# Patient Record
Sex: MALE | Race: WHITE | NOT HISPANIC OR LATINO | Employment: UNEMPLOYED | URBAN - METROPOLITAN AREA
[De-identification: names, ages, dates, MRNs, and addresses within clinical notes are randomized per-mention and may not be internally consistent; named-entity substitution may affect disease eponyms.]

---

## 2019-09-03 ENCOUNTER — OFFICE VISIT CONVERTED (OUTPATIENT)
Dept: CARDIOLOGY | Facility: CLINIC | Age: 49
End: 2019-09-03
Attending: SPECIALIST

## 2019-10-29 ENCOUNTER — OFFICE VISIT CONVERTED (OUTPATIENT)
Dept: CARDIOLOGY | Facility: CLINIC | Age: 49
End: 2019-10-29
Attending: SPECIALIST

## 2019-11-21 ENCOUNTER — HOSPITAL ENCOUNTER (OUTPATIENT)
Dept: LAB | Facility: HOSPITAL | Age: 49
Discharge: HOME | End: 2019-11-21
Attending: INTERNAL MEDICINE

## 2019-11-21 LAB
ALBUMIN SERPL-MCNC: 4.2 G/DL (ref 3.5–5)
ANION GAP SERPL CALC-SCNC: 22 MMOL/L (ref 8–19)
APPEARANCE UR: CLEAR
BASOPHILS # BLD AUTO: 0.11 10*3/UL (ref 0–0.2)
BASOPHILS NFR BLD AUTO: 1.2 % (ref 0–3)
BILIRUB UR QL: NEGATIVE
BUN SERPL-MCNC: 51 MG/DL (ref 5–25)
BUN/CREAT SERPL: 19 {RATIO} (ref 6–20)
CALCIUM SERPL-MCNC: 9.5 MG/DL (ref 8.7–10.4)
CHLORIDE SERPL-SCNC: 99 MMOL/L (ref 99–111)
COLOR UR: YELLOW
CONV ABS IMM GRAN: 0.04 10*3/UL (ref 0–0.2)
CONV BACTERIA: NEGATIVE
CONV CO2: 18 MMOL/L (ref 22–32)
CONV COLLECTION SOURCE (UA): ABNORMAL
CONV CREATININE URINE, RANDOM: 67.6 MG/DL (ref 10–300)
CONV HYALINE CASTS IN URINE MICRO: ABNORMAL /[LPF]
CONV IMMATURE GRAN: 0.4 % (ref 0–1.8)
CONV UROBILINOGEN IN URINE BY AUTOMATED TEST STRIP: 0.2 {EHRLICHU}/DL (ref 0.1–1)
CREAT UR-MCNC: 2.74 MG/DL (ref 0.7–1.2)
DEPRECATED RDW RBC AUTO: 45 FL (ref 35.1–43.9)
EOSINOPHIL # BLD AUTO: 0.2 10*3/UL (ref 0–0.7)
EOSINOPHIL # BLD AUTO: 2.2 % (ref 0–7)
ERYTHROCYTE [DISTWIDTH] IN BLOOD BY AUTOMATED COUNT: 13.2 % (ref 11.6–14.4)
GFR SERPLBLD BASED ON 1.73 SQ M-ARVRAT: 26 ML/MIN/{1.73_M2}
GLUCOSE SERPL-MCNC: 195 MG/DL (ref 70–99)
GLUCOSE UR QL: 250 MG/DL
HCT VFR BLD AUTO: 31.4 % (ref 42–52)
HGB BLD-MCNC: 10.1 G/DL (ref 14–18)
HGB UR QL STRIP: NEGATIVE
KETONES UR QL STRIP: NEGATIVE MG/DL
LEUKOCYTE ESTERASE UR QL STRIP: NEGATIVE
LYMPHOCYTES # BLD AUTO: 1.23 10*3/UL (ref 1–5)
LYMPHOCYTES NFR BLD AUTO: 13.6 % (ref 20–45)
MCH RBC QN AUTO: 29.6 PG (ref 27–31)
MCHC RBC AUTO-ENTMCNC: 32.2 G/DL (ref 33–37)
MCV RBC AUTO: 92.1 FL (ref 80–96)
MONOCYTES # BLD AUTO: 0.81 10*3/UL (ref 0.2–1.2)
MONOCYTES NFR BLD AUTO: 8.9 % (ref 3–10)
NEUTROPHILS # BLD AUTO: 6.68 10*3/UL (ref 2–8)
NEUTROPHILS NFR BLD AUTO: 73.7 % (ref 30–85)
NITRITE UR QL STRIP: NEGATIVE
NRBC CBCN: 0 % (ref 0–0.7)
PH UR STRIP.AUTO: 5 [PH] (ref 5–8)
PHOSPHATE SERPL-MCNC: 4.1 MG/DL (ref 2.4–4.5)
PLATELET # BLD AUTO: 357 10*3/UL (ref 130–400)
PMV BLD AUTO: 10.5 FL (ref 9.4–12.4)
POTASSIUM SERPL-SCNC: 5 MMOL/L (ref 3.5–5.3)
PROT UR QL: 300 MG/DL
PROT UR-MCNC: 136 MG/DL
RBC # BLD AUTO: 3.41 10*6/UL (ref 4.7–6.1)
RBC #/AREA URNS HPF: ABNORMAL /[HPF]
SODIUM SERPL-SCNC: 134 MMOL/L (ref 135–147)
SP GR UR: 1.01 (ref 1–1.03)
WBC # BLD AUTO: 9.07 10*3/UL (ref 4.8–10.8)
WBC #/AREA URNS HPF: ABNORMAL /[HPF]

## 2020-03-02 ENCOUNTER — OFFICE VISIT CONVERTED (OUTPATIENT)
Dept: CARDIOLOGY | Facility: CLINIC | Age: 50
End: 2020-03-02
Attending: SPECIALIST

## 2020-04-27 ENCOUNTER — HOSPITAL ENCOUNTER (OUTPATIENT)
Dept: LAB | Facility: HOSPITAL | Age: 50
Discharge: HOME | End: 2020-04-27
Attending: INTERNAL MEDICINE

## 2020-04-27 LAB
ANION GAP SERPL CALC-SCNC: 23 MMOL/L (ref 8–19)
BUN SERPL-MCNC: 65 MG/DL (ref 5–25)
BUN/CREAT SERPL: 16 {RATIO} (ref 6–20)
CALCIUM SERPL-MCNC: 9.5 MG/DL (ref 8.7–10.4)
CHLORIDE SERPL-SCNC: 99 MMOL/L (ref 99–111)
CONV CO2: 19 MMOL/L (ref 22–32)
CREAT UR-MCNC: 4.14 MG/DL (ref 0.7–1.2)
GFR SERPLBLD BASED ON 1.73 SQ M-ARVRAT: 16 ML/MIN/{1.73_M2}
GLUCOSE SERPL-MCNC: 146 MG/DL (ref 70–99)
OSMOLALITY SERPL CALC.SUM OF ELEC: 303 MOSM/KG (ref 273–304)
POTASSIUM SERPL-SCNC: 5.3 MMOL/L (ref 3.5–5.3)
SODIUM SERPL-SCNC: 136 MMOL/L (ref 135–147)

## 2020-06-15 ENCOUNTER — HOSPITAL ENCOUNTER (OUTPATIENT)
Dept: LAB | Facility: HOSPITAL | Age: 50
Discharge: HOME | End: 2020-06-15
Attending: INTERNAL MEDICINE

## 2020-06-15 LAB
25(OH)D3 SERPL-MCNC: 36.3 NG/ML (ref 30–100)
ALBUMIN SERPL-MCNC: 4.5 G/DL (ref 3.5–5)
ANION GAP SERPL CALC-SCNC: 21 MMOL/L (ref 8–19)
APPEARANCE UR: CLEAR
BASOPHILS # BLD AUTO: 0.08 10*3/UL (ref 0–0.2)
BASOPHILS NFR BLD AUTO: 0.9 % (ref 0–3)
BILIRUB UR QL: NEGATIVE
BUN SERPL-MCNC: 68 MG/DL (ref 5–25)
BUN/CREAT SERPL: 18 {RATIO} (ref 6–20)
CALCIUM SERPL-MCNC: 10.1 MG/DL (ref 8.7–10.4)
CHLORIDE SERPL-SCNC: 101 MMOL/L (ref 99–111)
COLOR UR: YELLOW
CONV ABS IMM GRAN: 0.03 10*3/UL (ref 0–0.2)
CONV BACTERIA: NEGATIVE
CONV CO2: 18 MMOL/L (ref 22–32)
CONV COLLECTION SOURCE (UA): ABNORMAL
CONV CREATININE URINE, RANDOM: 79.3 MG/DL (ref 10–300)
CONV HYALINE CASTS IN URINE MICRO: ABNORMAL /[LPF]
CONV IMMATURE GRAN: 0.3 % (ref 0–1.8)
CONV PROTEIN TO CREATININE RATIO (RANDOM URINE): 0.67 {RATIO} (ref 0–0.1)
CONV UROBILINOGEN IN URINE BY AUTOMATED TEST STRIP: 0.2 {EHRLICHU}/DL (ref 0.1–1)
CREAT UR-MCNC: 3.8 MG/DL (ref 0.7–1.2)
DEPRECATED RDW RBC AUTO: 43.5 FL (ref 35.1–43.9)
EOSINOPHIL # BLD AUTO: 0.17 10*3/UL (ref 0–0.7)
EOSINOPHIL # BLD AUTO: 1.9 % (ref 0–7)
ERYTHROCYTE [DISTWIDTH] IN BLOOD BY AUTOMATED COUNT: 12.5 % (ref 11.6–14.4)
FERRITIN SERPL-MCNC: 209 NG/ML (ref 30–300)
GFR SERPLBLD BASED ON 1.73 SQ M-ARVRAT: 17 ML/MIN/{1.73_M2}
GLUCOSE SERPL-MCNC: 153 MG/DL (ref 70–99)
GLUCOSE UR QL: NEGATIVE MG/DL
HCT VFR BLD AUTO: 30 % (ref 42–52)
HGB BLD-MCNC: 9.2 G/DL (ref 14–18)
HGB UR QL STRIP: NEGATIVE
IRON SATN MFR SERPL: 34 % (ref 20–55)
IRON SERPL-MCNC: 100 UG/DL (ref 70–180)
KETONES UR QL STRIP: NEGATIVE MG/DL
LEUKOCYTE ESTERASE UR QL STRIP: NEGATIVE
LYMPHOCYTES # BLD AUTO: 2.1 10*3/UL (ref 1–5)
LYMPHOCYTES NFR BLD AUTO: 24 % (ref 20–45)
MCH RBC QN AUTO: 29.2 PG (ref 27–31)
MCHC RBC AUTO-ENTMCNC: 30.7 G/DL (ref 33–37)
MCV RBC AUTO: 95.2 FL (ref 80–96)
MONOCYTES # BLD AUTO: 0.8 10*3/UL (ref 0.2–1.2)
MONOCYTES NFR BLD AUTO: 9.1 % (ref 3–10)
NEUTROPHILS # BLD AUTO: 5.57 10*3/UL (ref 2–8)
NEUTROPHILS NFR BLD AUTO: 63.8 % (ref 30–85)
NITRITE UR QL STRIP: NEGATIVE
NRBC CBCN: 0 % (ref 0–0.7)
PH UR STRIP.AUTO: 5 [PH] (ref 5–8)
PHOSPHATE SERPL-MCNC: 4.6 MG/DL (ref 2.4–4.5)
PLATELET # BLD AUTO: 368 10*3/UL (ref 130–400)
PMV BLD AUTO: 10.6 FL (ref 9.4–12.4)
POTASSIUM SERPL-SCNC: 5.5 MMOL/L (ref 3.5–5.3)
PROT UR QL: 100 MG/DL
PROT UR-MCNC: 53.4 MG/DL
PTH-INTACT SERPL-MCNC: 67.1 PG/ML (ref 11.1–79.5)
RBC # BLD AUTO: 3.15 10*6/UL (ref 4.7–6.1)
RBC #/AREA URNS HPF: ABNORMAL /[HPF]
SODIUM SERPL-SCNC: 134 MMOL/L (ref 135–147)
SP GR UR: 1.01 (ref 1–1.03)
SPERM URNS QL MICRO: PRESENT
TIBC SERPL-MCNC: 290 UG/DL (ref 245–450)
TRANSFERRIN SERPL-MCNC: 203 MG/DL (ref 215–365)
WBC # BLD AUTO: 8.75 10*3/UL (ref 4.8–10.8)
WBC #/AREA URNS HPF: ABNORMAL /[HPF]

## 2020-08-20 ENCOUNTER — HOSPITAL ENCOUNTER (OUTPATIENT)
Dept: INFUSION THERAPY | Facility: HOSPITAL | Age: 50
Setting detail: RECURRING SERIES
Discharge: HOME | End: 2020-11-18
Attending: INTERNAL MEDICINE

## 2020-08-20 LAB
HCT VFR BLD AUTO: 27 % (ref 42–52)
HGB BLD-MCNC: 8.6 G/DL (ref 14–18)

## 2020-09-17 ENCOUNTER — HOSPITAL ENCOUNTER (OUTPATIENT)
Dept: LAB | Facility: HOSPITAL | Age: 50
Discharge: HOME | End: 2020-09-17
Attending: NURSE PRACTITIONER

## 2020-09-17 LAB
ALBUMIN SERPL-MCNC: 4.1 G/DL (ref 3.5–5)
ANION GAP SERPL CALC-SCNC: 21 MMOL/L (ref 8–19)
BUN SERPL-MCNC: 50 MG/DL (ref 5–25)
BUN/CREAT SERPL: 12 {RATIO} (ref 6–20)
CALCIUM SERPL-MCNC: 9.5 MG/DL (ref 8.7–10.4)
CHLORIDE SERPL-SCNC: 93 MMOL/L (ref 99–111)
CONV CO2: 23 MMOL/L (ref 22–32)
CREAT UR-MCNC: 4.03 MG/DL (ref 0.7–1.2)
GFR SERPLBLD BASED ON 1.73 SQ M-ARVRAT: 16 ML/MIN/{1.73_M2}
GLUCOSE SERPL-MCNC: 165 MG/DL (ref 70–99)
PHOSPHATE SERPL-MCNC: 3.6 MG/DL (ref 2.4–4.5)
POTASSIUM SERPL-SCNC: 4.3 MMOL/L (ref 3.5–5.3)
SODIUM SERPL-SCNC: 133 MMOL/L (ref 135–147)

## 2021-03-26 ENCOUNTER — CONVERSION ENCOUNTER (OUTPATIENT)
Dept: INTERNAL MEDICINE | Facility: CLINIC | Age: 51
End: 2021-03-26

## 2021-03-26 ENCOUNTER — HOSPITAL ENCOUNTER (OUTPATIENT)
Dept: INTERNAL MEDICINE | Facility: CLINIC | Age: 51
Discharge: HOME | End: 2021-03-26
Attending: STUDENT IN AN ORGANIZED HEALTH CARE EDUCATION/TRAINING PROGRAM

## 2021-03-26 ENCOUNTER — OFFICE VISIT CONVERTED (OUTPATIENT)
Dept: INTERNAL MEDICINE | Facility: CLINIC | Age: 51
End: 2021-03-26
Attending: STUDENT IN AN ORGANIZED HEALTH CARE EDUCATION/TRAINING PROGRAM

## 2021-03-26 LAB
ALBUMIN SERPL-MCNC: 3.8 G/DL (ref 3.5–5)
ALBUMIN/GLOB SERPL: 1.1 {RATIO} (ref 1.4–2.6)
ALP SERPL-CCNC: 96 U/L (ref 53–128)
ALT SERPL-CCNC: 9 U/L (ref 10–40)
ANION GAP SERPL CALC-SCNC: 18 MMOL/L (ref 8–19)
AST SERPL-CCNC: 11 U/L (ref 15–50)
BASOPHILS # BLD AUTO: 0.09 10*3/UL (ref 0–0.2)
BASOPHILS NFR BLD AUTO: 0.9 % (ref 0–3)
BILIRUB SERPL-MCNC: <0.15 MG/DL (ref 0.2–1.3)
BUN SERPL-MCNC: 48 MG/DL (ref 5–25)
BUN/CREAT SERPL: 18 {RATIO} (ref 6–20)
CALCIUM SERPL-MCNC: 9 MG/DL (ref 8.7–10.4)
CHLORIDE SERPL-SCNC: 108 MMOL/L (ref 99–111)
CHOLEST SERPL-MCNC: 148 MG/DL (ref 107–200)
CHOLEST/HDLC SERPL: 4.5 {RATIO} (ref 3–6)
CONV ABS IMM GRAN: 0.03 10*3/UL (ref 0–0.2)
CONV CO2: 16 MMOL/L (ref 22–32)
CONV IMMATURE GRAN: 0.3 % (ref 0–1.8)
CONV TOTAL PROTEIN: 7.4 G/DL (ref 6.3–8.2)
CREAT UR-MCNC: 2.62 MG/DL (ref 0.7–1.2)
DEPRECATED RDW RBC AUTO: 40.9 FL (ref 35.1–43.9)
EOSINOPHIL # BLD AUTO: 0.29 10*3/UL (ref 0–0.7)
EOSINOPHIL # BLD AUTO: 3 % (ref 0–7)
ERYTHROCYTE [DISTWIDTH] IN BLOOD BY AUTOMATED COUNT: 12.3 % (ref 11.6–14.4)
EST. AVERAGE GLUCOSE BLD GHB EST-MCNC: 103 MG/DL
GFR SERPLBLD BASED ON 1.73 SQ M-ARVRAT: 27 ML/MIN/{1.73_M2}
GLOBULIN UR ELPH-MCNC: 3.6 G/DL (ref 2–3.5)
GLUCOSE SERPL-MCNC: 110 MG/DL (ref 70–99)
HBA1C MFR BLD: 5.2 % (ref 3.5–5.7)
HCT VFR BLD AUTO: 27.2 % (ref 42–52)
HDLC SERPL-MCNC: 33 MG/DL (ref 40–60)
HGB BLD-MCNC: 8.6 G/DL (ref 14–18)
LDLC SERPL CALC-MCNC: 91 MG/DL (ref 70–100)
LYMPHOCYTES # BLD AUTO: 1.83 10*3/UL (ref 1–5)
LYMPHOCYTES NFR BLD AUTO: 18.6 % (ref 20–45)
MCH RBC QN AUTO: 28.9 PG (ref 27–31)
MCHC RBC AUTO-ENTMCNC: 31.6 G/DL (ref 33–37)
MCV RBC AUTO: 91.3 FL (ref 80–96)
MONOCYTES # BLD AUTO: 0.92 10*3/UL (ref 0.2–1.2)
MONOCYTES NFR BLD AUTO: 9.4 % (ref 3–10)
NEUTROPHILS # BLD AUTO: 6.66 10*3/UL (ref 2–8)
NEUTROPHILS NFR BLD AUTO: 67.8 % (ref 30–85)
NRBC CBCN: 0 % (ref 0–0.7)
OSMOLALITY SERPL CALC.SUM OF ELEC: 295 MOSM/KG (ref 273–304)
PLATELET # BLD AUTO: 449 10*3/UL (ref 130–400)
PMV BLD AUTO: 9.8 FL (ref 9.4–12.4)
POTASSIUM SERPL-SCNC: 5.6 MMOL/L (ref 3.5–5.3)
RBC # BLD AUTO: 2.98 10*6/UL (ref 4.7–6.1)
SODIUM SERPL-SCNC: 136 MMOL/L (ref 135–147)
TRIGL SERPL-MCNC: 122 MG/DL (ref 40–150)
TSH SERPL-ACNC: 2.34 M[IU]/L (ref 0.27–4.2)
VLDLC SERPL-MCNC: 24 MG/DL (ref 5–37)
WBC # BLD AUTO: 9.82 10*3/UL (ref 4.8–10.8)

## 2021-04-15 ENCOUNTER — OFFICE VISIT CONVERTED (OUTPATIENT)
Dept: INTERNAL MEDICINE | Facility: CLINIC | Age: 51
End: 2021-04-15
Attending: STUDENT IN AN ORGANIZED HEALTH CARE EDUCATION/TRAINING PROGRAM

## 2021-04-30 ENCOUNTER — PATIENT OUTREACH - CONVERTED (OUTPATIENT)
Dept: INTERNAL MEDICINE | Facility: CLINIC | Age: 51
End: 2021-04-30
Attending: STUDENT IN AN ORGANIZED HEALTH CARE EDUCATION/TRAINING PROGRAM

## 2021-05-05 ENCOUNTER — HOSPITAL ENCOUNTER (OUTPATIENT)
Dept: NUCLEAR MEDICINE | Facility: HOSPITAL | Age: 51
Discharge: HOME | End: 2021-05-05
Attending: SPECIALIST

## 2021-05-10 NOTE — H&P
History and Physical      Patient Name: Rogelio Dee   Patient ID: [id number]   Sex: Male   YOB: 1970    Primary Care Provider: Simeon Calhoun MD   Referring Provider: Adelita Islas NP    Visit Date: March 26, 2021    Provider: Simeon Calhoun MD   Location: [facility]   Location Address: [address] [place] [address]; [address]  [place], [place]  [address]   Location Phone: [phone]          Chief Complaint  · new patient, establish care  · medication refills      History Of Present Illness  Rogelio Dee is a 51 year old /White male who presents for evaluation and treatment of:      here to establish care.  previous pcp - Adelita Islas    Complex past medical history notable for CKD 4 secondary diabetic nephropathy, metabolic acidosis 2/2 CKD, T2DM with neuropathy, diabetic retinopathy with left eye blindness, great toe amputations bilaterally, nicotine abuse, diastolic CHF/HFpEF and low back pain.    He reports he has fired his previous PCP, Adelita Islas as she has disclosed private medical information regarding him to the patient's mother.    He says that he has refused dialysis treatment, but then goes on to say he might consider peritoneal dialysis.    He follows with pain medicine for his chronic pain ([facility]).    His nephrologist is Dr. Marietta Hutson.  His cardiologist is Christopher.  He was somewhat evasive when asked why he hasn't followed up with either person in the last several months.    He follows with podiatry, who per his report has him on Cipro for a diabetic left foot ulcer that was recently worked on.    He follows with ophtho at [facility], where he reportedly receives intravitreal injections.    He is a 1PPD smoker, and also vapes nicotine-containing fluids.  He denies other illicits, MJ or ETOH.  He does report that he often takes ibuprofen and Tylenol for additional pain management on top of what he is  prescribed.  He denies BC powder.    In 2020, had evaluation for chest pain including stress test showing normal EF, and no evidence of ischemia.    He reports that he has run out of many of his meds, and is using old prescriptions to get by.  Of note, he says that he will sometimes take metoprolol on top of his carvedilol when he experiences chest pain.    He has a history of hyperkalemia for which ace inhibitor and spironolactone were previously discontinued.    Food insecurity/Housing Instability:    Reports reliant on food genao for food.  His house is being foreclosed.  He has been attempting to obtain disability, but thus far has been unsuccessful.    Health Maintenance:  Immunizations: refuses influenza, COVID and other immunizations  Colon cancer screening: not interested at this time           Past Medical History  Disease Name Date Onset Notes   Chronic pain syndrome --  --    Diabetes Mellitus, Type II --  --    Hyperlipidemia --  --    Hypertension, Benign Essential --  --    Lumbosacral disc herniation, L5-S1 --  --          Past Surgical History  Procedure Name Date Notes   Discectomy --  --    Toe amputation --  --          Medication List  Name Date Started Instructions   amlodipine 10 mg oral tablet 05/26/2020 TAKE 1 TABLET BY MOUTH EVERY DAY   aspirin 81 mg oral tablet,delayed release (DR/EC)  take 1 tablet (81 mg) by oral route once daily   atorvastatin 20 mg oral tablet  take 1 tablet (20 mg) by oral route once daily at bedtime   carvedilol 25 mg oral tablet  take 1 tablet (25 mg) by oral route 2 times per day with food   cholecalciferol (vitamin D3) 50 mcg (2,000 unit) oral capsule  take 1 capsule by oral route daily   citalopram 40 mg oral tablet  --    clonidine HCl 0.1 mg oral tablet  take 1 tablet (0.1 mg) by oral route 2 times per day   furosemide 40 mg oral tablet  take 1 tablet (40 mg) by oral route once daily   HYDRALAZINE 100MG (HUNDRED MG) TABS 11/23/2020 TAKE 1 TABLET BY MOUTH THREE  TIMES DAILY   hydrocodone-acetaminophen 7.5-325 mg oral tablet  take 1 tablet by oral route every 6 hours as needed for pain   Januvia 50 mg oral tablet  take 2 tablets (100 mg) by oral route once daily   lisinopril 20 mg oral tablet  take 1 tablet (20 mg) by oral route once daily   lisinopril-hydrochlorothiazide 20-12.5 mg oral tablet  take 1 tablet by oral route once daily   Lokelma 5 gram oral powder in packet  take 1 packet (5 gram) mix in 45 mL water and drink immediately by oral route every other day rinse glass with water and drink for full dose   ropinirole 0.5 mg oral tablet  take 1 tablet (0.5 mg) by oral route 1-3 hours before bedtime   sodium bicarbonate 650 mg oral tablet  take 2 tablets by oral route 2 times a day   spironolactone 25 mg oral tablet  take 1 tablet (25 mg) by oral route 2 times per day   tamsulosin 0.4 mg oral capsule  take 1 capsule (0.4 mg) by oral route once daily 1/2 hour following the same meal each day   Tresiba FlexTouch U-100 100 unit/mL (3 mL) subcutaneous insulin pen  inject by subcutaneous route as per insulin protocol         Allergy List  Allergen Name Date Reaction Notes   NO KNOWN DRUG ALLERGIES --  --  --          Family Medical History  Disease Name Relative/Age Notes   Stroke  --    Cancer, Unspecified  --          Social History  Finding Status Start/Stop Quantity Notes   Tobacco Current every day --/-- --  --          Review of Systems  · Constitutional  o Denies  o : fever, headache, chills  · Eyes  o Denies  o : eye pain, double vision, blurred vision  · HENT  o Denies  o : sinus problems, sore throat, ear infection  · Cardiovascular  o Denies  o : chest pain, high blood pressure, varicosities  · Respiratory  o Denies  o : shortness of breath, wheezing, frequent cough  · Gastrointestinal  o Denies  o : nausea, vomiting, heartburn, indigestion, abdominal pain  · Genitourinary  o Denies  o : urgency, frequency, urinary retention, painful  "urination  · Integument  o Denies  o : rash, itching, boils  · Neurologic  o Denies  o : tingling or numbness, tremors, dizzy spells  · Musculoskeletal  o Denies  o : joint pain, neck pain, back pain  · Endocrine  o Denies  o : cold intolerance, heat intolerance, tired, excessive thirst, sluggish  · Psychiatric  o Admits  o : feels satisfied with life  o Denies  o : severe depression, concerns with hurting themselves  · Heme-Lymph  o Denies  o : swollen glands, blood clotting problems  · Allergic-Immunologic  o Denies  o : sinus allergy symptoms, hay fever      Vitals  Date Time BP Position Site L\R Cuff Size HR RR TEMP (F) WT  HT  BMI kg/m2 BSA m2 O2 Sat FR L/min FiO2 HC       03/26/2021 05:00 /93 Sitting    102 - R 14 98.3 165lbs 0oz 5'  8\" 25.09 1.89 97 %  21%          Physical Examination  · Constitutional  o Appearance  o : no acute distress, well-nourished  · Head and Face  o Head  o :   § Inspection  § : atraumatic, normocephalic  · Eyes  o Eyes  o : extraocular movements intact, no scleral icterus, no conjunctival injection  · Ears, Nose, Mouth and Throat  o Ears  o :   § External Ears  § : normal  o Nose  o :   § Intranasal Exam  § : nares patent  o Oral Cavity  o :   § Oral Mucosa  § : moist mucous membranes  · Respiratory  o Respiratory Effort  o : breathing comfortably, symmetric chest rise  o Auscultation of Lungs  o : clear to asculatation bilaterally, no wheezes, rales, or rhonchii  · Cardiovascular  o Heart  o :   § Auscultation of Heart  § : regular rate and rhythm, no murmurs, rubs, or gallops  o Peripheral Vascular System  o :   § Extremities  § : 1+ LE edema. Left foot with dressing in place (clean, dry and intact)  · Gastrointestinal  o Abdominal Examination  o :   § Abdomen  § : non-distended, non-tender  · Lymphatic  o Neck  o : JVD noted  · Neurologic  o Mental Status Examination  o :   § Orientation  § : grossly oriented to person, place and time  o Gait and Station  o :   § Gait " Screening  § : normal gait  · Psychiatric  o General  o : normal mood and affect          Assessment  · Annual physical exam     V70.0/Z00.00  · Essential hypertension     401.9/I10  -uncontrolled  -will obtain labs, has history of hyperkalemia with advanced CKD, medication non-adherence and dietary non-adherence due to socio-economic circumstances (unemployed and reliant on [employer] for food)  -have informed that he will need to take medicines as prescribed  · Nicotine dependence     305.1/F17.200  -strongly counseled on the importance of tobacco and vaping cessation  -patient uninterested in smoking cessation at this time  · Type 2 diabetes mellitus     250.00/E11.9  · Low back pain     724.2/M54.5  -follows with pain management  · CKD (chronic kidney disease) stage 4, GFR 15-29 ml/min     585.4/N18.4  -would consider peritoneal dialysis  -will refer back to nephrology, and try to obtain nephron records  · Type 2 diabetes, uncontrolled, with neuropathy       Type 2 diabetes mellitus with diabetic neuropathy, unspecified     250.62/E11.40  Type 2 diabetes mellitus with hyperglycemia     250.62/E11.65  -will refer to Ira Larson (endocrinology)  · Amputation of great toe     895.0/S98.119A  -follows with podiatry  -started on cipro by podiatry  · Diabetic foot ulcer       Type 2 diabetes mellitus with foot ulcer     250.80/E11.621  Non-pressure chronic ulcer of other part of unspecified foot with unspecified severity     250.80/L97.509  · Blind left eye     369.60/H54.40  -follows with ophthalmology, receives intravitreal injections per patient's report  · Metabolic acidosis     276.2/E87.2  -noted on September 2020 bloodwork, likely due to advanced CKD  -will continue sodium bicarb  · Diabetic retinopathy       Type 2 diabetes mellitus with unspecified diabetic retinopathy without macular edema     250.50/E11.319  -follows with optho, reportedly receives intravitreal injections  -will obtain ophtho  records  · Diastolic congestive heart failure       Unspecified diastolic (congestive) heart failure     428.30/I50.30  -appears euvolemic and stable  -9/2020 stress test unremarkable and reassuring  -will refer to cardio, and attempt to obtain records  · Anemia due to chronic kidney disease       Chronic kidney disease, unspecified     285.21/N18.9  Anemia in chronic kidney disease     285.21/D63.1  -iron studies in September inconsistent with iron deficiency  -will refer to nephro  · Housing problems     V60.9/Z59.9  -will discuss with patient navigator and social work  · Food insecurity     V60.2/Z59.4  -will discuss with patient navigator and social work      Plan  · Orders  o ACO-17: Screened for tobacco use AND received tobacco cessation intervention (4004F) - 305.1/F17.200 - 03/26/2021  o Hgb A1c [facility] ([id number]) - 250.00/E11.9, 250.62/E11.40, 250.62/E11.65, 250.80/E11.621 - 03/26/2021  o Physical, Primary Care Panel (CBC, CMP, Lipid, TSH) [facility] ([id number], 42013, 42161, 84631) - V70.0/Z00.00, 585.4/N18.4, 250.62/E11.40, 250.62/E11.65 - 03/26/2021  o ACO-14: Influenza immunization was not administered for reasons documented [facility] () - 250.00/E11.9, 724.2/M54.5 - 03/26/2021   patient refused  o ACO-39: Current medications updated and reviewed (, 1159F) - 250.00/E11.9, V70.0/Z00.00, 724.2/M54.5, 585.4/N18.4 - 03/26/2021  o  Consultation (SSCON) - V60.9/Z59.9, V60.2/Z59.4 - 03/26/2021   unable to afford food, CKD4 plus T2DM requiring strict low potassium diet. Facing eviction.  o ENDOCRINOLOGY CONSULTATION (ENDOC) - 250.00/E11.9 - 03/26/2021   Ira Larson, Endocrine Nurse  o NEPHROLOGY CONSULTATION (NEPHR) - 585.4/N18.4, 401.9/I10, 276.2/E87.2, 285.21/D63.1 - 03/26/2021   Marietta Hutson nephrology  o CARDIOLOGY CONSULTATION (CARDI) - 428.30/I50.30 - 03/26/2021   [facility] cardiology  · Medications  o Medications have been Reconciled  o Transition of Care or Provider Policy  · Instructions  o Reviewed  health maintenance flowsheet and updated information. Orders were placed and/or patient's response was documented.  o *Form of nicotine being used:   o Patient was strongly encouraged to discontinue use of any nicotine containing product or minimize the use of the product.  o Patient was educated/instructed on their diagnosis, treatment and medications prior to discharge from the clinic today.  o Patient counseled to stop smoking.  o Flu vaccine declined.  · Disposition  o Return Visit Request in/on 1 month +/- 2 days ([id number]).  · Associate Tasks  o Task ID [id number] ''Front to Provider:             Electronically Signed by: Simeon Calhoun MD -Author on March 26, 2021 11:16:34 PM

## 2021-05-11 NOTE — OUTREACH NOTE
Quick Note      Patient Name: Rogelio Dee   Patient ID: [id number]   Sex: Male   YOB: 1970    Primary Care Provider: Adelita Islas NP   Referring Provider: dAelita Islas NP    Visit Date: April 30, 2021    Provider: Simeon Calhoun MD   Location: [facility] [place]   Location Address: [address] [place] [address]; [address] [place] [address]  [address]   Location Phone: [phone]          History Of Present Illness  CCM Comprehensive Care Plan    This Chronic Medical Management Care Plan for Rogelio Dee, 51 year old /White male, has been monitored and managed, reviewed, revised, and established for the month of April. A cumulative time of 44 minutes was spent on this patient record, including phone call with patient, phone call with primary care provider, chart review, and electronic communications with .   Regarding the patient's diagnoses Blindness of left eye, CHF (congestive heart failure), Chronic kidney disease, Diabetes Mellitus, Type II, Diabetic neuropathy, Great toe amputation status, left, Great toe amputation status, right, Low back pain, Lumbosacral disc herniation, L5-S1, Metabolic Acidosis, and Nicotine abuse, the following items were adressed: medical records, medications, changes to medical care, transitions to medical care, and referrals to community service providers and any changes can be found within the plan section of the note. A detailed listing of time spent for chronic care management has been scanned into the patient's electronic record. Current medications include: amlodipine 10 mg oral tablet, aspirin 81 mg oral tablet,delayed release (DR/EC), atorvastatin 20 mg oral tablet, carvedilol 25 mg oral tablet, cholecalciferol (vitamin D3) 50 mcg (2,000 unit) oral capsule, citalopram 40 mg oral tablet, clonidine HCl 0.1 mg oral tablet, ferrous sulfate 325 mg (65 mg iron) oral tablet, Flagyl 500 mg oral tablet, furosemide  40 mg oral tablet, hydralazine 100 mg oral tablet, hydrocodone-acetaminophen 7.5-325 mg oral tablet, insulin lispro 100 unit/mL subcutaneous insulin pen, Januvia 25 mg oral tablet, levofloxacin 500 mg oral tablet, lisinopril 20 mg oral tablet, lisinopril-hydrochlorothiazide 20-12.5 mg oral tablet, Lokelma 5 gram oral powder in packet, ropinirole 0.25 mg oral tablet, sodium bicarbonate 650 mg oral tablet, spironolactone 25 mg oral tablet, tamsulosin 0.4 mg oral capsule, and Tresiba FlexTouch U-100 100 unit/mL (3 mL) subcutaneous insulin pen and the patient is reported to be compliant with medication protocol. Medications are reported to be effective. Regarding these diagnoses, referrals were made to the following provider/s Ira CANTU.   The patient was monitored remotely for blood glucose for a period of 44 minutes.   This patient's physical needs include: eye care, needs assistance with ADLs, physical healthcare, physician referral, and DME supplies. Patient will require follow up with KENNA CANTU for help with Diabetes. continued help with healthcare, ADL's, DME's as required for continued mobility.   Patient's mental support needs include: continued support. Patient will require ongoing support as his disease process continues.   The patient's cognitive support needs are currently being met.   The patient's psychosocial support needs are N/A,   This patient's functional needs include: DME supplies, eye care, health care coverage, physical healthcare, physician referral, and resources for disability needs. patient will require DME as charted , may require supplemental housing, as well as disability needs..   This patient's environmental needs include: resources for disability needs, no access to transportation, and May be evicted. working with patient to assist with possible housing needs. Information mailed to patient.           Assessment  · Chronic Care Management (CCM)     V68.89/Z02.89  · Diabetes  Mellitus, Type II     250.00/E11.9  · CHF (congestive heart failure)     428.0/I50.9  · Great toe amputee     V49.71/Z89.419  · Nicotine abuse     305.1/Z72.0  · Blindness of left eye     369.60/H54.40  · Chronic kidney disease due to diabetes mellitus       Type 2 diabetes mellitus with diabetic chronic kidney disease     250.40/E11.22  · Metabolic acidosis     276.2/E87.2      Plan  · Medications  o Medications have been Reconciled  o Transition of Care or Provider Policy  · Instructions  o Patient's Health Care Goals: Obtain adequate food , housing, and medication   o Provider's Health Care Goals: Address current needs , reduce blood sugar, follow up with CCM   o Patient was provided an electronic copy of care plan  o CCM services were explained and offered and patient has accepted these services.  o Patient has given their written consent to recieve CCM services and understands that this includes the authorization of electronic communication of medical information with other treating providers.  o Patient understands that they may stop CCM services at any time and these changes will be effective at the end of the calendar month and will effectively revocate the agreement of CCM services.  o Patient understands that only one practioner can furnish and be paid for CCM services during one calendar month. Patient also understands that there may be co-payment or deductible fees in association with CCM services.  o Patient will continue with at least monthly follow-up calls with the Nurse Navigator.  · Associate Tasks  o Task ID [id number] CCM: CCM Note April 2021            Electronically Signed by: Chris Davis MA -Author on April 30, 2021 12:08:37 PM

## 2021-05-11 NOTE — OUTREACH NOTE
Quick Note      Patient Name: Rogelio Dee   Patient ID: [id number]   Sex: Male   YOB: 1970    Primary Care Provider: Adelita Islas NP   Referring Provider: Adelita Islas NP    Visit Date: April 30, 2021    Provider: Simeon Calhoun MD   Location: [facility] [place]   Location Address: [address] [place] [address]; [address] [place] [address]  [address]   Location Phone: [phone]          History Of Present Illness     CCM     TaskID: [id number]    Task Subject: CCM Note April 2021    Task Comments:    Date: Apr 14 2021  1:58PM     Creator: selam  I called the diabeties center and the patient is scheduled to see KENNA Larson on April 29th @ 9:30 AM. I called the patient and advised and he verbalized understanding . I also informed him I would be sending out a pack of info for him to review for houseing , food, and he stated he understood. ( total 18 min )    Date: Apr 14 2021 10:06AM     Creator: selam  I sent a very detailed list of the patients issues and needs to [facility] Socail worker and asked for information . Mrs. Shelley returned with a list of posible ideas and places for posible assistance. ( scaned into chart) I will follow up with patient . ( 7 min )    Date: Apr 14 2021 10:04AM     Creator: selam  ( Continued) pick them up and he stated he could. The patients meds were reconciled during call. Patient stated no further needs at this time. ( TCM portion 8 min  CCM portion 18 min )    Date: Apr 14 2021  9:57AM     Creator: selam  ( late entery call [facility] 4-13-21)I was asked by PCP to call patient for TCM as well as posible CCM consent . I called the PATIENT AND CONFIRMED ALL INFO FOR tcm WITH fu SCHEDULED. i TALKED WITH THE PATIENT ABOUT ccm and explained that he was under no obligation to consent and that if he did consent that he could stop service at any time. Afetr answering the patients questions he consented to CCM service. Per patients conversation he stated  that he had just been DC;d from [facility] for SEPSIS, OSTEOMYELITIS, SEVERE ANEMIA. The patient stated he was feeling better . He stated that he was 3-4 months behind on his rent and with no means of transportation at this time he and his room mate only had food for a week or so . I shared that I would reach out to our socail worker and try to find help for him and he agreed. He stated that he was on Januvis 25 mg and was not able to get it because of cost. I asked If I could get a sample for him to use until we can get him into a program to help with meds if he could have somone             Plan  · Medications  o Medications have been Reconciled  o Transition of Care or Provider Policy            Electronically Signed by: Chris Davis MA -Author on April 30, 2021 12:09:19 PM

## 2021-05-14 VITALS
DIASTOLIC BLOOD PRESSURE: 76 MMHG | WEIGHT: 167 LBS | HEART RATE: 82 BPM | HEIGHT: 68 IN | OXYGEN SATURATION: 96 % | SYSTOLIC BLOOD PRESSURE: 143 MMHG | BODY MASS INDEX: 25.31 KG/M2

## 2021-05-14 VITALS
SYSTOLIC BLOOD PRESSURE: 182 MMHG | WEIGHT: 165 LBS | TEMPERATURE: 98.3 F | BODY MASS INDEX: 25.01 KG/M2 | OXYGEN SATURATION: 97 % | HEIGHT: 68 IN | DIASTOLIC BLOOD PRESSURE: 93 MMHG | RESPIRATION RATE: 14 BRPM | HEART RATE: 102 BPM

## 2021-05-14 NOTE — PROGRESS NOTES
Progress Note      Patient Name: Rogelio Dee   Patient ID: [id number]   Sex: Male   YOB: 1970    Primary Care Provider: Simeon Calhoun MD   Referring Provider: Adelita Islas NP    Visit Date: April 15, 2021    Provider: Simeon Calhoun MD   Location: [facility]   Location Address: [address] [place] [address]; [address] [place] [address]  [id number]   Location Phone: [phone]          Chief Complaint  · Follow up office visit within 14 calendar days of discharge from inpatient status (moderate complexity).      History Of Present Illness  FOLLOW UP OFFICE VISIT WITHIN 14 CALENDAR DAYS OF INPATIENT STATUS (MODERATE COMPLEXITY)  Rogelio Dee presents to office for follow up post discharge from inpatient status within 14 calendar days. Patient was contacted within 2 business days via phone conversation. Documentation of that phone contact is present in the patient's electronic chart. Patient was admitted to inpatient facility on 04/05/2021 and discharged 04/12/2021 due to: Pulmonary Edema, Type II NSTEMI.   Admitting MD: Jasper Kothari   His discharge summary has been reviewed and placed in the patient's electronic chart.   Patient's problem list is: CHF (congestive heart failure), Chronic kidney disease, Diabetes Mellitus, Type II, Diabetic neuropathy, Metabolic Acidosis, Nicotine abuse, and normocytic anemia   Patient's outpatient medication list has been reconcilled with the medication list from the discharge summary and has been reviewed with the patient. Current medication list is: amlodipine 10 mg oral tablet, aspirin 81 mg oral tablet,delayed release (DR/EC), atorvastatin 20 mg oral tablet, carvedilol 25 mg oral tablet, cholecalciferol (vitamin D3) 50 mcg (2,000 unit) oral capsule, citalopram 40 mg oral tablet, clonidine HCl 0.1 mg oral tablet, ferrous sulfate 325 mg (65 mg iron) oral tablet, furosemide 40 mg oral tablet, hydralazine 100 mg oral tablet,  hydrocodone-acetaminophen 7.5-325 mg oral tablet, insulin lispro 100 unit/mL subcutaneous insulin pen, levofloxacin 500 mg oral tablet, metronidazole 500 mg oral tablet, ropinirole 0.25 mg oral tablet, sodium bicarbonate 650 mg oral tablet, tamsulosin 0.4 mg oral capsule, and Tresiba FlexTouch U-100 100 unit/mL (3 mL) subcutaneous insulin pen      here with mother for hospital follow up.    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  Blood culture positive for bacteroides fragilis.  Currently CKD IV/V (2/2 to poorly controlled HTN, DM, and previous obstruction).  Normocytic anemia noted with a ashley of hgb 5.9 while inpatient.  No blood products as a [employer], but received IV iron and EPO from nephro service.    No issues since returning home.  Reports is compliant with his medicines.  Will follow up with podiatry, nephro, cardiology and Ira Larson (diabetes NP) outpatient.    He reports he is smoking 10 cigarettes a day, and is uninterested in quitting.  He takes ibuprofen occasionally for LBP.  His SOA is much improved now since returning home.    He is monitoring his BS and BP at home.  BP running systolic 140s to 160s.  BS running 117 to 196.           Review of Systems  · Constitutional  o Denies  o : fever, weight gain, weight loss, fatigue  · Cardiovascular  o Denies  o : palpitation, chest pain, claudication, lower extremity edema  · Respiratory  o Denies  o : shortness of breath, hemoptysis, wheezing, dry cough, productive cough  · Gastrointestinal  o Denies  o : nausea, vomiting, diarrhea, constipation, abdominal pain  · Neurologic  o Denies  o : unsteady gait, weakness, dizziness, H/A      Vitals  Date Time BP Position Site L\R Cuff Size HR RR TEMP (F) WT  HT  BMI kg/m2 BSA m2 O2 Sat FR L/min FiO2 HC       09/03/2019 10:47 AM  "206/116 Sitting    80 - R   174lbs 0oz 5'  8\" 26.46 1.95       03/02/2020 02:44 /92 Sitting    116 - R   161lbs 0oz 5'  8\" 24.48 1.87       03/02/2020 02:44 /94 Sitting                 03/26/2021 05:00 /93 Sitting    102 - R 14 98.3 165lbs 0oz 5'  8\" 25.09 1.89 97 %  21%    04/15/2021 01:25 /76 Sitting    82 - R   166lbs 16oz 5'  8\" 25.39 1.91 96 %  21%          Physical Examination     Gen: NAD, well nourished  HEENT: NCAT  CV: RRR w/ no m/r/g, no JVD  Resp: CTAB, nonlabored breathing  GI: soft, NTTP, no masses or HSM  MSK/Ext: no LE edema             Assessment  · Diabetes Mellitus, Type II     250.00/E11.9  -unable to obtain renally dosed Januvia due to insurance issues  -will refer to Ira Larson for assistance in better glycemic control  · Chronic kidney disease     585.9/N18.9  -follows with nephro  · Blindness of left eye     369.60/H54.42  · Low back pain     724.2/M54.5  -strongly recommended against NSAIDs given renal function  -will prescribe topical voltaren, okay for tylenol  · Essential hypertension     401.9/I10  -per report, above goal of less than 130/80  -given stage of CKD, blood pressure targets will be challenging to achieve  · Normocytic anemia     285.9/D64.9  · Nicotine dependence     305.1/F17.200  -strongly counseled on the importance of smoking cessation  -frankly uninterested in quitting    Problems Reconciled  Plan  · Orders  o Discharge medications reconciled with the current medication list (1111F) - 250.00/E11.9, 585.9/N18.9, 369.60/H54.42, 401.9/I10 - 04/15/2021  · Medications  o Voltaren 1 % topical gel   SIG: apply 2 grams to the affected area(s) by topical route 4 times per day   DISP: (1) Package with 5 refills  Prescribed on 04/15/2021     o Medications have been Reconciled  o Transition of Care or Provider Policy  · Instructions  o Patient discharge summary has been reviewed and placed in patient's electronic medical record.  o Patient received a phone " call from my office within 2 business days of discharge from inpatient status, and documented within the patient's chart.  o Also patient was seen (face to face) for follow up evaluation within 14 calendar days of discharge from inpatient status for a severe complexity issue.  o Patient was educated on their diagnosis, treatment and any medication changes while being evaluated today.  · Disposition  o Return Visit Request in/on 3 months +/- 2 days ([id number]).            Electronically Signed by: Simeon Calhoun MD -Author on April 15, 2021 09:28:29 PM

## 2021-05-15 VITALS
SYSTOLIC BLOOD PRESSURE: 206 MMHG | WEIGHT: 174 LBS | HEART RATE: 80 BPM | HEIGHT: 68 IN | BODY MASS INDEX: 26.37 KG/M2 | DIASTOLIC BLOOD PRESSURE: 116 MMHG

## 2021-05-15 VITALS
HEART RATE: 116 BPM | BODY MASS INDEX: 24.4 KG/M2 | HEIGHT: 68 IN | SYSTOLIC BLOOD PRESSURE: 164 MMHG | DIASTOLIC BLOOD PRESSURE: 92 MMHG | WEIGHT: 161 LBS

## 2021-07-01 ENCOUNTER — TRANSCRIBE ORDERS (OUTPATIENT)
Dept: LAB | Facility: HOSPITAL | Age: 51
End: 2021-07-01

## 2021-07-01 ENCOUNTER — LAB (OUTPATIENT)
Dept: LAB | Facility: HOSPITAL | Age: 51
End: 2021-07-01

## 2021-07-01 DIAGNOSIS — N18.4 CHRONIC KIDNEY DISEASE, STAGE IV (SEVERE) (HCC): ICD-10-CM

## 2021-07-01 DIAGNOSIS — N18.4 CHRONIC KIDNEY DISEASE, STAGE IV (SEVERE) (HCC): Primary | ICD-10-CM

## 2021-07-01 LAB
25(OH)D3 SERPL-MCNC: 48.2 NG/ML
ALBUMIN SERPL-MCNC: 4.2 G/DL (ref 3.5–5.2)
ANION GAP SERPL CALCULATED.3IONS-SCNC: 13 MMOL/L (ref 5–15)
BACTERIA UR QL AUTO: NORMAL /HPF
BASOPHILS # BLD AUTO: 0.09 10*3/MM3 (ref 0–0.2)
BASOPHILS NFR BLD AUTO: 1.1 % (ref 0–1.5)
BILIRUB UR QL STRIP: NEGATIVE
BUN SERPL-MCNC: 46 MG/DL (ref 6–20)
BUN/CREAT SERPL: 17.3 (ref 7–25)
CALCIUM SPEC-SCNC: 9.3 MG/DL (ref 8.6–10.5)
CHLORIDE SERPL-SCNC: 98 MMOL/L (ref 98–107)
CLARITY UR: CLEAR
CO2 SERPL-SCNC: 23 MMOL/L (ref 22–29)
COLOR UR: YELLOW
CREAT SERPL-MCNC: 2.66 MG/DL (ref 0.76–1.27)
CREAT UR-MCNC: 33.8 MG/DL
DEPRECATED RDW RBC AUTO: 43 FL (ref 37–54)
EOSINOPHIL # BLD AUTO: 0.22 10*3/MM3 (ref 0–0.4)
EOSINOPHIL NFR BLD AUTO: 2.8 % (ref 0.3–6.2)
ERYTHROCYTE [DISTWIDTH] IN BLOOD BY AUTOMATED COUNT: 13.7 % (ref 12.3–15.4)
GFR SERPL CREATININE-BSD FRML MDRD: 26 ML/MIN/1.73
GLUCOSE SERPL-MCNC: 188 MG/DL (ref 65–99)
GLUCOSE UR STRIP-MCNC: ABNORMAL MG/DL
HCT VFR BLD AUTO: 34.7 % (ref 37.5–51)
HGB BLD-MCNC: 11.8 G/DL (ref 13–17.7)
HGB UR QL STRIP.AUTO: NEGATIVE
HYALINE CASTS UR QL AUTO: NORMAL /LPF
IMM GRANULOCYTES # BLD AUTO: 0.03 10*3/MM3 (ref 0–0.05)
IMM GRANULOCYTES NFR BLD AUTO: 0.4 % (ref 0–0.5)
KETONES UR QL STRIP: NEGATIVE
LEUKOCYTE ESTERASE UR QL STRIP.AUTO: NEGATIVE
LYMPHOCYTES # BLD AUTO: 1.42 10*3/MM3 (ref 0.7–3.1)
LYMPHOCYTES NFR BLD AUTO: 17.9 % (ref 19.6–45.3)
MCH RBC QN AUTO: 29.6 PG (ref 26.6–33)
MCHC RBC AUTO-ENTMCNC: 34 G/DL (ref 31.5–35.7)
MCV RBC AUTO: 87.2 FL (ref 79–97)
MONOCYTES # BLD AUTO: 0.63 10*3/MM3 (ref 0.1–0.9)
MONOCYTES NFR BLD AUTO: 7.9 % (ref 5–12)
NEUTROPHILS NFR BLD AUTO: 5.55 10*3/MM3 (ref 1.7–7)
NEUTROPHILS NFR BLD AUTO: 69.9 % (ref 42.7–76)
NITRITE UR QL STRIP: NEGATIVE
NRBC BLD AUTO-RTO: 0 /100 WBC (ref 0–0.2)
PH UR STRIP.AUTO: 6.5 [PH] (ref 5–8)
PHOSPHATE SERPL-MCNC: 3.7 MG/DL (ref 2.5–4.5)
PLATELET # BLD AUTO: 284 10*3/MM3 (ref 140–450)
PMV BLD AUTO: 10.5 FL (ref 6–12)
POTASSIUM SERPL-SCNC: 4.1 MMOL/L (ref 3.5–5.2)
PROT UR QL STRIP: ABNORMAL
PROT UR-MCNC: 110 MG/DL
PTH-INTACT SERPL-MCNC: 95.3 PG/ML (ref 15–65)
RBC # BLD AUTO: 3.98 10*6/MM3 (ref 4.14–5.8)
RBC # UR: NORMAL /HPF
REF LAB TEST METHOD: NORMAL
SODIUM SERPL-SCNC: 134 MMOL/L (ref 136–145)
SP GR UR STRIP: 1.01 (ref 1–1.03)
SQUAMOUS #/AREA URNS HPF: NORMAL /HPF
UROBILINOGEN UR QL STRIP: ABNORMAL
WBC # BLD AUTO: 7.94 10*3/MM3 (ref 3.4–10.8)
WBC UR QL AUTO: NORMAL /HPF

## 2021-07-01 PROCEDURE — 36415 COLL VENOUS BLD VENIPUNCTURE: CPT

## 2021-07-01 PROCEDURE — 83970 ASSAY OF PARATHORMONE: CPT

## 2021-07-01 PROCEDURE — 80069 RENAL FUNCTION PANEL: CPT

## 2021-07-01 PROCEDURE — 85025 COMPLETE CBC W/AUTO DIFF WBC: CPT

## 2021-07-01 PROCEDURE — 82306 VITAMIN D 25 HYDROXY: CPT

## 2021-07-01 PROCEDURE — 81001 URINALYSIS AUTO W/SCOPE: CPT

## 2021-07-01 PROCEDURE — 84156 ASSAY OF PROTEIN URINE: CPT

## 2021-07-01 PROCEDURE — 82570 ASSAY OF URINE CREATININE: CPT

## 2021-07-15 ENCOUNTER — OFFICE VISIT (OUTPATIENT)
Dept: INTERNAL MEDICINE | Facility: CLINIC | Age: 51
End: 2021-07-15

## 2021-07-15 ENCOUNTER — TELEPHONE (OUTPATIENT)
Dept: INTERNAL MEDICINE | Facility: CLINIC | Age: 51
End: 2021-07-15

## 2021-07-15 VITALS
HEART RATE: 84 BPM | DIASTOLIC BLOOD PRESSURE: 78 MMHG | WEIGHT: 154.4 LBS | RESPIRATION RATE: 14 BRPM | SYSTOLIC BLOOD PRESSURE: 149 MMHG | TEMPERATURE: 99.1 F | HEIGHT: 68 IN | OXYGEN SATURATION: 97 % | BODY MASS INDEX: 23.4 KG/M2

## 2021-07-15 DIAGNOSIS — E11.69 TYPE 2 DIABETES MELLITUS WITH OTHER SPECIFIED COMPLICATION, UNSPECIFIED WHETHER LONG TERM INSULIN USE (HCC): ICD-10-CM

## 2021-07-15 DIAGNOSIS — I25.10 CORONARY ARTERY DISEASE INVOLVING NATIVE HEART, ANGINA PRESENCE UNSPECIFIED, UNSPECIFIED VESSEL OR LESION TYPE: ICD-10-CM

## 2021-07-15 DIAGNOSIS — F32.A DEPRESSION, UNSPECIFIED DEPRESSION TYPE: ICD-10-CM

## 2021-07-15 DIAGNOSIS — M54.50 LOW BACK PAIN, UNSPECIFIED BACK PAIN LATERALITY, UNSPECIFIED CHRONICITY, UNSPECIFIED WHETHER SCIATICA PRESENT: ICD-10-CM

## 2021-07-15 DIAGNOSIS — I10 ESSENTIAL HYPERTENSION: ICD-10-CM

## 2021-07-15 DIAGNOSIS — Z59.819 HOUSING SITUATION UNSTABLE: ICD-10-CM

## 2021-07-15 DIAGNOSIS — I50.32 CHRONIC DIASTOLIC (CONGESTIVE) HEART FAILURE (HCC): ICD-10-CM

## 2021-07-15 DIAGNOSIS — R45.851 SUICIDAL IDEATION: ICD-10-CM

## 2021-07-15 DIAGNOSIS — N18.9 CHRONIC KIDNEY DISEASE, UNSPECIFIED CKD STAGE: ICD-10-CM

## 2021-07-15 DIAGNOSIS — F17.210 CIGARETTE NICOTINE DEPENDENCE WITHOUT COMPLICATION: ICD-10-CM

## 2021-07-15 DIAGNOSIS — H54.40 BLINDNESS OF LEFT EYE WITH NORMAL VISION IN CONTRALATERAL EYE: ICD-10-CM

## 2021-07-15 PROBLEM — I50.9 CHF (CONGESTIVE HEART FAILURE): Status: ACTIVE | Noted: 2021-07-15

## 2021-07-15 PROBLEM — E11.9 DIABETES MELLITUS, TYPE II: Status: ACTIVE | Noted: 2021-07-15

## 2021-07-15 PROBLEM — E11.40 DIABETIC NEUROPATHY: Status: ACTIVE | Noted: 2021-07-15

## 2021-07-15 PROCEDURE — 99214 OFFICE O/P EST MOD 30 MIN: CPT | Performed by: STUDENT IN AN ORGANIZED HEALTH CARE EDUCATION/TRAINING PROGRAM

## 2021-07-15 RX ORDER — ATORVASTATIN CALCIUM 20 MG/1
TABLET, FILM COATED ORAL
COMMUNITY
Start: 2021-03-27 | End: 2021-07-25 | Stop reason: SDUPTHER

## 2021-07-15 RX ORDER — DORZOLAMIDE HYDROCHLORIDE AND TIMOLOL MALEATE 20; 5 MG/ML; MG/ML
1 SOLUTION/ DROPS OPHTHALMIC 2 TIMES DAILY
COMMUNITY
Start: 2021-07-12 | End: 2022-09-23 | Stop reason: SDUPTHER

## 2021-07-15 RX ORDER — CLONIDINE HYDROCHLORIDE 0.1 MG/1
TABLET ORAL
COMMUNITY
Start: 2021-03-27 | End: 2021-07-25 | Stop reason: SDUPTHER

## 2021-07-15 RX ORDER — INSULIN DEGLUDEC INJECTION 100 U/ML
INJECTION, SOLUTION SUBCUTANEOUS
COMMUNITY
Start: 2021-04-15 | End: 2022-04-21 | Stop reason: SDUPTHER

## 2021-07-15 RX ORDER — NIFEDIPINE 30 MG/1
TABLET, EXTENDED RELEASE ORAL
COMMUNITY
End: 2021-12-14 | Stop reason: SDUPTHER

## 2021-07-15 RX ORDER — CITALOPRAM 40 MG/1
TABLET ORAL
COMMUNITY
Start: 2021-03-27 | End: 2021-07-25 | Stop reason: SDUPTHER

## 2021-07-15 RX ORDER — HYDRALAZINE HYDROCHLORIDE 100 MG/1
TABLET, FILM COATED ORAL
COMMUNITY
Start: 2021-06-22 | End: 2021-07-26 | Stop reason: SDUPTHER

## 2021-07-15 RX ORDER — SODIUM POLYSTYRENE SULFONATE 15 G/60ML
SUSPENSION ORAL; RECTAL
COMMUNITY

## 2021-07-15 RX ORDER — AMLODIPINE BESYLATE 10 MG/1
TABLET ORAL
COMMUNITY

## 2021-07-15 RX ORDER — CYCLOBENZAPRINE HCL 10 MG
TABLET ORAL EVERY 8 HOURS SCHEDULED
COMMUNITY
Start: 2021-03-03

## 2021-07-15 RX ORDER — ASPIRIN 81 MG/1
TABLET ORAL
COMMUNITY
Start: 2021-03-27 | End: 2022-03-14 | Stop reason: SDUPTHER

## 2021-07-15 RX ORDER — SODIUM BICARBONATE 650 MG/1
TABLET ORAL
COMMUNITY
Start: 2021-04-15 | End: 2022-07-22

## 2021-07-15 RX ORDER — FERROUS SULFATE 325(65) MG
TABLET ORAL
COMMUNITY

## 2021-07-15 RX ORDER — HYDROCODONE BITARTRATE AND ACETAMINOPHEN 7.5; 325 MG/1; MG/1
TABLET ORAL EVERY 6 HOURS
COMMUNITY
Start: 2021-03-03

## 2021-07-15 RX ORDER — ROPINIROLE 0.25 MG/1
TABLET, FILM COATED ORAL
COMMUNITY
Start: 2021-03-27 | End: 2021-07-25 | Stop reason: SDUPTHER

## 2021-07-15 RX ORDER — CARVEDILOL 25 MG/1
TABLET ORAL
COMMUNITY
Start: 2021-03-27 | End: 2021-07-25 | Stop reason: SDUPTHER

## 2021-07-15 RX ORDER — FUROSEMIDE 40 MG/1
TABLET ORAL
COMMUNITY
Start: 2021-03-27 | End: 2021-07-25 | Stop reason: SDUPTHER

## 2021-07-15 RX ORDER — LISINOPRIL 20 MG/1
TABLET ORAL
COMMUNITY
End: 2022-11-15 | Stop reason: SDUPTHER

## 2021-07-15 RX ORDER — TAMSULOSIN HYDROCHLORIDE 0.4 MG/1
CAPSULE ORAL
COMMUNITY
Start: 2021-03-27 | End: 2021-07-25 | Stop reason: SDUPTHER

## 2021-07-15 SDOH — ECONOMIC STABILITY - HOUSING INSECURITY: HOUSING INSTABILITY UNSPECIFIED: Z59.819

## 2021-07-15 NOTE — ASSESSMENT & PLAN NOTE
-CKD stage 4  -medical care complicated by housing instability, partly reliant on food genao for food needs

## 2021-07-15 NOTE — ASSESSMENT & PLAN NOTE
-depression screening 20, reports having thoughts of taking life today by pulling out in front of semi  -refuses ED evaluation, and left while I was trying to contact 911  -our clinic has contacted [facility]'s dept. for a welfare check

## 2021-07-15 NOTE — TELEPHONE ENCOUNTER
PER VERBAL REQUEST OF DR OLIVO, I CONTACTED [place] [employer] DEPARTMENT FOR WELLNESS CHECK ON PT     PT LEFT OFFICE AMA AFTER STATING HE FELT SUICIDAL     PT STATED TO OFFICE THAT HE WANTED TO DRIVE INTO A SEMI AND END HIS LIFE     [place] [place] [employer] DEPART[employer] STATED PT ADDRESS WAS IN THE [facility] AND OUT OF THEIR JURISDICTION     [facility] DISPATCH TRANSFERRED ME  DISPATCH     I REQUESTED TO SPEAK TO [facility]'S DEPARTMENT     911 DISPATCH TOOK ABOVE INFORMATION REGARDING PT     911 DISPATCH TOOK OFFICE INFORMATION FOR CALL BACK     911 DISPATCH WILL RETURN CALL ONCE PT CONTACT HAS BEEN MADE     PRACTICE MANAGER AWARE

## 2021-07-15 NOTE — ASSESSMENT & PLAN NOTE
-he has reduced tresiba from 20U to 10U of his own volition due to mesured hypoglycemic events in middle of the night (high 40s)  -on tresiba 10U and short acting 10U TID  -no showed appointment with Ira Larson

## 2021-07-15 NOTE — TELEPHONE ENCOUNTER
DEPUTY HECTOR MADE CONTACT WITH PT     PER [facility]'S DISPATCH, PT STATED HE DID NOT WANT TO HURT HIMSELF     POLICE LEFT PT AT HOME

## 2021-07-15 NOTE — PROGRESS NOTES
"Chief Complaint  Hypertension, Chronic Kidney Disease, Diabetes, and Depression    Subjective          Rogelio Dee presents to [facility]  History of Present Illness    Reports feeling depressed  Had thoughts of pulling out in front of a semi today, but didn't becausee he didn't want to do that to another person.  Also had thoughts of driving his car into a tree at high speeds.    Reports occasionally skipping some of his medicines.  Taking 10U rather than 20U of Tresiba as had wokeup with bs in his 40s.  Taking 10U of short acting TID.  No Showed appointment with Ira Larson.    Reports iron was not refilled at nephrologist's appointment, but otherwise no big changes.    Still smoking 10-20 cigarettes today.  Also smoking marijuana and vaping CBD oil (reports it alleviates some of his chronic pain).    Although previously [employer] an a Catholic, reports \"I have no use for God anymore.\"    Estranged from many members of his family.  Is reliant on food genao for much of his nutrition.  He reports he is danger of losing his home.    Left AMA when I left the room to contact 911 to arrange ambulance transport to ED.      Objective   Vital Signs:   /78   Pulse 84   Temp 99.1 °F (37.3 °C)   Resp 14   Ht 172.7 cm (68\")   Wt 70 kg (154 lb 6.4 oz)   SpO2 97%   BMI 23.48 kg/m²     Physical Exam  Constitutional:       General: He is not in acute distress.     Appearance: He is normal weight. He is not ill-appearing or toxic-appearing.   HENT:      Right Ear: External ear normal.   Eyes:      Conjunctiva/sclera: Conjunctivae normal.   Cardiovascular:      Rate and Rhythm: Normal rate and regular rhythm.      Pulses: Normal pulses.      Heart sounds: Normal heart sounds. No murmur heard.   No friction rub. No gallop.    Pulmonary:      Effort: Pulmonary effort is normal.      Breath sounds: Normal breath sounds. No wheezing, rhonchi or rales. "   Musculoskeletal:      Right lower leg: No edema.      Left lower leg: No edema.   Skin:     General: Skin is warm and dry.   Neurological:      General: No focal deficit present.      Mental Status: He is alert. Mental status is at baseline.   Psychiatric:         Attention and Perception: Attention normal.         Mood and Affect: Affect is flat.         Behavior: Behavior normal. Behavior is cooperative.         Thought Content: Thought content normal.         Cognition and Memory: Cognition normal.         Judgment: Judgment normal.          Result Review :   The following data was reviewed by: Simeon Lomeli MD on 07/15/2021:  Common labs    Common Labsle 4/11/21 4/11/21 4/12/21 4/12/21 7/1/21 7/1/21    0433 0433 0448 0448 1513 1513   Glucose      188 (A)   Glucose  196 (A)  179 (A)     BUN  61 (A)  63 (A)  46 (A)   Creatinine  4.31 (A)  4.75 (A)  2.66 (A)   eGFR Non African Am      26 (A)   Sodium  133 (A)  130 (A)  134 (A)   Potassium  3.8  3.8  4.1   Chloride  92 (A)  92 (A)  98   Calcium  8.8  8.7  9.3   Albumin      4.20   WBC 7.22  7.43  7.94    Hemoglobin 6.7 (A)  6.6 (A)  11.8 (A)    Hematocrit 21.2 (A)  20.8 (A)  34.7 (A)    Platelets 451 (A)  457 (A)  284    (A) Abnormal value               Procedures      Assessment and Plan    Diagnoses and all orders for this visit:    1. Essential hypertension    2. Chronic kidney disease, unspecified CKD stage  Assessment & Plan:  -CKD stage 4  -medical care complicated by housing instability, partly reliant on food genao for food needs      3. Cigarette nicotine dependence without complication    4. Low back pain, unspecified back pain laterality, unspecified chronicity, unspecified whether sciatica present    5. Type 2 diabetes mellitus with other specified complication, unspecified whether long term insulin use (CMS/[facility])  Assessment & Plan:  -he has reduced tresiba from 20U to 10U of his own volition due to mesured hypoglycemic events in middle of the night  (high 40s)  -on tresiba 10U and short acting 10U TID  -no showed appointment with Ira Larson      6. Blindness of left eye with normal vision in contralateral eye    7. Coronary artery disease involving native heart, angina presence unspecified, unspecified vessel or lesion type    8. Suicidal ideation    9. Depression, unspecified depression type  Assessment & Plan:  -depression screening 20, reports having thoughts of taking life today by pulling out in front of semi  -refuses ED evaluation, and left while I was trying to contact 911  -our clinic has contacted [facility]'s dept. for a welfare check        10. Housing situation unstable    11. Chronic diastolic (congestive) heart failure (CMS/HCC)      Follow Up   No follow-ups on file.  Patient was given instructions and counseling regarding his condition or for health maintenance advice. Please see specific information pulled into the AVS if appropriate.

## 2021-07-25 RX ORDER — CARVEDILOL 25 MG/1
25 TABLET ORAL 2 TIMES DAILY WITH MEALS
Qty: 180 TABLET | Refills: 2 | Status: SHIPPED | OUTPATIENT
Start: 2021-07-25 | End: 2022-04-29

## 2021-07-25 RX ORDER — CITALOPRAM 40 MG/1
40 TABLET ORAL DAILY
Qty: 90 TABLET | Refills: 2 | Status: SHIPPED | OUTPATIENT
Start: 2021-07-25 | End: 2022-04-21 | Stop reason: SDUPTHER

## 2021-07-25 RX ORDER — ROPINIROLE 0.25 MG/1
0.25 TABLET, FILM COATED ORAL NIGHTLY
Qty: 90 TABLET | Refills: 2 | Status: SHIPPED | OUTPATIENT
Start: 2021-07-25 | End: 2022-05-09

## 2021-07-25 RX ORDER — TAMSULOSIN HYDROCHLORIDE 0.4 MG/1
1 CAPSULE ORAL DAILY
Qty: 90 CAPSULE | Refills: 2 | Status: SHIPPED | OUTPATIENT
Start: 2021-07-25 | End: 2022-04-18

## 2021-07-25 RX ORDER — ATORVASTATIN CALCIUM 20 MG/1
20 TABLET, FILM COATED ORAL DAILY
Qty: 90 TABLET | Refills: 2 | Status: SHIPPED | OUTPATIENT
Start: 2021-07-25 | End: 2022-04-21 | Stop reason: SDUPTHER

## 2021-07-25 RX ORDER — FUROSEMIDE 40 MG/1
40 TABLET ORAL DAILY
Qty: 90 TABLET | Refills: 2 | Status: SHIPPED | OUTPATIENT
Start: 2021-07-25 | End: 2022-04-21 | Stop reason: SDUPTHER

## 2021-07-25 RX ORDER — CLONIDINE HYDROCHLORIDE 0.1 MG/1
0.1 TABLET ORAL 2 TIMES DAILY
Qty: 180 TABLET | Refills: 2 | Status: SHIPPED | OUTPATIENT
Start: 2021-07-25 | End: 2022-04-21 | Stop reason: SDUPTHER

## 2021-07-26 RX ORDER — ATORVASTATIN CALCIUM 20 MG/1
20 TABLET, FILM COATED ORAL DAILY
Qty: 90 TABLET | Refills: 2 | OUTPATIENT
Start: 2021-07-26

## 2021-07-26 RX ORDER — HYDRALAZINE HYDROCHLORIDE 100 MG/1
100 TABLET, FILM COATED ORAL 3 TIMES DAILY
Qty: 270 TABLET | Refills: 2 | Status: SHIPPED | OUTPATIENT
Start: 2021-07-26 | End: 2022-04-21 | Stop reason: SDUPTHER

## 2021-07-26 RX ORDER — FUROSEMIDE 40 MG/1
40 TABLET ORAL DAILY
Qty: 90 TABLET | Refills: 2 | OUTPATIENT
Start: 2021-07-26

## 2021-07-26 RX ORDER — CLONIDINE HYDROCHLORIDE 0.1 MG/1
0.1 TABLET ORAL 2 TIMES DAILY
Qty: 90 TABLET | Refills: 2 | OUTPATIENT
Start: 2021-07-26 | End: 2021-10-24

## 2021-07-26 RX ORDER — ROPINIROLE 0.25 MG/1
0.25 TABLET, FILM COATED ORAL NIGHTLY
Qty: 90 TABLET | Refills: 2 | OUTPATIENT
Start: 2021-07-26

## 2021-07-26 RX ORDER — TAMSULOSIN HYDROCHLORIDE 0.4 MG/1
1 CAPSULE ORAL DAILY
Qty: 90 CAPSULE | Refills: 2 | OUTPATIENT
Start: 2021-07-26

## 2021-07-26 RX ORDER — HYDRALAZINE HYDROCHLORIDE 100 MG/1
TABLET, FILM COATED ORAL
Qty: 90 TABLET | Refills: 2 | OUTPATIENT
Start: 2021-07-26

## 2021-07-26 RX ORDER — CARVEDILOL 25 MG/1
25 TABLET ORAL 2 TIMES DAILY WITH MEALS
Qty: 180 TABLET | Refills: 2 | OUTPATIENT
Start: 2021-07-26

## 2021-07-26 RX ORDER — CITALOPRAM 40 MG/1
40 TABLET ORAL DAILY
Qty: 90 TABLET | Refills: 2 | OUTPATIENT
Start: 2021-07-26

## 2021-07-26 NOTE — TELEPHONE ENCOUNTER
ATTEMPTED TO CONTACT PT PHARMACY PER PROVIDER'S REQUEST     PHARMACY WAS CLOSED FOR MEAL BREAK    WILL ATTEMPT TO CONTACT PHARMACY LATER    HUB PLEASE READ/ADVISE     Simeon Lomeli MD Lutter[place], Amanda  Replies will be sent to [facility]  Caller: Unspecified (1 week ago, 11:22 AM)  Please call their pharmacy and confirm that hydralazine 100 mg three times a day is the previously prescribed dosage. That's pretty high.

## 2021-07-26 NOTE — TELEPHONE ENCOUNTER
PER LUCAS, PT PREVIOUSLY PRESCRIBED 100MG BY MOUTH 3 TIMES PER DAY WITH FOOD BY DR OLIVO LAST RX 03/27/2021     PROVIDER VERBALLY REQUESTED A CALL TO PHARMACY FOR MEDICATION REFILL HISTORY FOR MEDICATION FOR PT     CONTACTED PHARMACY AND REQUESTED A HISTORY REPORT OF MEDICATION AT CURRENT PRESCRIBED DOSAGE FOR PT     PHARMACY TO FAX OVER COPY TO OFFICE FOR PROVIDER

## 2021-12-13 PROBLEM — E78.2 HYPERLIPEMIA, MIXED: Status: ACTIVE | Noted: 2021-12-13

## 2021-12-13 PROBLEM — I10 HYPERTENSION, ESSENTIAL: Status: ACTIVE | Noted: 2021-12-13

## 2021-12-13 PROBLEM — R00.2 PALPITATIONS: Status: ACTIVE | Noted: 2021-12-13

## 2021-12-13 NOTE — PROGRESS NOTES
[facility]  Cardiology progress Note    Patient Name: Rogelio Dee  : 1970    CHIEF COMPLAINT  Hypertension.      Subjective   Subjective     HISTORY OF PRESENT ILLNESS    Rogelio Dee is a 51 y.o. male with history of hypertension and palpitations.  No further palpitations.  No chest pain.    Review of Systems:   Constitutional no fever,  no weight loss   Skin no rash   Otolaryngeal no difficulty swallowing   Cardiovascular See HPI   Pulmonary no cough, no sputum production   Gastrointestinal no constipation, no diarrhea   Genitourinary no dysuria, no hematuria   Hematologic no easy bruisability, no abnormal bleeding   Musculoskeletal no muscle pain   Neurologic no dizziness, no falls         Personal History     Social History:  reports that he has been smoking cigarettes. He has been smoking about 1.00 pack per day. He has never used smokeless tobacco. He reports that he does not drink alcohol and does not use drugs.    Home Medications:  Current Outpatient Medications on File Prior to Visit   Medication Sig   • Admelog 100 UNIT/ML injection ADMINISTER 15 UNITS UNDER THE SKIN BEFORE A MEAL AS NEEDED PER SLIDING SCALE   • amLODIPine (NORVASC) 10 MG tablet amlodipine 10 mg tablet   • aspirin (aspirin) 81 MG EC tablet aspirin 81 mg tablet,delayed release   • atorvastatin (LIPITOR) 20 MG tablet Take 1 tablet by mouth Daily.   • carvedilol (COREG) 25 MG tablet Take 1 tablet by mouth 2 (Two) Times a Day With Meals.   • Cholecalciferol 50 MCG (2000 UT) capsule cholecalciferol (vitamin D3) 50 mcg (2,000 unit) capsule   TAKE 1 CAPSULE BY MOUTH DAILY FOR 30 DAYS   • citalopram (CeleXA) 40 MG tablet Take 1 tablet by mouth Daily.   • cloNIDine (CATAPRES) 0.1 MG tablet Take 1 tablet by mouth 2 (Two) Times a Day.   • cyclobenzaprine (FLEXERIL) 10 MG tablet Every 8 (Eight) Hours.   • dorzolamide-timolol (COSOPT) 22.3-6.8 MG/ML ophthalmic solution Administer 1 drop to both eyes 2 (Two) Times a Day.   •  ferrous sulfate 325 (65 FE) MG tablet FeroSul 325 mg (65 mg iron) tablet   TAKE 1 TABLET BY MOUTH TWICE DAILY   • furosemide (LASIX) 40 MG tablet Take 1 tablet by mouth Daily.   • hydrALAZINE (APRESOLINE) 100 MG tablet Take 1 tablet by mouth 3 (Three) Times a Day.   • HYDROcodone-acetaminophen (NORCO) 7.5-325 MG per tablet Every 6 (Six) Hours.   • insulin degludec (Tresiba FlexTouch) 100 UNIT/ML solution pen-injector injection Tresiba FlexTouch U-100 insulin 100 unit/mL (3 mL) subcutaneous pen   INJECT 25 UNITS SUBCUTANEOUS EVERY NIGHT AT BEDTIME AS NEEDED.   • lisinopril (PRINIVIL,ZESTRIL) 20 MG tablet lisinopril 20 mg tablet   TAKE 1 TABLET BY MOUTH DAILY   • rOPINIRole (REQUIP) 0.25 MG tablet Take 1 tablet by mouth Every Night. Take 1 hour before bedtime.   • sodium bicarbonate 650 MG tablet sodium bicarbonate 650 mg tablet   • sodium polystyrene (SPS) 15 GM/60ML suspension SPS (with sorbitol) 15 gram-20 gram/60 mL oral suspension   • tamsulosin (FLOMAX) 0.4 MG capsule 24 hr capsule Take 1 capsule by mouth Daily.   • [DISCONTINUED] NIFEdipine XL (Procardia XL) 30 MG 24 hr tablet Procardia XL 30 mg oral tablet extended release 24hr take 1 tablet (30 mg) by oral route once daily   Suspended     No current facility-administered medications on file prior to visit.     Allergies:  Allergies   Allergen Reactions   • Oxycodone Mental Status Change       Objective    Objective       Vitals:   Heart Rate:  [78-80] 78  BP: (158-180)/(82) 158/82  Body mass index is 26.61 kg/m².     Physical Exam:   Constitutional: Awake, alert, No acute distress    Eyes: PERRLA, sclerae anicteric, no conjunctival injection   HENT: NCAT, mucous membranes moist   Neck: Supple, no thyromegaly, no lymphadenopathy, trachea midline   Respiratory: Clear to auscultation bilaterally, nonlabored respirations    Cardiovascular: RRR, no murmurs or rubs. Palpable pedal pulses bilaterally   Musculoskeletal: No bilateral ankle edema, no cyanosis to  extremities   Psychiatric: Appropriate affect, cooperative   Neurologic: Oriented x 3, strength symmetric in all extremities, Cranial Nerves grossly intact to confrontation, speech clear   Skin: No rashes.    Result Review    Result Review:  I have personally reviewed the available results from  [x]  Laboratory  [x]  EKG  [x]  Cardiology  [x]  Medications  [x]  Old records  []  Other:   Procedures  No results found for: CHOL  Lab Results   Component Value Date    TRIG 74 04/05/2021    TRIG 122 03/26/2021    TRIG 164 (H) 09/03/2020     Lab Results   Component Value Date    HDL 32 (L) 04/05/2021    HDL 33 (L) 03/26/2021    HDL 25 (L) 09/03/2020     Lab Results   Component Value Date    LDL 34 (L) 04/05/2021    LDL 91 03/26/2021    LDL 74 09/03/2020     Lab Results   Component Value Date    VLDL 15 04/05/2021    VLDL 24 03/26/2021    VLDL 33 09/03/2020        Impression/Plan:  1.  Essential hypertension Uncontrolled: Continue amlodipine 10 mg once a day.  Continue clonidine 0.1 mg twice daily.  Continue lisinopril 20 mg twice daily.  Increase Procardia XL to 60 mg once a day.  Blood pressure better controlled at home.  2.  Hyperlipidemia: Continue Lipitor 20 mg a day.  Lipid profile reviewed.  Shows an LDL of 74.  3.  Palpitations/sinus tachycardia: Continue carvedilol 25 mg twice daily.  Heart rate stable.           Javid White MD   12/14/21   13:46 EST

## 2021-12-14 ENCOUNTER — OFFICE VISIT (OUTPATIENT)
Dept: CARDIOLOGY | Facility: CLINIC | Age: 51
End: 2021-12-14

## 2021-12-14 VITALS
SYSTOLIC BLOOD PRESSURE: 158 MMHG | HEIGHT: 68 IN | HEART RATE: 78 BPM | DIASTOLIC BLOOD PRESSURE: 82 MMHG | WEIGHT: 175 LBS | BODY MASS INDEX: 26.52 KG/M2

## 2021-12-14 DIAGNOSIS — I10 HYPERTENSION, ESSENTIAL: Primary | ICD-10-CM

## 2021-12-14 DIAGNOSIS — E78.2 HYPERLIPEMIA, MIXED: ICD-10-CM

## 2021-12-14 DIAGNOSIS — R00.2 PALPITATIONS: ICD-10-CM

## 2021-12-14 PROCEDURE — 99214 OFFICE O/P EST MOD 30 MIN: CPT | Performed by: SPECIALIST

## 2021-12-14 RX ORDER — NIFEDIPINE 30 MG/1
60 TABLET, EXTENDED RELEASE ORAL DAILY
Qty: 90 TABLET | Refills: 5 | Status: SHIPPED | OUTPATIENT
Start: 2021-12-14 | End: 2022-09-13

## 2022-03-14 ENCOUNTER — OFFICE VISIT (OUTPATIENT)
Dept: CARDIOLOGY | Facility: CLINIC | Age: 52
End: 2022-03-14

## 2022-03-14 VITALS
HEIGHT: 68 IN | DIASTOLIC BLOOD PRESSURE: 71 MMHG | WEIGHT: 171 LBS | HEART RATE: 81 BPM | BODY MASS INDEX: 25.91 KG/M2 | SYSTOLIC BLOOD PRESSURE: 166 MMHG

## 2022-03-14 DIAGNOSIS — I10 HYPERTENSION, ESSENTIAL: ICD-10-CM

## 2022-03-14 DIAGNOSIS — E78.2 HYPERLIPEMIA, MIXED: ICD-10-CM

## 2022-03-14 DIAGNOSIS — F17.200 SMOKER: ICD-10-CM

## 2022-03-14 DIAGNOSIS — I50.32 CHRONIC DIASTOLIC (CONGESTIVE) HEART FAILURE: Primary | ICD-10-CM

## 2022-03-14 PROBLEM — E11.3513: Status: ACTIVE | Noted: 2021-03-24

## 2022-03-14 PROBLEM — H25.813 COMBINED FORMS OF AGE-RELATED CATARACT, BILATERAL: Status: ACTIVE | Noted: 2021-03-23

## 2022-03-14 PROBLEM — I25.10 CORONARY ARTERY DISEASE INVOLVING NATIVE HEART: Status: RESOLVED | Noted: 2021-07-15 | Resolved: 2022-03-14

## 2022-03-14 PROBLEM — I50.9 CHF (CONGESTIVE HEART FAILURE): Status: RESOLVED | Noted: 2021-07-15 | Resolved: 2022-03-14

## 2022-03-14 PROBLEM — M51.26 DISPLACEMENT OF LUMBAR INTERVERTEBRAL DISC WITHOUT MYELOPATHY: Status: ACTIVE | Noted: 2022-03-14

## 2022-03-14 PROBLEM — G89.4 CHRONIC PAIN DISORDER: Status: ACTIVE | Noted: 2022-03-14

## 2022-03-14 PROBLEM — N18.4 STAGE 4 CHRONIC KIDNEY DISEASE (HCC): Status: ACTIVE | Noted: 2021-07-15

## 2022-03-14 PROBLEM — R00.2 PALPITATIONS: Status: RESOLVED | Noted: 2021-12-13 | Resolved: 2022-03-14

## 2022-03-14 PROBLEM — H40.053 OCULAR HYPERTENSION, BILATERAL: Status: ACTIVE | Noted: 2021-03-23

## 2022-03-14 PROCEDURE — 99214 OFFICE O/P EST MOD 30 MIN: CPT | Performed by: NURSE PRACTITIONER

## 2022-03-14 RX ORDER — ASPIRIN 81 MG/1
81 TABLET ORAL DAILY
Qty: 90 TABLET | Refills: 1 | Status: SHIPPED | OUTPATIENT
Start: 2022-03-14 | End: 2022-09-23 | Stop reason: SDUPTHER

## 2022-03-14 NOTE — PROGRESS NOTES
"Chief Complaint  Congestive Heart Failure, Coronary Artery Disease, Hypertension (/), and Hyperlipidemia    Subjective            History of Present Illness  Rogelio Dee is a 52-year-old white/ male patient who presents to the office today for follow-up.  He has chronic diastolic CHF, hypertension, hyperlipidemia, and is a smoker.  He reports that he hurt his back approximately 3 days ago while \"moving a workbench\" and he has had lots of pain which is why his blood pressure is elevated today.  He reports otherwise his blood pressure at home is \"normal\".  He denies any chest pain, shortness of breath, lightheadedness/dizziness, palpitations, or edema.  He reports compliance with all of his medications except for aspirin because he has not been able to afford it.    PMH  Past Medical History:   Diagnosis Date   • Blindness of left eye    • Chronic diastolic CHF 07/15/2021   • Chronic pain syndrome    • Diabetes mellitus, type 2 (HCC)    • Diabetic neuropathy 07/15/2021   • Hyperlipidemia    • Hypertension    • Lumbosacral disc herniation     L5-S1   • Metabolic acidosis    • Smoker 07/15/2021   • Stage 4 CKD 07/15/2021   • Status post amputation of great toe, left ([facility])    • Status post amputation of great toe, right ([facility])    • Type 2 DM with proliferative retinopathy with macular edema, bilateral 03/24/2021    PDR OD - New [facility] since last visit.  Missed appt in [place] for PRP, partially because of ride issues and partially because he is hesitant to undergo PRP because it is uncomfortable.  Avastin today.  Discussed retrobulbar for PRP vs. Treatment with anti-VEGF exclusively.  Pt will consider options.  If doing PRP, will try to get done in 1 session in order to minimize trips to [place].  PDR OS -         ALLERGY  Allergies   Allergen Reactions   • Oxycodone Mental Status Change          SURGICALHX  Past Surgical History:   Procedure Laterality Date   • LUMBAR DISC SURGERY     • TOE AMPUTATION      "       SOC  Social History     Socioeconomic History   • Marital status:    Tobacco Use   • Smoking status: Current Every Day Smoker     Packs/day: 1.00     Types: Cigarettes   • Smokeless tobacco: Never Used   Vaping Use   • Vaping Use: Never used   Substance and Sexual Activity   • Alcohol use: Never   • Drug use: Never   • Sexual activity: Defer         FAMHX  Family History   Family history unknown: Yes          MEDSIGONLY  Current Outpatient Medications on File Prior to Visit   Medication Sig   • Admelog 100 UNIT/ML injection ADMINISTER 15 UNITS UNDER THE SKIN BEFORE A MEAL AS NEEDED PER SLIDING SCALE   • amLODIPine (NORVASC) 10 MG tablet amlodipine 10 mg tablet   • aspirin 81 MG EC tablet aspirin 81 mg tablet,delayed release   • atorvastatin (LIPITOR) 20 MG tablet Take 1 tablet by mouth Daily.   • carvedilol (COREG) 25 MG tablet Take 1 tablet by mouth 2 (Two) Times a Day With Meals.   • Cholecalciferol 50 MCG (2000 UT) capsule cholecalciferol (vitamin D3) 50 mcg (2,000 unit) capsule   TAKE 1 CAPSULE BY MOUTH DAILY FOR 30 DAYS   • citalopram (CeleXA) 40 MG tablet Take 1 tablet by mouth Daily.   • cloNIDine (CATAPRES) 0.1 MG tablet Take 1 tablet by mouth 2 (Two) Times a Day.   • cyclobenzaprine (FLEXERIL) 10 MG tablet Every 8 (Eight) Hours.   • dorzolamide-timolol (COSOPT) 22.3-6.8 MG/ML ophthalmic solution Administer 1 drop to both eyes 2 (Two) Times a Day.   • ferrous sulfate 325 (65 FE) MG tablet FeroSul 325 mg (65 mg iron) tablet   TAKE 1 TABLET BY MOUTH TWICE DAILY   • furosemide (LASIX) 40 MG tablet Take 1 tablet by mouth Daily.   • hydrALAZINE (APRESOLINE) 100 MG tablet Take 1 tablet by mouth 3 (Three) Times a Day.   • HYDROcodone-acetaminophen (NORCO) 7.5-325 MG per tablet Every 6 (Six) Hours.   • insulin degludec (Tresiba FlexTouch) 100 UNIT/ML solution pen-injector injection Tresiba FlexTouch U-100 insulin 100 unit/mL (3 mL) subcutaneous pen   INJECT 25 UNITS SUBCUTANEOUS EVERY NIGHT AT BEDTIME  "AS NEEDED.   • lisinopril (PRINIVIL,ZESTRIL) 20 MG tablet lisinopril 20 mg tablet   TAKE 1 TABLET BY MOUTH DAILY   • NIFEdipine XL (Procardia XL) 30 MG 24 hr tablet Take 2 tablets by mouth Daily.   • rOPINIRole (REQUIP) 0.25 MG tablet Take 1 tablet by mouth Every Night. Take 1 hour before bedtime.   • sodium bicarbonate 650 MG tablet sodium bicarbonate 650 mg tablet   • sodium polystyrene (KAYEXALATE) 15 GM/60ML suspension SPS (with sorbitol) 15 gram-20 gram/60 mL oral suspension   • tamsulosin (FLOMAX) 0.4 MG capsule 24 hr capsule Take 1 capsule by mouth Daily.     No current facility-administered medications on file prior to visit.         Objective   /71   Pulse 81   Ht 172.7 cm (68\")   Wt 77.6 kg (171 lb)   BMI 26.00 kg/m²     Physical Exam  HENT:      Head: Normocephalic.   Neck:      Vascular: No carotid bruit.   Cardiovascular:      Rate and Rhythm: Normal rate and regular rhythm.      Pulses: Normal pulses.      Heart sounds: Normal heart sounds. No murmur heard.  Pulmonary:      Effort: Pulmonary effort is normal.      Breath sounds: Normal breath sounds.   Musculoskeletal:      Cervical back: Neck supple.      Right lower leg: No edema.      Left lower leg: No edema.   Skin:     General: Skin is dry.      Capillary Refill: Capillary refill takes less than 2 seconds.   Neurological:      Mental Status: He is alert and oriented to person, place, and time.   Psychiatric:         Behavior: Behavior normal.       Result Review :   The following data was reviewed by: ALONDRA Oakes on 03/14/2022:  No results found for: PROBNP  CMP    CMP 7/1/21   Glucose 188 (A)   BUN 46 (A)   Creatinine 2.66 (A)   eGFR Non African Am 26 (A)   Sodium 134 (A)   Potassium 4.1   Chloride 98   Calcium 9.3   Albumin 4.20   (A) Abnormal value            CBC w/diff    CBC w/Diff 7/1/21   WBC 7.94   RBC 3.98 (A)   Hemoglobin 11.8 (A)   Hematocrit 34.7 (A)   MCV 87.2   MCH 29.6   MCHC 34.0   RDW 13.7   Platelets 284 "   Neutrophil Rel % 69.9   Immature Granulocyte Rel % 0.4   Lymphocyte Rel % 17.9 (A)   Monocyte Rel % 7.9   Eosinophil Rel % 2.8   Basophil Rel % 1.1   (A) Abnormal value             Lab Results   Component Value Date    TSH 2.040 04/05/2021      Lab Results   Component Value Date    FREET4 1.2 04/05/2021      No results found for: DDIMERQUANT  Magnesium   Date Value Ref Range Status   04/10/2021 1.67 1.60 - 2.30 mg/dL Final      No results found for: DIGOXIN   Lab Results   Component Value Date    TROPONINT 0.22 (H) 04/06/2021           Lipid Panel    Lipid Panel 4/5/21   Total Cholesterol 81 (A)   Triglycerides 74   HDL Cholesterol 32 (A)   VLDL Cholesterol 15   LDL Cholesterol  34 (A)   (A) Abnormal value          4/6/2021 echo:  CONCLUSIONS:    1.  Normal left ventricular systolic function with an estimated ejection        fraction of 65%.  No regional wall motion abnormalities were observed.    2.  Grade 2 diastolic dysfunction.    3.  Mild to moderate LVH   4.  Moderately dilated left atrium.    5.  Mild tricuspid regurgitation, but no hemodynamically significant valvular disease.    6.  Estimated pulmonary arterial systolic pressure was elevated at 60-65        mmHg.    7.  Borderline dilated aortic root.    8.  A very small pericardial effusion is present.         Assessment and Plan    Diagnoses and all orders for this visit:    1. Chronic diastolic CHF (Primary)  Symptomatically stable at this time and euvolemic on exam, continue carvedilol 25 mg twice daily, Lasix 40 mg daily, and lisinopril 20 mg daily.  Repeat echocardiogram with next visit.    2. Hypertension, essential  Elevated secondary to acute pain, advised patient to continue to check blood pressure at home and to notify office of any out of range once his pain is under control.  He verbalizes understanding and agrees.  Continue nifedipine 60 mg daily, lisinopril 20 mg daily, hydralazine 100 mg 3 times daily, Lasix 40 mg daily, clonidine 0.1 mg  twice daily, and amlodipine 10 mg daily.  Check BMP in 2 weeks to evaluate renal function  -     Basic Metabolic Panel; Future    3. Hyperlipemia, mixed  Last lipid panel was 4/5/2021 with LDL of 34 which is within his goal range, continue atorvastatin 20 mg daily and obtain new fasting lipid and hepatic function panel.  -     Lipid Panel; Future  -     Hepatic Function Panel; Future    4. Smoker  Tobacco abuse cessation counseling provided to patient today but was unable to get patient to commit to cessation plan.    Other orders  -     aspirin 81 MG EC tablet; Take 1 tablet by mouth Daily.  Dispense: 90 tablet; Refill: 1          Follow Up   Return in about 8 months (around 11/14/2022) for Follow up with Dr White.    Patient was given instructions and counseling regarding his condition or for health maintenance advice. Please see specific information pulled into the AVS if appropriate.     Rogelio Dee  reports that he has been smoking cigarettes. He has been smoking about 1.00 pack per day. He has never used smokeless tobacco.. I have educated him on the risk of diseases from using tobacco products such as cancer, COPD and heart disease.     I advised him to quit and he is not willing to quit.    I spent 4 minutes counseling the patient.           Mikayla Schilling, ALONDRA  03/14/22  13:06 EDT    Dictated Utilizing Dragon Dictation

## 2022-04-18 RX ORDER — CLONIDINE HYDROCHLORIDE 0.1 MG/1
TABLET ORAL
Qty: 180 TABLET | Refills: 2 | OUTPATIENT
Start: 2022-04-18

## 2022-04-18 RX ORDER — ATORVASTATIN CALCIUM 20 MG/1
20 TABLET, FILM COATED ORAL DAILY
Qty: 90 TABLET | Refills: 2 | OUTPATIENT
Start: 2022-04-18

## 2022-04-18 RX ORDER — TAMSULOSIN HYDROCHLORIDE 0.4 MG/1
1 CAPSULE ORAL DAILY
Qty: 90 CAPSULE | Refills: 2 | Status: SHIPPED | OUTPATIENT
Start: 2022-04-18 | End: 2023-01-09

## 2022-04-18 RX ORDER — HYDRALAZINE HYDROCHLORIDE 100 MG/1
TABLET, FILM COATED ORAL
Qty: 270 TABLET | Refills: 2 | OUTPATIENT
Start: 2022-04-18

## 2022-04-18 RX ORDER — FUROSEMIDE 40 MG/1
40 TABLET ORAL DAILY
Qty: 90 TABLET | Refills: 2 | OUTPATIENT
Start: 2022-04-18

## 2022-04-18 RX ORDER — ROPINIROLE 0.25 MG/1
TABLET, FILM COATED ORAL
Qty: 90 TABLET | Refills: 2 | OUTPATIENT
Start: 2022-04-18

## 2022-04-18 RX ORDER — CITALOPRAM 40 MG/1
40 TABLET ORAL DAILY
Qty: 90 TABLET | Refills: 2 | OUTPATIENT
Start: 2022-04-18

## 2022-04-18 NOTE — TELEPHONE ENCOUNTER
Diuretics Protocol Failed 04/15/2022 03:47 PM   Protocol Details  Normal serum sodium in past 12 months    GFR> 30 ml/min in past year    Normal serum potassium in past 12 months    Recent or future visit with authorizing provider    Blood pressure on record

## 2022-04-18 NOTE — TELEPHONE ENCOUNTER
PT(PATIENT) VERIFIED     CONTACTED PT(PATIENT) PER PROVIDER'S INSTRUCTIONS    Simeon Lomeli MD P [facility]  Caller: Unspecified (3 days ago,  3:47 PM)  The last time that I saw Mr. Dee was in July, when he left AMA after reporting that he was feeling suicidal.     Could you contact them to schedule a clinic appointment for an annual physical exam?

## 2022-04-18 NOTE — TELEPHONE ENCOUNTER
Antiparkinson Dopaminergic Meds Failed 04/15/2022 03:47 PM   Protocol Details  BP on record    AST < 55 OR ALT < 90 in the past 12 months    No positive pregnancy test in the past 12 months    Recent or future visit with authorizing provider (12M/90D)    CBC in past 12 months    Serum Creatinine in the past 12 months

## 2022-04-18 NOTE — TELEPHONE ENCOUNTER
HMG-CoA Reductase Inhibitors (Statins) Protocol Failed 04/15/2022 03:47 PM   Protocol Details  Lipid panel in past 12 months    ALK Phos in past 12 months    ALT in past 12 months    AST in past 12 months    Recent or future visit with authorizing provider

## 2022-04-18 NOTE — TELEPHONE ENCOUNTER
BPH Medications Protocol Passed 04/15/2022 03:47 PM    Visit with authorizing provider in past 12 months or future appt within 30 days

## 2022-04-20 PROBLEM — F17.210 CIGARETTE NICOTINE DEPENDENCE WITHOUT COMPLICATION: Status: ACTIVE | Noted: 2022-04-20

## 2022-04-21 ENCOUNTER — OFFICE VISIT (OUTPATIENT)
Dept: INTERNAL MEDICINE | Facility: CLINIC | Age: 52
End: 2022-04-21

## 2022-04-21 VITALS
WEIGHT: 172.8 LBS | OXYGEN SATURATION: 98 % | BODY MASS INDEX: 26.19 KG/M2 | HEART RATE: 73 BPM | TEMPERATURE: 97.7 F | HEIGHT: 68 IN | DIASTOLIC BLOOD PRESSURE: 85 MMHG | SYSTOLIC BLOOD PRESSURE: 142 MMHG

## 2022-04-21 DIAGNOSIS — G89.4 CHRONIC PAIN DISORDER: ICD-10-CM

## 2022-04-21 DIAGNOSIS — H54.40 BLINDNESS OF LEFT EYE WITH NORMAL VISION IN CONTRALATERAL EYE: ICD-10-CM

## 2022-04-21 DIAGNOSIS — Z86.59 HISTORY OF SUICIDAL IDEATION: ICD-10-CM

## 2022-04-21 DIAGNOSIS — F17.210 CIGARETTE NICOTINE DEPENDENCE WITHOUT COMPLICATION: ICD-10-CM

## 2022-04-21 DIAGNOSIS — E66.3 OVERWEIGHT (BMI 25.0-29.9): ICD-10-CM

## 2022-04-21 DIAGNOSIS — I50.32 CHRONIC DIASTOLIC (CONGESTIVE) HEART FAILURE: ICD-10-CM

## 2022-04-21 DIAGNOSIS — F32.89 OTHER DEPRESSION: ICD-10-CM

## 2022-04-21 DIAGNOSIS — E11.3513 TYPE 2 DIABETES MELLITUS WITH BOTH EYES AFFECTED BY PROLIFERATIVE RETINOPATHY AND MACULAR EDEMA, WITH LONG-TERM CURRENT USE OF INSULIN: ICD-10-CM

## 2022-04-21 DIAGNOSIS — Z00.00 ANNUAL PHYSICAL EXAM: Primary | ICD-10-CM

## 2022-04-21 DIAGNOSIS — I10 ESSENTIAL HYPERTENSION: ICD-10-CM

## 2022-04-21 DIAGNOSIS — Z13.29 SCREENING FOR THYROID DISORDER: ICD-10-CM

## 2022-04-21 DIAGNOSIS — Z59.41 FOOD INSECURITY: ICD-10-CM

## 2022-04-21 DIAGNOSIS — Z79.4 TYPE 2 DIABETES MELLITUS WITH BOTH EYES AFFECTED BY PROLIFERATIVE RETINOPATHY AND MACULAR EDEMA, WITH LONG-TERM CURRENT USE OF INSULIN: ICD-10-CM

## 2022-04-21 DIAGNOSIS — E78.2 HYPERLIPEMIA, MIXED: ICD-10-CM

## 2022-04-21 DIAGNOSIS — N18.4 STAGE 4 CHRONIC KIDNEY DISEASE: ICD-10-CM

## 2022-04-21 DIAGNOSIS — Z12.11 SCREENING FOR COLON CANCER: ICD-10-CM

## 2022-04-21 LAB
ALBUMIN SERPL-MCNC: 4.7 G/DL (ref 3.5–5.2)
ALBUMIN UR-MCNC: 84.7 MG/DL
ALBUMIN/GLOB SERPL: 1.3 G/DL
ALP SERPL-CCNC: 126 U/L (ref 39–117)
ALT SERPL W P-5'-P-CCNC: 13 U/L (ref 1–41)
ANION GAP SERPL CALCULATED.3IONS-SCNC: 16.1 MMOL/L (ref 5–15)
AST SERPL-CCNC: 11 U/L (ref 1–40)
BASOPHILS # BLD AUTO: 0.11 10*3/MM3 (ref 0–0.2)
BASOPHILS NFR BLD AUTO: 1 % (ref 0–1.5)
BILIRUB SERPL-MCNC: 0.2 MG/DL (ref 0–1.2)
BUN SERPL-MCNC: 69 MG/DL (ref 6–20)
BUN/CREAT SERPL: 21.7 (ref 7–25)
CALCIUM SPEC-SCNC: 9.4 MG/DL (ref 8.6–10.5)
CHLORIDE SERPL-SCNC: 92 MMOL/L (ref 98–107)
CHOLEST SERPL-MCNC: 133 MG/DL (ref 0–200)
CO2 SERPL-SCNC: 20.9 MMOL/L (ref 22–29)
CREAT SERPL-MCNC: 3.18 MG/DL (ref 0.76–1.27)
CREAT UR-MCNC: 40.1 MG/DL
DEPRECATED RDW RBC AUTO: 38.1 FL (ref 37–54)
EGFRCR SERPLBLD CKD-EPI 2021: 22.7 ML/MIN/1.73
EOSINOPHIL # BLD AUTO: 0.26 10*3/MM3 (ref 0–0.4)
EOSINOPHIL NFR BLD AUTO: 2.5 % (ref 0.3–6.2)
ERYTHROCYTE [DISTWIDTH] IN BLOOD BY AUTOMATED COUNT: 12 % (ref 12.3–15.4)
GLOBULIN UR ELPH-MCNC: 3.5 GM/DL
GLUCOSE SERPL-MCNC: 339 MG/DL (ref 65–99)
HBA1C MFR BLD: 10.5 % (ref 4.8–5.6)
HCT VFR BLD AUTO: 33.9 % (ref 37.5–51)
HDLC SERPL-MCNC: 30 MG/DL (ref 40–60)
HGB BLD-MCNC: 11.1 G/DL (ref 13–17.7)
HOLD SPECIMEN: NORMAL
IMM GRANULOCYTES # BLD AUTO: 0.07 10*3/MM3 (ref 0–0.05)
IMM GRANULOCYTES NFR BLD AUTO: 0.7 % (ref 0–0.5)
LDLC SERPL CALC-MCNC: 70 MG/DL (ref 0–100)
LDLC/HDLC SERPL: 2.13 {RATIO}
LYMPHOCYTES # BLD AUTO: 1.47 10*3/MM3 (ref 0.7–3.1)
LYMPHOCYTES NFR BLD AUTO: 13.9 % (ref 19.6–45.3)
MCH RBC QN AUTO: 28.8 PG (ref 26.6–33)
MCHC RBC AUTO-ENTMCNC: 32.7 G/DL (ref 31.5–35.7)
MCV RBC AUTO: 87.8 FL (ref 79–97)
MICROALBUMIN/CREAT UR: 2112.2 MG/G
MONOCYTES # BLD AUTO: 0.82 10*3/MM3 (ref 0.1–0.9)
MONOCYTES NFR BLD AUTO: 7.7 % (ref 5–12)
NEUTROPHILS NFR BLD AUTO: 7.88 10*3/MM3 (ref 1.7–7)
NEUTROPHILS NFR BLD AUTO: 74.2 % (ref 42.7–76)
NRBC BLD AUTO-RTO: 0 /100 WBC (ref 0–0.2)
PLATELET # BLD AUTO: 340 10*3/MM3 (ref 140–450)
PMV BLD AUTO: 10.7 FL (ref 6–12)
POTASSIUM SERPL-SCNC: 4.1 MMOL/L (ref 3.5–5.2)
PROT SERPL-MCNC: 8.2 G/DL (ref 6–8.5)
RBC # BLD AUTO: 3.86 10*6/MM3 (ref 4.14–5.8)
SODIUM SERPL-SCNC: 129 MMOL/L (ref 136–145)
TRIGL SERPL-MCNC: 195 MG/DL (ref 0–150)
TSH SERPL DL<=0.05 MIU/L-ACNC: 3.61 UIU/ML (ref 0.27–4.2)
VLDLC SERPL-MCNC: 33 MG/DL (ref 5–40)
WBC NRBC COR # BLD: 10.61 10*3/MM3 (ref 3.4–10.8)

## 2022-04-21 PROCEDURE — 80053 COMPREHEN METABOLIC PANEL: CPT | Performed by: STUDENT IN AN ORGANIZED HEALTH CARE EDUCATION/TRAINING PROGRAM

## 2022-04-21 PROCEDURE — 85025 COMPLETE CBC W/AUTO DIFF WBC: CPT | Performed by: STUDENT IN AN ORGANIZED HEALTH CARE EDUCATION/TRAINING PROGRAM

## 2022-04-21 PROCEDURE — 82043 UR ALBUMIN QUANTITATIVE: CPT | Performed by: STUDENT IN AN ORGANIZED HEALTH CARE EDUCATION/TRAINING PROGRAM

## 2022-04-21 PROCEDURE — 84443 ASSAY THYROID STIM HORMONE: CPT | Performed by: STUDENT IN AN ORGANIZED HEALTH CARE EDUCATION/TRAINING PROGRAM

## 2022-04-21 PROCEDURE — 99396 PREV VISIT EST AGE 40-64: CPT | Performed by: STUDENT IN AN ORGANIZED HEALTH CARE EDUCATION/TRAINING PROGRAM

## 2022-04-21 PROCEDURE — 82570 ASSAY OF URINE CREATININE: CPT | Performed by: STUDENT IN AN ORGANIZED HEALTH CARE EDUCATION/TRAINING PROGRAM

## 2022-04-21 PROCEDURE — 80061 LIPID PANEL: CPT | Performed by: STUDENT IN AN ORGANIZED HEALTH CARE EDUCATION/TRAINING PROGRAM

## 2022-04-21 PROCEDURE — 83036 HEMOGLOBIN GLYCOSYLATED A1C: CPT | Performed by: STUDENT IN AN ORGANIZED HEALTH CARE EDUCATION/TRAINING PROGRAM

## 2022-04-21 RX ORDER — HYDRALAZINE HYDROCHLORIDE 100 MG/1
100 TABLET, FILM COATED ORAL 3 TIMES DAILY
Qty: 270 TABLET | Refills: 2 | Status: SHIPPED | OUTPATIENT
Start: 2022-04-21 | End: 2023-01-09

## 2022-04-21 RX ORDER — ATORVASTATIN CALCIUM 20 MG/1
20 TABLET, FILM COATED ORAL DAILY
Qty: 90 TABLET | Refills: 2 | Status: SHIPPED | OUTPATIENT
Start: 2022-04-21 | End: 2023-01-09

## 2022-04-21 RX ORDER — FUROSEMIDE 40 MG/1
40 TABLET ORAL DAILY
Qty: 90 TABLET | Refills: 2 | Status: SHIPPED | OUTPATIENT
Start: 2022-04-21 | End: 2023-01-09

## 2022-04-21 RX ORDER — CLONIDINE HYDROCHLORIDE 0.1 MG/1
0.1 TABLET ORAL 2 TIMES DAILY
Qty: 180 TABLET | Refills: 2 | Status: SHIPPED | OUTPATIENT
Start: 2022-04-21 | End: 2023-01-09

## 2022-04-21 RX ORDER — CITALOPRAM 40 MG/1
40 TABLET ORAL DAILY
Qty: 90 TABLET | Refills: 2 | Status: SHIPPED | OUTPATIENT
Start: 2022-04-21 | End: 2023-01-09

## 2022-04-21 RX ORDER — INSULIN DEGLUDEC INJECTION 100 U/ML
10 INJECTION, SOLUTION SUBCUTANEOUS DAILY
Qty: 3 ML | Refills: 3 | Status: SHIPPED | OUTPATIENT
Start: 2022-04-21 | End: 2022-04-22 | Stop reason: SDUPTHER

## 2022-04-21 SDOH — ECONOMIC STABILITY - FOOD INSECURITY: FOOD INSECURITY: Z59.41

## 2022-04-21 NOTE — PATIENT INSTRUCTIONS
Cooking With Less Salt  Cooking with less salt is one way to reduce the amount of sodium you get from food. Sodium is one of the elements that make up salt. It is found naturally in foods and is also added to certain foods. Depending on your condition and overall health, your health care provider or dietitian may recommend that you reduce your sodium intake. Most people should have less than 2,300 milligrams (mg) of sodium each day. If you have high blood pressure (hypertension), you may need to limit your sodium to 1,500 mg each day. Follow the tips below to help reduce your sodium intake.  What are tips for eating less sodium?  Reading food labels       Check the food label before buying or using packaged ingredients. Always check the label for the serving size and sodium content.  Look for products with no more than 140 mg of sodium in one serving.  Check the % Daily Value column to see what percent of the daily recommended amount of sodium is provided in one serving of the product. Foods with 5% or less in this column are considered low in sodium. Foods with 20% or higher are considered high in sodium.  Do not choose foods with salt as one of the first three ingredients on the ingredients list. If salt is one of the first three ingredients, it usually means the item is high in sodium.     Shopping  Buy sodium-free or low-sodium products. Look for the following words on food labels:  Low-sodium.  Sodium-free.  Reduced-sodium.  No salt added.  Unsalted.  Always check the sodium content even if foods are labeled as low-sodium or no salt added.  Buy fresh foods.  Cooking  Use herbs, seasonings without salt, and spices as substitutes for salt.  Use sodium-free baking soda when baking.  [place], braise, or roast foods to add flavor with less salt.  Avoid adding salt to pasta, rice, or hot cereals.  Drain and rinse canned vegetables, beans, and meat before use.  Avoid adding salt when cooking sweets and desserts.  Cook  "with low-sodium ingredients.  What foods are high in sodium?  Vegetables  Regular canned vegetables (not low-sodium or reduced-sodium). Sauerkraut, pickled vegetables, and relishes. Olives. French fries. Onion rings. Regular canned tomato sauce and paste. Regular tomato and vegetable juice. Frozen vegetables in sauces.  Grains  Instant hot cereals. Bread stuffing, pancake, and biscuit mixes. Croutons. Seasoned rice or pasta mixes. Noodle soup cups. Boxed or frozen macaroni and cheese. Regular salted crackers. Self-rising flour. Rolls. Bagels. Flour tortillas and wraps.  Meats and other proteins  Meat or fish that is salted, canned, smoked, cured, spiced, or pickled. This includes sanchez, ham, sausages, hot dogs, corned beef, chipped beef, meat loaves, salt pork, jerky, pickled herring, anchovies, regular canned tuna, and sardines. Salted nuts.  Dairy  Processed cheese and cheese spreads. Cheese curds. Blue cheese. Feta cheese. String cheese. Regular cottage cheese. Buttermilk. Canned milk.  The items listed above may not be a complete list of foods high in sodium. Actual amounts of sodium may be different depending on processing. Contact a dietitian for more information.  What foods are low in sodium?  Fruits  Fresh, frozen, or canned fruit with no sauce added. Fruit juice.  Vegetables  Fresh or frozen vegetables with no sauce added. \"No salt added\" canned vegetables. \"No salt added\" tomato sauce and paste. Low-sodium or reduced-sodium tomato and vegetable juice.  Grains  Noodles, pasta, quinoa, rice. Shredded or puffed wheat or puffed rice. Regular or quick oats (not instant). Low-sodium crackers. Low-sodium bread. Whole-grain bread and whole-grain pasta. Unsalted popcorn.  Meats and other proteins  Fresh or frozen whole meats, poultry (not injected with sodium), and fish with no sauce added. Unsalted nuts. Dried peas, beans, and lentils without added salt. Unsalted canned beans. Eggs. Unsalted nut butters. " Low-sodium canned tuna or chicken.  Dairy  Milk. Soy milk. Yogurt. Low-sodium cheeses, such as Swiss, [place] Igor, mozzarella, and ricotta. Sherbet or ice cream (keep to ½ cup per serving). Cream cheese.  Fats and oils  Unsalted butter or margarine.  Other foods  Homemade pudding. Sodium-free baking soda and baking powder. Herbs and spices. Low-sodium seasoning mixes.  Beverages  Coffee and tea. Carbonated beverages.  The items listed above may not be a complete list of foods low in sodium. Actual amounts of sodium may be different depending on processing. Contact a dietitian for more information.  What are some salt alternatives when cooking?  The following are herbs, seasonings, and spices that can be used instead of salt to flavor your food. Herbs should be fresh or dried. Do not choose packaged mixes. Next to the name of the herb, spice, or seasoning are some examples of foods you can pair it with.  Herbs  Bay leaves - Soups, meat and vegetable dishes, and spaghetti sauce.  Basil - Italian dishes, soups, pasta, and fish dishes.  Cilantro - Meat, poultry, and vegetable dishes.  Chili powder - Marinades and Mexican dishes.  Chives - Salad dressings and potato dishes.  Cumin - Mexican dishes, couscous, and meat dishes.  Dill - Fish dishes, sauces, and salads.  Fennel - Meat and vegetable dishes, breads, and cookies.  Garlic (do not use garlic salt) - Italian dishes, meat dishes, salad dressings, and sauces.  Marjoram - Soups, potato dishes, and meat dishes.  Oregano - Pizza and spaghetti sauce.  Parsley - Salads, soups, pasta, and meat dishes.  Cheri - Italian dishes, salad dressings, soups, and red meats.  Saffron - Fish dishes, pasta, and some poultry dishes.  Mateo - Stuffings and sauces.  Tarragon - Fish and poultry dishes.  Thyme - Stuffing, meat, and fish dishes.  Seasonings  Lemon juice - Fish dishes, poultry dishes, vegetables, and salads.  Vinegar - Salad dressings, vegetables, and fish  "dishes.  Spices  Cinnamon - Sweet dishes, such as cakes, cookies, and puddings.  Cloves - Gingerbread, puddings, and marinades for meats.  Martin - Vegetable dishes, fish and poultry dishes, and stir-zhang dishes.  Lavonne - Vegetable dishes, fish dishes, and stir-zhang dishes.  Nutmeg - Pasta, vegetables, poultry, fish dishes, and custard.  Summary  Cooking with less salt is one way to reduce the amount of sodium that you get from food.  Buy sodium-free or low-sodium products.  Check the food label before using or buying packaged ingredients.  Use herbs, seasonings without salt, and spices as substitutes for salt in foods.  This information is not intended to replace advice given to you by your health care provider. Make sure you discuss any questions you have with your health care provider.  Document Revised: 12/09/2020 Document Reviewed: 12/09/2020  [place] Patient Education © 2021 [employer]      https://www.nhlbi.nih.gov/files/docs/public/heart/dash_brief.pdf\">   DASH Eating Plan  DASH stands for Dietary Approaches to Stop Hypertension. The DASH eating plan is a healthy eating plan that has been shown to:  Reduce high blood pressure (hypertension).  Reduce your risk for type 2 diabetes, heart disease, and stroke.  Help with weight loss.  What are tips for following this plan?  Reading food labels  Check food labels for the amount of salt (sodium) per serving. Choose foods with less than 5 percent of the Daily Value of sodium. Generally, foods with less than 300 milligrams (mg) of sodium per serving fit into this eating plan.  To find whole grains, look for the word \"whole\" as the first word in the ingredient list.  Shopping  Buy products labeled as \"low-sodium\" or \"no salt added.\"  Buy fresh foods. Avoid canned foods and pre-made or frozen meals.  Cooking  Avoid adding salt when cooking. Use salt-free seasonings or herbs instead of table salt or sea salt. Check with your health care provider or pharmacist before " using salt substitutes.  Do not zhang foods. Cook foods using healthy methods such as baking, boiling, grilling, roasting, and broiling instead.  Cook with heart-healthy oils, such as olive, canola, avocado, soybean, or sunflower oil.  Meal planning       Eat a balanced diet that includes:  4 or more servings of fruits and 4 or more servings of vegetables each day. Try to fill one-half of your plate with fruits and vegetables.  6-8 servings of whole grains each day.  Less than 6 oz (170 g) of lean meat, poultry, or fish each day. A 3-oz (85-g) serving of meat is about the same size as a deck of cards. One egg equals 1 oz (28 g).  2-3 servings of low-fat dairy each day. One serving is 1 cup (237 mL).  1 serving of nuts, seeds, or beans 5 times each week.  2-3 servings of heart-healthy fats. Healthy fats called omega-3 fatty acids are found in foods such as walnuts, flaxseeds, fortified milks, and eggs. These fats are also found in cold-water fish, such as sardines, salmon, and mackerel.  Limit how much you eat of:  Canned or prepackaged foods.  Food that is high in trans fat, such as some fried foods.  Food that is high in saturated fat, such as fatty meat.  Desserts and other sweets, sugary drinks, and other foods with added sugar.  Full-fat dairy products.  Do not salt foods before eating.  Do not eat more than 4 egg yolks a week.  Try to eat at least 2 vegetarian meals a week.  Eat more home-cooked food and less restaurant, buffet, and fast food.     Lifestyle  When eating at a restaurant, ask that your food be prepared with less salt or no salt, if possible.  If you drink alcohol:  Limit how much you use to:  0-1 drink a day for women who are not pregnant.  0-2 drinks a day for men.  Be aware of how much alcohol is in your drink. In the [place] one drink equals one 12 oz bottle of beer (355 mL), one 5 oz glass of wine (148 mL), or one 1½ oz glass of hard liquor (44 mL).  General information  Avoid eating more than  2,300 mg of salt a day. If you have hypertension, you may need to reduce your sodium intake to 1,500 mg a day.  Work with your health care provider to maintain a healthy body weight or to lose weight. Ask what an ideal weight is for you.  Get at least 30 minutes of exercise that causes your heart to beat faster (aerobic exercise) most days of the week. Activities may include walking, swimming, or biking.  Work with your health care provider or dietitian to adjust your eating plan to your individual calorie needs.  What foods should I eat?  Fruits  All fresh, dried, or frozen fruit. Canned fruit in natural juice (without added sugar).  Vegetables  Fresh or frozen vegetables (raw, steamed, roasted, or grilled). Low-sodium or reduced-sodium tomato and vegetable juice. Low-sodium or reduced-sodium tomato sauce and tomato paste. Low-sodium or reduced-sodium canned vegetables.  Grains  Whole-grain or whole-wheat bread. Whole-grain or whole-wheat pasta. Brown rice. Oatmeal. Quinoa. Bulgur. Whole-grain and low-sodium cereals. [place]ia bread. Low-fat, low-sodium crackers. Whole-wheat flour tortillas.  Meats and other proteins  Skinless chicken or turkey. Ground chicken or turkey. Pork with fat trimmed off. Fish and seafood. Egg whites. Dried beans, peas, or lentils. Unsalted nuts, nut butters, and seeds. Unsalted canned beans. Lean cuts of beef with fat trimmed off. Low-sodium, lean precooked or cured meat, such as sausages or meat loaves.  Dairy  Low-fat (1%) or fat-free (skim) milk. Reduced-fat, low-fat, or fat-free cheeses. Nonfat, low-sodium ricotta or cottage cheese. Low-fat or nonfat yogurt. Low-fat, low-sodium cheese.  Fats and oils  Soft margarine without trans fats. Vegetable oil. Reduced-fat, low-fat, or light mayonnaise and salad dressings (reduced-sodium). Canola, safflower, olive, avocado, soybean, and sunflower oils. Avocado.  Seasonings and condiments  Herbs. Spices. Seasoning mixes without salt.  Other  foods  Unsalted popcorn and pretzels. Fat-free sweets.  The items listed above may not be a complete list of foods and beverages you can eat. Contact a dietitian for more information.  What foods should I avoid?  Fruits  Canned fruit in a light or heavy syrup. Fried fruit. Fruit in cream or butter sauce.  Vegetables  Creamed or fried vegetables. Vegetables in a cheese sauce. Regular canned vegetables (not low-sodium or reduced-sodium). Regular canned tomato sauce and paste (not low-sodium or reduced-sodium). Regular tomato and vegetable juice (not low-sodium or reduced-sodium). Pickles. Olives.  Grains  Baked goods made with fat, such as croissants, muffins, or some breads. Dry pasta or rice meal packs.  Meats and other proteins  Fatty cuts of meat. Ribs. Fried meat. [place]. Bologna, salami, and other precooked or cured meats, such as sausages or meat loaves. Fat from the back of a pig (fatback). Bratwurst. Salted nuts and seeds. Canned beans with added salt. Canned or smoked fish. Whole eggs or egg yolks. Chicken or turkey with skin.  Dairy  Whole or 2% milk, cream, and half-and-half. Whole or full-fat cream cheese. Whole-fat or sweetened yogurt. Full-fat cheese. Nondairy creamers. Whipped toppings. Processed cheese and cheese spreads.  Fats and oils  Butter. Stick margarine. Lard. Shortening. Ghee. [place] fat. Tropical oils, such as coconut, palm kernel, or palm oil.  Seasonings and condiments  Onion salt, garlic salt, seasoned salt, table salt, and sea salt. Worcestershire sauce. Tartar sauce. Barbecue sauce. Teriyaki sauce. Soy sauce, including reduced-sodium. Steak sauce. Canned and packaged gravies. Fish sauce. Oyster sauce. Cocktail sauce. Store-bought horseradish. Ketchup. Mustard. Meat flavorings and tenderizers. Bouillon cubes. Hot sauces. Pre-made or packaged marinades. Pre-made or packaged taco seasonings. Relishes. Regular salad dressings.  Other foods  Salted popcorn and pretzels.  The items listed above  may not be a complete list of foods and beverages you should avoid. Contact a dietitian for more information.  Where to find more information  National Heart, Lung, and Blood [place]: www.nhlbi.nih.gov  American Heart Association: www.heart.org  Academy of Nutrition and Dietetics: www.eatright.org  National Kidney Foundation: www.kidney.org  Summary  The DASH eating plan is a healthy eating plan that has been shown to reduce high blood pressure (hypertension). It may also reduce your risk for type 2 diabetes, heart disease, and stroke.  When on the DASH eating plan, aim to eat more fresh fruits and vegetables, whole grains, lean proteins, low-fat dairy, and heart-healthy fats.  With the DASH eating plan, you should limit salt (sodium) intake to 2,300 mg a day. If you have hypertension, you may need to reduce your sodium intake to 1,500 mg a day.  Work with your health care provider or dietitian to adjust your eating plan to your individual calorie needs.  This information is not intended to replace advice given to you by your health care provider. Make sure you discuss any questions you have with your health care provider.  Document Revised: 11/20/2020 Document Reviewed: 11/20/2020  [employer] Patient Education © 2021 [employer].       Heart-Healthy Eating Plan  Heart-healthy meal planning includes:  Eating less unhealthy fats.  Eating more healthy fats.  Making other changes in your diet.  Talk with your doctor or a diet specialist (dietitian) to create an eating plan that is right for you.  What is my plan?  Your doctor may recommend an eating plan that includes:  Total fat: ______% or less of total calories a day.  Saturated fat: ______% or less of total calories a day.  Cholesterol: less than _________mg a day.  What are tips for following this plan?  Cooking  Avoid frying your food. Try to bake, boil, grill, or broil it instead. You can also reduce fat by:  Removing the skin from poultry.  Removing all  visible fats from meats.  Steaming vegetables in water or broth.  Meal planning       At meals, divide your plate into four equal parts:  Fill one-half of your plate with vegetables and green salads.  Fill one-fourth of your plate with whole grains.  Fill one-fourth of your plate with lean protein foods.  Eat 4-5 servings of vegetables per day. A serving of vegetables is:  1 cup of raw or cooked vegetables.  2 cups of raw leafy greens.  Eat 4-5 servings of fruit per day. A serving of fruit is:  1 medium whole fruit.  ¼ cup of dried fruit.  ½ cup of fresh, frozen, or canned fruit.  ½ cup of 100% fruit juice.  Eat more foods that have soluble fiber. These are apples, broccoli, carrots, beans, peas, and barley. Try to get 20-30 g of fiber per day.  Eat 4-5 servings of nuts, legumes, and seeds per week:  1 serving of dried beans or legumes equals ½ cup after being cooked.  1 serving of nuts is ¼ cup.  1 serving of seeds equals 1 tablespoon.     General information  Eat more home-cooked food. Eat less restaurant, buffet, and fast food.  Limit or avoid alcohol.  Limit foods that are high in starch and sugar.  Avoid fried foods.  Lose weight if you are overweight.  Keep track of how much salt (sodium) you eat. This is important if you have high blood pressure. Ask your doctor to tell you more about this.  Try to add vegetarian meals each week.  Fats  Choose healthy fats. These include olive oil and canola oil, flaxseeds, walnuts, almonds, and seeds.  Eat more omega-3 fats. These include salmon, mackerel, sardines, tuna, flaxseed oil, and ground flaxseeds. Try to eat fish at least 2 times each week.  Check food labels. Avoid foods with trans fats or high amounts of saturated fat.  Limit saturated fats.  These are often found in animal products, such as meats, butter, and cream.  These are also found in plant foods, such as palm oil, palm kernel oil, and coconut oil.  Avoid foods with partially hydrogenated oils in them.  These have trans fats. Examples are stick margarine, some tub margarines, cookies, crackers, and other baked goods.  What foods can I eat?  Fruits  All fresh, canned (in natural juice), or frozen fruits.  Vegetables  Fresh or frozen vegetables (raw, steamed, roasted, or grilled). Green salads.  Grains  Most grains. Choose whole wheat and whole grains most of the time. Rice and pasta, including brown rice and pastas made with whole wheat.  Meats and other proteins  Lean, well-trimmed beef, veal, pork, and lamb. Chicken and turkey without skin. All fish and shellfish. Wild duck, rabbit, pheasant, and venison. Egg whites or low-cholesterol egg substitutes. Dried beans, peas, lentils, and tofu. Seeds and most nuts.  Dairy  Low-fat or nonfat cheeses, including ricotta and mozzarella. Skim or 1% milk that is liquid, powdered, or evaporated. Buttermilk that is made with low-fat milk. Nonfat or low-fat yogurt.  Fats and oils  Non-hydrogenated (trans-free) margarines. Vegetable oils, including soybean, sesame, sunflower, olive, peanut, safflower, corn, canola, and cottonseed. Salad dressings or mayonnaise made with a vegetable oil.  Beverages  Mineral water. Coffee and tea. Diet carbonated beverages.  Sweets and desserts  Sherbet, gelatin, and fruit ice. Small amounts of dark chocolate.  Limit all sweets and desserts.  Seasonings and condiments  All seasonings and condiments.  The items listed above may not be a complete list of foods and drinks you can eat. Contact a dietitian for more options.  What foods should I avoid?  Fruits  Canned fruit in heavy syrup. Fruit in cream or butter sauce. Fried fruit. Limit coconut.  Vegetables  Vegetables cooked in cheese, cream, or butter sauce. Fried vegetables.  Grains  Breads that are made with saturated or trans fats, oils, or whole milk. Croissants. Sweet rolls. Donuts. High-fat crackers, such as cheese crackers.  Meats and other proteins  Fatty meats, such as hot dogs, ribs,  sausage, sanchez, rib-eye roast or steak. High-fat deli meats, such as salami and bologna. Caviar. Domestic duck and goose. Organ meats, such as liver.  Dairy  Cream, sour cream, cream cheese, and creamed cottage cheese. Whole-milk cheeses. Whole or 2% milk that is liquid, evaporated, or condensed. Whole buttermilk. Cream sauce or high-fat cheese sauce. Yogurt that is made from whole milk.  Fats and oils  Meat fat, or shortening. Cocoa butter, hydrogenated oils, palm oil, coconut oil, palm kernel oil. Solid fats and shortenings, including sanchez fat, salt pork, lard, and butter. Nondairy cream substitutes. Salad dressings with cheese or sour cream.  Beverages  Regular sodas and juice drinks with added sugar.  Sweets and desserts  Frosting. Pudding. Cookies. Cakes. Pies. Milk chocolate or white chocolate. Buttered syrups. Full-fat ice cream or ice cream drinks.  The items listed above may not be a complete list of foods and drinks to avoid. Contact a dietitian for more information.  Summary  Heart-healthy meal planning includes eating less unhealthy fats, eating more healthy fats, and making other changes in your diet.  Eat a balanced diet. This includes fruits and vegetables, low-fat or nonfat dairy, lean protein, nuts and legumes, whole grains, and heart-healthy oils and fats.  This information is not intended to replace advice given to you by your health care provider. Make sure you discuss any questions you have with your health care provider.  Document Revised: 02/21/2019 Document Reviewed: 01/25/2019  Elsevier Patient Education © 2021 [employer].       Mediterranean Diet  A Mediterranean diet refers to food and lifestyle choices that are based on the traditions of countries located on the [place]. This way of eating has been shown to help prevent certain conditions and improve outcomes for people who have chronic diseases, like kidney disease and heart disease.  What are tips for following this  plan?  Lifestyle  Cook and eat meals together with your family, when possible.  Drink enough fluid to keep your urine clear or pale yellow.  Be physically active every day. This includes:  Aerobic exercise like running or swimming.  Leisure activities like gardening, walking, or housework.  Get 7-8 hours of sleep each night.  If recommended by your health care provider, drink red wine in moderation. This means 1 glass a day for nonpregnant women and 2 glasses a day for men. A glass of wine equals 5 oz (150 mL).  Reading food labels       Check the serving size of packaged foods. For foods such as rice and pasta, the serving size refers to the amount of cooked product, not dry.  Check the total fat in packaged foods. Avoid foods that have saturated fat or trans fats.  Check the ingredients list for added sugars, such as corn syrup.     Shopping  At the grocery store, buy most of your food from the areas near the walls of the store. This includes:  Fresh fruits and vegetables (produce).  Grains, beans, nuts, and seeds. Some of these may be available in unpackaged forms or large amounts (in bulk).  Fresh seafood.  Poultry and eggs.  Low-fat dairy products.  Buy whole ingredients instead of prepackaged foods.  Buy fresh fruits and vegetables in-season from local farmers markets.  Buy frozen fruits and vegetables in resealable bags.  If you do not have access to quality fresh seafood, buy precooked frozen shrimp or canned fish, such as tuna, salmon, or sardines.  Buy small amounts of raw or cooked vegetables, salads, or olives from the deli or salad bar at your store.  Stock your pantry so you always have certain foods on hand, such as olive oil, canned tuna, canned tomatoes, rice, pasta, and beans.  Cooking  Cook foods with extra-virgin olive oil instead of using butter or other vegetable oils.  Have meat as a side dish, and have vegetables or grains as your main dish. This means having meat in small portions or adding  small amounts of meat to foods like pasta or stew.  Use beans or vegetables instead of meat in common dishes like chili or lasagna.  [place] with different cooking methods. Try roasting or broiling vegetables instead of steaming or sautéeing them.  Add frozen vegetables to soups, stews, pasta, or rice.  Add nuts or seeds for added healthy fat at each meal. You can add these to yogurt, salads, or vegetable dishes.  Marinate fish or vegetables using olive oil, lemon juice, garlic, and fresh herbs.  Meal planning       Plan to eat 1 vegetarian meal one day each week. Try to work up to 2 vegetarian meals, if possible.  Eat seafood 2 or more times a week.  Have healthy snacks readily available, such as:  Vegetable sticks with hummus.  Greek yogurt.  Fruit and nut trail mix.  Eat balanced meals throughout the week. This includes:  Fruit: 2-3 servings a day  Vegetables: 4-5 servings a day  Low-fat dairy: 2 servings a day  Fish, poultry, or lean meat: 1 serving a day  Beans and legumes: 2 or more servings a week  Nuts and seeds: 1-2 servings a day  Whole grains: 6-8 servings a day  Extra-virgin olive oil: 3-4 servings a day  Limit red meat and sweets to only a few servings a month     What are my food choices?  Mediterranean diet  Recommended  Grains: Whole-grain pasta. Brown rice. Bulgar wheat. Polenta. Couscous. Whole-wheat bread. Oatmeal. Quinoa.  Vegetables: Artichokes. Beets. Broccoli. Cabbage. Carrots. Eggplant. Green beans. Chard. Kale. Spinach. Onions. Leeks. Peas. Squash. Tomatoes. Peppers. Radishes.  Fruits: Apples. Apricots. Avocado. Berries. Bananas. Cherries. Dates. Figs. Grapes. [place]. Melon. Oranges. Peaches. Plums. Pomegranate.  Meats and other protein foods: Beans. Almonds. Sunflower seeds. Pine nuts. Peanuts. Cod. [place]. Scallops. Shrimp. Tuna. Tilapia. Clams. Oysters. Eggs.  Dairy: Low-fat milk. Cheese. Greek yogurt.  Beverages: Water. Red wine. Herbal tea.  Fats and oils: Extra virgin olive oil.  Avocado oil. Grape seed oil.  Sweets and desserts: Greek yogurt with honey. Baked apples. Poached pears. Trail mix.  Seasoning and other foods: Basil. Cilantro. Coriander. Cumin. Mint. Parsley. [place]. Rosemary. Tarragon. Garlic. Oregano. Thyme. Pepper. Balsalmic vinegar. Tahini. Hummus. Tomato sauce. Olives. Mushrooms.  Limit these  Grains: Prepackaged pasta or rice dishes. Prepackaged cereal with added sugar.  Vegetables: Deep fried potatoes (french fries).  Fruits: Fruit canned in syrup.  Meats and other protein foods: Beef. Pork. Lamb. Poultry with skin. Hot dogs. [place].  Dairy: Ice cream. Sour cream. Whole milk.  Beverages: Juice. Sugar-sweetened soft drinks. Beer. Liquor and spirits.  Fats and oils: Butter. Canola oil. Vegetable oil. Beef fat (tallow). Lard.  Sweets and desserts: Cookies. Cakes. Pies. Candy.  Seasoning and other foods: Mayonnaise. Premade sauces and marinades.  The items listed may not be a complete list. Talk with your dietitian about what dietary choices are right for you.  Summary  The Mediterranean diet includes both food and lifestyle choices.  Eat a variety of fresh fruits and vegetables, beans, nuts, seeds, and whole grains.  Limit the amount of red meat and sweets that you eat.  Talk with your health care provider about whether it is safe for you to drink red wine in moderation. This means 1 glass a day for nonpregnant women and 2 glasses a day for men. A glass of wine equals 5 oz (150 mL).  This information is not intended to replace advice given to you by your health care provider. Make sure you discuss any questions you have with your health care provider.  Document Revised: 08/17/2017 Document Reviewed: 08/10/2017  Elsevier Patient Education © 2020 Elsevier Inc.         Exercising to Stay Healthy  To become healthy and stay healthy, it is recommended that you do moderate-intensity and vigorous-intensity exercise. You can tell that you are exercising at a moderate intensity if your  heart starts beating faster and you start breathing faster but can still hold a conversation. You can tell that you are exercising at a vigorous intensity if you are breathing much harder and faster and cannot hold a conversation while exercising.  Exercising regularly is important. It has many health benefits, such as:  Improving overall fitness, flexibility, and endurance.  Increasing bone density.  Helping with weight control.  Decreasing body fat.  Increasing muscle strength.  Reducing stress and tension.  Improving overall health.  How often should I exercise?  Choose an activity that you enjoy, and set realistic goals. Your health care provider can help you make an activity plan that works for you.  Exercise regularly as told by your health care provider. This may include:  Doing strength training two times a week, such as:  Lifting weights.  Using resistance bands.  Push-ups.  Sit-ups.  Yoga.  Doing a certain intensity of exercise for a given amount of time. Choose from these options:  A total of 150 minutes of moderate-intensity exercise every week.  A total of 75 minutes of vigorous-intensity exercise every week.  A mix of moderate-intensity and vigorous-intensity exercise every week.  Children, pregnant women, people who have not exercised regularly, people who are overweight, and older adults may need to talk with a health care provider about what activities are safe to do. If you have a medical condition, be sure to talk with your health care provider before you start a new exercise program.  What are some exercise ideas?    Moderate-intensity exercise ideas include:  Walking 1 mile (1.6 km) in about 15 minutes.  Biking.  Hiking.  Golfing.  Dancing.  Water aerobics.  Vigorous-intensity exercise ideas include:  Walking 4.5 miles (7.2 km) or more in about 1 hour.  Jogging or running 5 miles (8 km) in about 1 hour.  Biking 10 miles (16.1 km) or more in about 1 hour.  Lap swimming.  Roller-skating or in-line  skating.  Cross-country skiing.  Vigorous competitive sports, such as football, basketball, and soccer.  Jumping rope.  Aerobic dancing.  What are some everyday activities that can help me to get exercise?  Yard work, such as:  Pushing a .  Raking and bagging leaves.  Washing your car.  Pushing a stroller.  Shoveling snow.  Gardening.  Washing windows or floors.  How can I be more active in my day-to-day activities?  Use stairs instead of an elevator.  Take a walk during your lunch break.  If you drive, park your car farther away from your work or school.  If you take public transportation, get off one stop early and walk the rest of the way.  Stand up or walk around during all of your indoor phone calls.  Get up, stretch, and walk around every 30 minutes throughout the day.  Enjoy exercise with a friend. Support to continue exercising will help you keep a regular routine of activity.  What guidelines can I follow while exercising?  Before you start a new exercise program, talk with your health care provider.  Do not exercise so much that you hurt yourself, feel dizzy, or get very short of breath.  Wear comfortable clothes and wear shoes with good support.  Drink plenty of water while you exercise to prevent dehydration or heat stroke.  Work out until your breathing and your heartbeat get faster.  Where to find more information  [place] Department of Health and Human Services: www.hhs.gov  Centers for Disease Control and Prevention (CDC): www.cdc.gov  Summary  Exercising regularly is important. It will improve your overall fitness, flexibility, and endurance.  Regular exercise also will improve your overall health. It can help you control your weight, reduce stress, and improve your bone density.  Do not exercise so much that you hurt yourself, feel dizzy, or get very short of breath.  Before you start a new exercise program, talk with your health care provider.  This information is not intended to replace  advice given to you by your health care provider. Make sure you discuss any questions you have with your health care provider.  Document Revised: 11/30/2018 Document Reviewed: 11/08/2018  Elsevier Patient Education © 2021 [employer]           Mindfulness-Based Stress Reduction  Mindfulness-based stress reduction (MBSR) is a program that helps people learn to practice mindfulness. Mindfulness is the practice of intentionally paying attention to the present moment. It can be learned and practiced through techniques such as education, breathing exercises, meditation, and yoga. MBSR includes several mindfulness techniques in one program.  MBSR works best when you understand the treatment, are willing to try new things, and can commit to spending time practicing what you learn. MBSR training may include learning about:  How your emotions, thoughts, and reactions affect your body.  New ways to respond to things that cause negative thoughts to start (triggers).  How to notice your thoughts and let go of them.  Practicing awareness of everyday things that you normally do without thinking.  The techniques and goals of different types of meditation.  What are the benefits of MBSR?  MBSR can have many benefits, which include helping you to:  Develop self-awareness. This refers to knowing and understanding yourself.  Learn skills and attitudes that help you to participate in your own health care.  Learn new ways to care for yourself.  Be more accepting about how things are, and let things go.  Be less judgmental and approach things with an open mind.  Be patient with yourself and trust yourself more.  MBSR has also been shown to:  Reduce negative emotions, such as depression and anxiety.  Improve memory and focus.  Change how you sense and approach pain.  Boost your body's ability to fight infections.  Help you connect better with other people.  Improve your sense of well-being.  Follow these instructions at home:       Find  a local in-person or online MBSR program.  Set aside some time regularly for mindfulness practice.  Find a mindfulness practice that works best for you. This may include one or more of the following:  Meditation. Meditation involves focusing your mind on a certain thought or activity.  Breathing awareness exercises. These help you to stay present by focusing on your breath.  Body scan. For this practice, you lie down and pay attention to each part of your body from head to toe. You can identify tension and soreness and intentionally relax parts of your body.  Yoga. Yoga involves stretching and breathing, and it can improve your ability to move and be flexible. It can also provide an experience of testing your body's limits, which can help you release stress.  Mindful eating. This way of eating involves focusing on the taste, texture, color, and smell of each bite of food. Because this slows down eating and helps you feel full sooner, it can be an important part of a weight-loss plan.  Find a podcast or recording that provides guidance for breathing awareness, body scan, or meditation exercises. You can listen to these any time when you have a free moment to rest without distractions.  Follow your treatment plan as told by your health care provider. This may include taking regular medicines and making changes to your diet or lifestyle as recommended.  How to practice mindfulness  To do a basic awareness exercise:  Find a comfortable place to sit.  Pay attention to the present moment. Observe your thoughts, feelings, and surroundings just as they are.  Avoid placing judgment on yourself, your feelings, or your surroundings. Make note of any judgment that comes up, and let it go.  Your mind may wander, and that is okay. Make note of when your thoughts drift, and return your attention to the present moment.  To do basic mindfulness meditation:  Find a comfortable place to sit. This may include a stable chair or a firm  floor cushion.  Sit upright with your back straight. Let your arms fall next to your side with your hands resting on your legs.  If sitting in a chair, rest your feet flat on the floor.  If sitting on a cushion, cross your legs in front of you.  Keep your head in a neutral position with your chin dropped slightly. Relax your jaw and rest the tip of your tongue on the roof of your mouth. Drop your gaze to the floor. You can close your eyes if you like.  Breathe normally and pay attention to your breath. Feel the air moving in and out of your nose. Feel your belly expanding and relaxing with each breath.  Your mind may wander, and that is okay. Make note of when your thoughts drift, and return your attention to your breath.  Avoid placing judgment on yourself, your feelings, or your surroundings. Make note of any judgment or feelings that come up, let them go, and bring your attention back to your breath.  When you are ready, lift your gaze or open your eyes. Pay attention to how your body feels after the meditation.  Where to find more information  You can find more information about MBSR from:  Your health care provider.  Community-based meditation centers or programs.  Programs offered near you.  Summary  Mindfulness-based stress reduction (MBSR) is a program that teaches you how to intentionally pay attention to the present moment. It is used with other treatments to help you cope better with daily stress, emotions, and pain.  MBSR focuses on developing self-awareness, which allows you to respond to life stress without judgment or negative emotions.  MBSR programs may involve learning different mindfulness practices, such as breathing exercises, meditation, yoga, body scan, or mindful eating. Find a mindfulness practice that works best for you, and set aside time for it on a regular basis.  This information is not intended to replace advice given to you by your health care provider. Make sure you discuss any  questions you have with your health care provider.  Document Revised: 02/22/2021 Document Reviewed: 04/26/2018  Elsevier Patient Education © 2021 Elsevier Inc.

## 2022-04-21 NOTE — PROGRESS NOTES
Chief Complaint  Annual Exam    Subjective          Rogelio Dee presents to [facility]  History of Present Illness    Mood has improved.  Mr. Lepe apologetic today towards me as well as staff over behavior last visit when he was feeling suicidal.  He denies SI at this visit today.    Reports that his controlled substance prescriptions from pain management have been stolen by his ex-girlfriend, who continues to live with him (opiate and flexeril).  He reports that pain management is aware of this.  He reports he has been locking up his controlled substances for about 2 months, and that the last time she stole any of these controlleds is about a month ago.    He reports he has not contacted police as she has threatened to accuse him of selling controlled substances.  I did  him today and advised him to contact police.    Smoking 10 cigarettes a day.  Previously smoked 1-1.5 PPD, and started age 15.    Last saw nephrology about 6 months ago.    He is reliant on food genao, and is not adherent to a low sodium diet.  He engages in some exercise, primarily walking (e.g., in his back yard).    He reports his blood sugar was crashing on 25U of tresiba, and now taking 10U daily plus 8U TID with meals of a short acting insulin (he is uncertain of which short acting).  He only takes his tresiba if his night time blood sugar is in the 130s or higher.  B.S. in the last week is running between 120 and 135 per his report.      Current Outpatient Medications   Medication Instructions   • Admelog 100 UNIT/ML injection ADMINISTER 15 UNITS UNDER THE SKIN BEFORE A MEAL AS NEEDED PER SLIDING SCALE   • amLODIPine (NORVASC) 10 MG tablet amlodipine 10 mg tablet   • aspirin 81 mg, Oral, Daily   • atorvastatin (LIPITOR) 20 mg, Oral, Daily   • carvedilol (COREG) 25 mg, Oral, 2 Times Daily With Meals   • Cholecalciferol 2,000 Units, Oral, Daily   • citalopram (CELEXA) 40 mg, Oral,  "Daily   • cloNIDine (CATAPRES) 0.1 mg, Oral, 2 Times Daily   • cyclobenzaprine (FLEXERIL) 10 MG tablet Every 8 Hours Scheduled   • dorzolamide-timolol (COSOPT) 22.3-6.8 MG/ML ophthalmic solution 1 drop, Both Eyes, 2 Times Daily   • ferrous sulfate 325 (65 FE) MG tablet FeroSul 325 mg (65 mg iron) tablet   TAKE 1 TABLET BY MOUTH TWICE DAILY   • furosemide (LASIX) 40 mg, Oral, Daily   • hydrALAZINE (APRESOLINE) 100 mg, Oral, 3 Times Daily   • HYDROcodone-acetaminophen (NORCO) 7.5-325 MG per tablet Every 6 Hours   • lisinopril (PRINIVIL,ZESTRIL) 20 MG tablet lisinopril 20 mg tablet   TAKE 1 TABLET BY MOUTH DAILY   • NIFEdipine XL (PROCARDIA XL) 60 mg, Oral, Daily   • rOPINIRole (REQUIP) 0.25 mg, Oral, Nightly, Take 1 hour before bedtime.   • sodium bicarbonate 650 MG tablet sodium bicarbonate 650 mg tablet   • sodium polystyrene (KAYEXALATE) 15 GM/60ML suspension SPS (with sorbitol) 15 gram-20 gram/60 mL oral suspension   • tamsulosin (FLOMAX) 0.4 mg, Oral, Daily   • Tresiba FlexTouch 10 Units, Subcutaneous, Daily       The following portions of the patient's history were reviewed and updated as appropriate: allergies, current medications, past family history, past medical history, past social history, past surgical history, and problem list.    Objective   Vital Signs:   /85   Pulse 73   Temp 97.7 °F (36.5 °C) (Temporal)   Ht 172.7 cm (68\")   Wt 78.4 kg (172 lb 12.8 oz)   SpO2 98%   BMI 26.27 kg/m²     Wt Readings from Last 3 Encounters:   04/21/22 78.4 kg (172 lb 12.8 oz)   03/14/22 77.6 kg (171 lb)   12/14/21 79.4 kg (175 lb)     BP Readings from Last 3 Encounters:   04/21/22 142/85   03/14/22 166/71   12/14/21 158/82     Physical Exam  Vitals reviewed.   Constitutional:       Appearance: Normal appearance.   HENT:      Head: Normocephalic and atraumatic.      Nose: Nose normal.   Eyes:      Conjunctiva/sclera: Conjunctivae normal.   Cardiovascular:      Rate and Rhythm: Normal rate and regular rhythm. "      Pulses: Normal pulses.      Heart sounds: Normal heart sounds. No murmur heard.  Pulmonary:      Effort: Pulmonary effort is normal.      Breath sounds: Normal breath sounds. No stridor. No wheezing.   Abdominal:      General: Abdomen is flat.      Palpations: Abdomen is soft. There is no mass.      Tenderness: There is no abdominal tenderness.   Musculoskeletal:      Comments: Trace LE bilaterally   Skin:     General: Skin is warm and dry.   Neurological:      General: No focal deficit present.      Mental Status: He is alert. Mental status is at baseline.   Psychiatric:         Mood and Affect: Mood normal.         Behavior: Behavior normal.         Thought Content: Thought content normal.         Judgment: Judgment normal.        Result Review :   The following data was reviewed by: Simeon Lomeli MD on 04/21/2022:  Common labs    Common Labsle 7/1/21 7/1/21    1513 1513   Glucose  188 (A)   BUN  46 (A)   Creatinine  2.66 (A)   eGFR Non African Am  26 (A)   Sodium  134 (A)   Potassium  4.1   Chloride  98   Calcium  9.3   Albumin  4.20   WBC 7.94    Hemoglobin 11.8 (A)    Hematocrit 34.7 (A)    Platelets 284    (A) Abnormal value                   Lab Results   Component Value Date    INR 0.91 (L) 09/02/2020    BILIRUBINUR Negative 07/01/2021       Procedures        Assessment and Plan    Diagnoses and all orders for this visit:    1. Annual physical exam (Primary)  -     Comprehensive Metabolic Panel  -     Hemoglobin A1c  -     Lipid Panel  -     TSH  -     Microalbumin / Creatinine Urine Ratio - Urine, Clean Catch  -     CBC & Differential    2. Essential hypertension  -     Comprehensive Metabolic Panel  -     cloNIDine (CATAPRES) 0.1 MG tablet; Take 1 tablet by mouth 2 (Two) Times a Day.  Dispense: 180 tablet; Refill: 2  -     hydrALAZINE (APRESOLINE) 100 MG tablet; Take 1 tablet by mouth 3 (Three) Times a Day.  Dispense: 270 tablet; Refill: 2  -     furosemide (LASIX) 40 MG tablet; Take 1 tablet by  mouth Daily.  Dispense: 90 tablet; Refill: 2    3. Cigarette nicotine dependence without complication  -      CT Chest Low Dose Cancer Screening WO; Future    4. Type 2 diabetes mellitus with both eyes affected by proliferative retinopathy and macular edema, with long-term current use of insulin (HCC)  -     Comprehensive Metabolic Panel  -     Hemoglobin A1c  -     Microalbumin / Creatinine Urine Ratio - Urine, Clean Catch  -     insulin degludec (Tresiba FlexTouch) 100 UNIT/ML solution pen-injector injection; Inject 10 Units under the skin into the appropriate area as directed Daily for 30 days.  Dispense: 3 mL; Refill: 3    5. Stage 4 CKD    6. Chronic diastolic CHF  -     Lipid Panel  -     TSH    7. Screening for thyroid disorder  -     TSH    8. Hyperlipemia, mixed  -     Lipid Panel  -     atorvastatin (LIPITOR) 20 MG tablet; Take 1 tablet by mouth Daily.  Dispense: 90 tablet; Refill: 2    9. Overweight (BMI 25.0-29.9)    10. 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 History of suicidal ideation    12. Screening for colon cancer  -     Cologuard - Stool, Per Rectum; Future    13. Other depression  -     citalopram (CeleXA) 40 MG tablet; Take 1 tablet by mouth Daily.  Dispense: 90 tablet; Refill: 2    14. Food insecurity    15. Chronic pain disorder    Other orders  -     Cholecalciferol 50 MCG (2000 UT) capsule; Take 1 capsule by mouth Daily.  Dispense: 90 each; Refill: 3      T2DM:  -labs today  -asked to contact us with short acting insulin (10U tresiba, 8U TID short acting is his current act home regimen)    HTN, CKD:  -above goal, follows with nephrology  -history of CKD and hyperkalemia on lisinopril  -will request nephro records  -not adherent to low sodium diet as dependent on food genao  -I have asked  to request Nephrology records from Dr. Marietta Hutson    Chronic pain:  -I spoke with Mr. Dee at length about the gravity of someone living with him who is  stealing his controlled substances, and I did advise him to contact the police (which he declines to do)      Health Maintenance:  -nutritional counseling today including low sodium diet as well as the components of a heart healthy diet  -strongly encouraged smoking cessation, but he is not interested in smoking cessation  -exercise counseling, recommending at least 2.5 hours moderate exercise a week      Medications Discontinued During This Encounter   Medication Reason   • Cholecalciferol 50 MCG (2000 UT) capsule Reorder   • atorvastatin (LIPITOR) 20 MG tablet Reorder   • citalopram (CeleXA) 40 MG tablet Reorder   • cloNIDine (CATAPRES) 0.1 MG tablet Reorder   • furosemide (LASIX) 40 MG tablet Reorder   • hydrALAZINE (APRESOLINE) 100 MG tablet Reorder   • insulin degludec (Tresiba FlexTouch) 100 UNIT/ML solution pen-injector injection Reorder          Follow Up   Return in about 3 months (around 7/21/2022) for T2DM.  Patient was given instructions and counseling regarding his condition or for health maintenance advice. Please see specific information pulled into the AVS if appropriate.       Simeon Lomeli MD  04/21/22  12:57 EDT

## 2022-04-22 ENCOUNTER — PATIENT OUTREACH (OUTPATIENT)
Dept: CASE MANAGEMENT | Facility: OTHER | Age: 52
End: 2022-04-22

## 2022-04-22 ENCOUNTER — REFERRAL TRIAGE (OUTPATIENT)
Dept: CASE MANAGEMENT | Facility: OTHER | Age: 52
End: 2022-04-22

## 2022-04-22 ENCOUNTER — TELEPHONE (OUTPATIENT)
Dept: INTERNAL MEDICINE | Facility: CLINIC | Age: 52
End: 2022-04-22

## 2022-04-22 DIAGNOSIS — E11.3513 TYPE 2 DIABETES MELLITUS WITH BOTH EYES AFFECTED BY PROLIFERATIVE RETINOPATHY AND MACULAR EDEMA, WITH LONG-TERM CURRENT USE OF INSULIN: Primary | ICD-10-CM

## 2022-04-22 DIAGNOSIS — E11.3513 TYPE 2 DIABETES MELLITUS WITH BOTH EYES AFFECTED BY PROLIFERATIVE RETINOPATHY AND MACULAR EDEMA, WITH LONG-TERM CURRENT USE OF INSULIN: ICD-10-CM

## 2022-04-22 DIAGNOSIS — E11.69 TYPE 2 DIABETES MELLITUS WITH OTHER SPECIFIED COMPLICATION, UNSPECIFIED WHETHER LONG TERM INSULIN USE: ICD-10-CM

## 2022-04-22 DIAGNOSIS — H54.40 BLINDNESS OF LEFT EYE WITH NORMAL VISION IN CONTRALATERAL EYE: ICD-10-CM

## 2022-04-22 DIAGNOSIS — I50.32 CHRONIC DIASTOLIC (CONGESTIVE) HEART FAILURE: ICD-10-CM

## 2022-04-22 DIAGNOSIS — N18.4 STAGE 4 CHRONIC KIDNEY DISEASE: ICD-10-CM

## 2022-04-22 DIAGNOSIS — Z79.4 TYPE 2 DIABETES MELLITUS WITH BOTH EYES AFFECTED BY PROLIFERATIVE RETINOPATHY AND MACULAR EDEMA, WITH LONG-TERM CURRENT USE OF INSULIN: ICD-10-CM

## 2022-04-22 DIAGNOSIS — Z86.59 HISTORY OF SUICIDAL IDEATION: ICD-10-CM

## 2022-04-22 DIAGNOSIS — Z79.4 TYPE 2 DIABETES MELLITUS WITH BOTH EYES AFFECTED BY PROLIFERATIVE RETINOPATHY AND MACULAR EDEMA, WITH LONG-TERM CURRENT USE OF INSULIN: Primary | ICD-10-CM

## 2022-04-22 DIAGNOSIS — I10 ESSENTIAL HYPERTENSION: ICD-10-CM

## 2022-04-22 DIAGNOSIS — Z59.41 FOOD INSECURITY: ICD-10-CM

## 2022-04-22 DIAGNOSIS — E13.49 OTHER DIABETIC NEUROLOGICAL COMPLICATION ASSOCIATED WITH OTHER SPECIFIED DIABETES MELLITUS: ICD-10-CM

## 2022-04-22 DIAGNOSIS — N18.4 STAGE 4 CHRONIC KIDNEY DISEASE: Primary | ICD-10-CM

## 2022-04-22 DIAGNOSIS — Z86.59 HISTORY OF SUICIDAL IDEATION: Primary | ICD-10-CM

## 2022-04-22 DIAGNOSIS — N18.4 CKD (CHRONIC KIDNEY DISEASE) STAGE 4, GFR 15-29 ML/MIN: ICD-10-CM

## 2022-04-22 DIAGNOSIS — F32.A DEPRESSION, UNSPECIFIED DEPRESSION TYPE: ICD-10-CM

## 2022-04-22 PROCEDURE — 99490 CHRNC CARE MGMT STAFF 1ST 20: CPT | Performed by: STUDENT IN AN ORGANIZED HEALTH CARE EDUCATION/TRAINING PROGRAM

## 2022-04-22 PROCEDURE — 99439 CHRNC CARE MGMT STAF EA ADDL: CPT | Performed by: STUDENT IN AN ORGANIZED HEALTH CARE EDUCATION/TRAINING PROGRAM

## 2022-04-22 RX ORDER — INSULIN LISPRO 100 [IU]/ML
8 INJECTION, SOLUTION INTRAVENOUS; SUBCUTANEOUS
Qty: 10 ML | Refills: 3 | Status: SHIPPED | OUTPATIENT
Start: 2022-04-22

## 2022-04-22 RX ORDER — INSULIN DEGLUDEC INJECTION 100 U/ML
15 INJECTION, SOLUTION SUBCUTANEOUS DAILY
Qty: 3 ML | Refills: 3 | Status: SHIPPED | OUTPATIENT
Start: 2022-04-22 | End: 2022-05-22

## 2022-04-22 SDOH — ECONOMIC STABILITY - FOOD INSECURITY: FOOD INSECURITY: Z59.41

## 2022-04-22 NOTE — TELEPHONE ENCOUNTER
----- Message from Simeon Lomeli MD sent at 4/22/2022  6:51 AM EDT -----  Your hemoglobin A1c is 10.5 (up from 6% last year).  CMP(COMPREHENSIVE METABOLIC PANEL) shows a blood sugar of 339.  Serum sodium, even correcting for your blood sugar, is a little low.  Acidosis noted.    Your kidney function is substantially worse, and this is the most concerning finding of your labs.  Furthermore, your urine labs show you are spilling a large amount of protein in your urine.    I will reach out to your nephrologist, because this is a substantial worsening.  Furthermore, I would like for you to see Ira Larson for assistance in better controlling your diabetes.    TSH(THYROID STIMULATING HORMONE) is within normal lmits.    Lipids notable for elevated triglycerides and low HDL cholesterol.    CBC(COMPLETE BLOOD COUNT) shows mild anemia.

## 2022-04-22 NOTE — PROGRESS NOTES
Your hemoglobin A1c is 10.5 (up from 6% last year).  CMP(COMPREHENSIVE METABOLIC PANEL) shows a blood sugar of 339.  Serum sodium, even correcting for your blood sugar, is a little low.  Acidosis noted.    Your kidney function is substantially worse, and this is the most concerning finding of your labs.  Furthermore, your urine labs show you are spilling a large amount of protein in your urine.    I will reach out to your nephrologist, because this is a substantial worsening.  Furthermore, I would like for you to see Ira Larson for assistance in better controlling your diabetes.    TSH(THYROID STIMULATING HORMONE) is within normal lmits.    Lipids notable for elevated triglycerides and low HDL cholesterol.    CBC(COMPLETE BLOOD COUNT) shows mild anemia.

## 2022-04-22 NOTE — TELEPHONE ENCOUNTER
Spoke with Mr. Lepe over the phone.  Confirmed ID with .    Informed him that I'd like to go up on his tresiba from 10U to 15U.  Reviewed rule of 15 for treatment of hypoglycemia (glc < 70).  He informed me that he is taking 8U TID humalog solostar with meals.    He reports he now has appointment with Dr. Hutson's NP in early May.  I informed him that I am referring him to Ira Larson.

## 2022-04-22 NOTE — OUTREACH NOTE
AMBULATORY CASE MANAGEMENT NOTE    Name and Relationship of Patient/Support Person: Rogelio Dee - Self    Adult Patient Profile  Questions/Answers    Flowsheet Row Most Recent Value   Symptoms/Conditions Managed at Home diabetes, type 2   Barriers to Managing Health financial resources, lack of transportation, social support   Diabetes Management Strategies exercise, other (see comments)  [Diet ]   How Well Do You Speak English? very well   Source of Information patient   Patient Aware of Diagnosis yes   Limitations on Visitors/Phone Calls none   Temporary Family Living Arrangements (While Hospitalized) none needed   Wear Glasses or Blind yes   Vision Management PT blind in one eye    Walking or Climbing Stairs Difficulty yes   Walking or Climbing Stairs ambulation difficulty, requires equipment   Mobility Management PT walks with cane    Equipment Currently Used at Home cane, straight        CCM Interim Update        Contact made with patient per PCP request . I explained the CCM program to him in great detail and shared that if he consented that he could stop at anytime. The patient agreed to consent to CCM. We addressed the patients current illnesses as well as the current needs of the patient . Patient stated that current needs would include but not limited to Availability of Food, transportation , DM Diet , and Possible disability. I shared that the MSW was also on his case and would reach out to help as well and he agreed. During conversion with MSW I explained the patient current needs and she stated that she would reach out and assist with patient. Patient was provided with non emergent phone information . I will follow up with patient soon.       Education Documentation  No documentation found.        JULES HUIZAR  Ambulatory Case Management    4/22/2022, 12:45 EDT

## 2022-04-25 ENCOUNTER — REFERRAL TRIAGE (OUTPATIENT)
Dept: CASE MANAGEMENT | Facility: OTHER | Age: 52
End: 2022-04-25

## 2022-04-25 ENCOUNTER — TELEPHONE (OUTPATIENT)
Dept: INTERNAL MEDICINE | Facility: CLINIC | Age: 52
End: 2022-04-25

## 2022-04-25 NOTE — TELEPHONE ENCOUNTER
PA STARTED: 4/25/2022    COVER MY MEDS KEY: [id number]    WAITING ON INSURANCE REPLY    Message from Plan  The request has been approved. The authorization is effective for a maximum of 12 fills from 04/25/2022 to 04/24/2023, as long as the member is enrolled in their current health plan. A written notification letter will follow with additional details.

## 2022-04-25 NOTE — TELEPHONE ENCOUNTER
PA REQUIRED insulin degludec (Tresiba FlexTouch) 100 UNIT/ML solution pen-injector injection (04/22/2022)

## 2022-04-26 ENCOUNTER — PATIENT OUTREACH (OUTPATIENT)
Dept: CASE MANAGEMENT | Facility: OTHER | Age: 52
End: 2022-04-26

## 2022-04-26 DIAGNOSIS — H54.40 BLINDNESS OF LEFT EYE WITH NORMAL VISION IN CONTRALATERAL EYE: ICD-10-CM

## 2022-04-26 DIAGNOSIS — Z79.4 TYPE 2 DIABETES MELLITUS WITH BOTH EYES AFFECTED BY PROLIFERATIVE RETINOPATHY AND MACULAR EDEMA, WITH LONG-TERM CURRENT USE OF INSULIN: Primary | ICD-10-CM

## 2022-04-26 DIAGNOSIS — E11.3513 TYPE 2 DIABETES MELLITUS WITH BOTH EYES AFFECTED BY PROLIFERATIVE RETINOPATHY AND MACULAR EDEMA, WITH LONG-TERM CURRENT USE OF INSULIN: Primary | ICD-10-CM

## 2022-04-26 NOTE — OUTREACH NOTE
Social Work Assessment  Questions/Answers    Flowsheet Row Most Recent Value   Referral Source nursing   Reason for Consult care coordination/care conference, financial concerns, transportation   Preferred Language English   Employment Status employed part-time   Source of Income salary/wages   Financial/Environmental Concerns unable to afford food   Application for Public Assistance obtained public assistance pending number        SDOH updated and reviewed with the patient during this program:  Financial Resource Strain: Medium Risk   • Difficulty of Paying Living Expenses: Somewhat hard      Food Insecurity: Food Insecurity Present   • Worried About Running Out of Food in the Last Year: Sometimes true   • Ran Out of Food in the Last Year: Sometimes true      Transportation Needs: No Transportation Needs   • Lack of Transportation (Medical): No   • Lack of Transportation (Non-Medical): No      Housing Stability: Low Risk    • Unable to Pay for Housing in the Last Year: No   • Number of Places Lived in the Last Year: 1   • Unstable Housing in the Last Year: No      Continuing Care   [facility]    [address] [facility] [address]    Phone: [phone]    Resource for: Financial Resource Strain   [facility]    [address], [facility] [address]    Phone: [phone]    Resource for: Financial Resource Strain, Food Insecurity   [facility] [facility]    [address] [place] [address]    Phone: [phone]    Resource for: Transportation Needs     Patient Outreach    MSW spoke with patient to discuss resources available for TACK/GRITS transportation. Patient also needed resources for food benefits. Patient states that he has no way to get to the food stamps office. MSW educated patient on process for applying via phone, so that he does not have to drive there. MSW provided above resources for patient to assist with transportation and food. MSW  available if needed in the future.    ROSA TAYLOR -   Ambulatory Case Management    4/26/2022, 12:48 EDT

## 2022-04-26 NOTE — OUTREACH NOTE
[facility] End of Month Documentation    This Chronic Medical Management Care Plan for Rogelio Dee, 52 y.o. male, has been reviewed; established and a new plan of care implemented for the month of April.  A cumulative time of 49  minutes was spent on this patient record this month, including chart review; phone call with patient.    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, bilateral; Stage 4 CKD; Blindness of left eye; Diabetic neuropathy; Suicidal ideation; Depression; Housing situation unstable; Smoker; Chronic diastolic CHF; Essential hypertension; Hyperlipemia, mixed; Chronic pain disorder; Combined forms of age-related cataract, bilateral; Displacement of lumbar intervertebral disc without myelopathy; Ocular hypertension, bilateral; Cigarette nicotine dependence without complication; History of suicidal ideation; Overweight (BMI 25.0-29.9); and Food insecurity on their problem list., the following items were addressed: medical records; medications and any changes can be found within the plan section of the note.  A detailed listing of time spent for chronic care management is tracked within each outreach encounter.  Current medications include:  has a current medication list which includes the following prescription(s): admelog, amlodipine, aspirin, atorvastatin, carvedilol, cholecalciferol, citalopram, clonidine, cyclobenzaprine, dorzolamide-timolol, ferrous sulfate, furosemide, hydralazine, hydrocodone-acetaminophen, tresiba flextouch, insulin lispro (1 unit dial), lisinopril, nifedipine xl, ropinirole, sodium bicarbonate, sodium polystyrene, and tamsulosin. and the patient is reported to be patient is compliant with medication protocol,  Medications are reported to be effective.All notes on chart for PCP to review.    The patient was monitored remotely for blood pressure.    The patient's physical needs include:  needs met.     The patient's mental  support needs include:  needs met    The patient's cognitive support needs include:  not applicable    The patient's psychosocial support needs include:  not applicable    The patient's functional needs include: needs met    The patient's environmental needs include:  not applicable    Care Plan overall comments:  Care plan forth coming    Refer to previous outreach notes for more information on the areas listed above.    Monthly Billing Diagnoses  (E11.3513,  Z79.4) Type 2 diabetes mellitus with both eyes affected by proliferative retinopathy and macular edema, with long-term current use of insulin ([facility])    (H54.40) Blindness of left eye with normal vision in contralateral eye    Medications   · Medications have been reconciled    Care Plan progress this month:      Recently Modified Care Plans Updates made since 3/26/2022 12:00 AM     Diabetes Type 2 (Adult)         Problem Priority Last Modified     ELS GLYCEMIC MANAGEMENT (DIABETES, TYPE 2) (ADULT) --  4/22/2022 12:35 PM by Connor Davis MA              Goal Recent Progress Last Modified     Glycemic Management Optimized --  4/22/2022 12:35 PM by Connor Davis MA     Evidence-based guidance:   Anticipate A1C testing (point-of-care) every 3 to 6 months based on goal attainment.   Review mutually-set A1C goal or target range.   Anticipate use of antihyperglycemic with or without insulin and periodic adjustments; consider active involvement of pharmacist.   Provide medical nutrition therapy and development of individualized eating.   Compare self-reported symptoms of hypo or hyperglycemia to blood glucose levels, diet and fluid intake, current medications, psychosocial and physiologic stressors, change in activity and barriers to care adherence.   Promote self-monitoring of blood glucose levels.   Assess and address barriers to management plan, such as food insecurity, age, developmental ability, depression, anxiety, fear of hypoglycemia or weight gain, as well  as medication cost, side effects and complicated regimen.   Consider referral to community-based diabetes education program, visiting nurse, community health worker or health .   Encourage regular dental care for treatment of periodontal disease; refer to dental provider when needed.    Notes:              Task Due Date Last Modified     Alleviate Barriers to Glycemic Management --  4/22/2022 12:42 PM by Connor Davis MA     Care Management Activities:      - A1C testing facilitated  - barriers to adherence to treatment plan identified  - blood glucose monitoring encouraged  - resources required to improve adherence to care identified  - self-awareness of signs/symptoms of hypo or hyperglycemia encouraged  - use of blood glucose monitoring log promoted      Notes:                Problem Priority Last Modified     ELS DISEASE PROGRESSION (DIABETES, TYPE 2) (ADULT) --  4/22/2022 12:35 PM by Connor Davis MA              Goal Recent Progress Last Modified     Disease Progression Prevented or Minimized --  4/22/2022 12:35 PM by Connor Davis MA     Evidence-based guidance:   Prepare patient for laboratory and diagnostic exams based on risk and presentation.   Encourage lifestyle changes, such as increased intake of plant-based foods, stress reduction, consistent physical activity and smoking cessation to prevent long-term complications and chronic disease.    Individualize activity and exercise recommendations while considering potential limitations, such as neuropathy, retinopathy or the ability to prevent hyperglycemia or hypoglycemia.    Prepare patient for use of pharmacologic therapy that may include antihypertensive, analgesic, prostaglandin E1 with periodic adjustments, based on presenting chronic condition and laboratory results.   Assess signs/symptoms and risk factors for hypertension, sleep-disordered breathing, neuropathy (including changes in gait and balance), retinopathy, nephropathy and sexual  dysfunction.   Address pregnancy planning and contraceptive choice, especially when prescribing antihypertensive or statin.   Ensure completion of annual comprehensive foot exam and dilated eye exam.    Implement additional individualized goals and interventions based on identified risk factors.   Prepare patient for consultation or referral for specialist care, such as ophthalmology, neurology, cardiology, podiatry, nephrology or perinatology.    Notes:              Task Due Date Last Modified     Monitor and Manage Follow-up for Comorbidities --  4/22/2022 12:42 PM by Connor Davis MA     Care Management Activities:      - activity based on tolerance and functional limitations encouraged  - healthy lifestyle promoted  - vital signs and trends reviewed      Notes:                   General Plan of Care (Adult)         Problem Priority Last Modified     ELS HEALTH PROMOTION OR DISEASE SELF-MANAGEMENT (GENERAL PLAN OF CARE) (ADULT) --  4/22/2022 12:35 PM by Connor Davis MA              Goal Recent Progress Last Modified     Self-Management Plan Developed --  4/22/2022 12:35 PM by Connor Davis MA     Evidence-based guidance:   Review biopsychosocial determinants of health screens.   Determine level of modifiable health risk.   Assess level of patient activation, level of readiness, importance and confidence to make changes.   Evoke change talk using open-ended questions, pros and cons, as well as looking forward.   Identify areas where behavior change may lead to improved health.   Partner with patient to develop a robust self-management plan that includes lifestyle factors, such as weight loss, exercise and healthy nutrition, as well as goals specific to disease risks.   Support patient and family/caregiver active participation in decision-making and self-management plan.   Implement additional goals and interventions based on identified risk factors to reduce health risk.   Facilitate advance care planning.    Review need for preventive screening based on age, sex, family history and health history.    Notes:              Task Due Date Last Modified     Mutually Develop and Foster Achievement of Patient Goals --  4/22/2022 12:44 PM by Connor Davis MA     Care Management Activities:      - advance care planning facilitated  - barriers to meeting goals identified  - choices provided  - collaboration with team encouraged  - decision-making supported  - difficulty of making life-long changes acknowledged  - health risks reviewed  - questions answered  - reassurance provided  - resources needed to meet goals identified  - self-reflection promoted  - self-reliance encouraged  - verbalization of feelings encouraged      Notes:                        · Current Specialty Plan of Care Status in process    Instructions   · Patient was provided an electronic copy of care plan  · CCM services were explained and offered and patient has accepted these services.  · Patient has given their written consent to receive CCM services and understands that this includes the authorization of electronic communication of medical information with the other treating providers.  · Patient understands that they may stop CCM services at any time and these changes will be effective at the end of the calendar month and will effectively revocate the agreement of CCM services.  · Patient understands that only one practitioner can furnish and be paid for CCM services during one calendar month.  Patient also understands that there may be co-payment or deductible fees in association with CCM services.  · Patient will continue with at least monthly follow-up calls with the Nurse Navigator.    JULES HUIZAR  Ambulatory Case Management    4/26/2022, 15:45 EDT

## 2022-04-29 RX ORDER — CARVEDILOL 25 MG/1
TABLET ORAL
Qty: 180 TABLET | Refills: 2 | Status: SHIPPED | OUTPATIENT
Start: 2022-04-29 | End: 2023-01-23

## 2022-04-29 NOTE — TELEPHONE ENCOUNTER
Beta-Blockers Protocol Failed 04/29/2022 11:40 AM   Protocol Details  BP under 130/80 in past year and patient has diabetes, CAD or PVD    Recent or future visit with authorizing provider

## 2022-05-09 RX ORDER — ROPINIROLE 0.25 MG/1
TABLET, FILM COATED ORAL
Qty: 90 TABLET | Refills: 2 | Status: SHIPPED | OUTPATIENT
Start: 2022-05-09 | End: 2023-01-23

## 2022-05-09 NOTE — TELEPHONE ENCOUNTER
Antiparkinson Dopaminergic Meds Failed 05/09/2022 09:35 AM   Protocol Details  BP on record    No positive pregnancy test in the past 12 months    Recent or future visit with authorizing provider (12M/90D)    CBC in past 12 months    Serum Creatinine in the past 12 months    AST < 55 OR ALT < 90 in the past 12 months

## 2022-05-16 ENCOUNTER — HOSPITAL ENCOUNTER (OUTPATIENT)
Dept: CT IMAGING | Facility: HOSPITAL | Age: 52
Discharge: HOME | End: 2022-05-16
Admitting: STUDENT IN AN ORGANIZED HEALTH CARE EDUCATION/TRAINING PROGRAM

## 2022-05-16 DIAGNOSIS — F17.210 CIGARETTE NICOTINE DEPENDENCE WITHOUT COMPLICATION: ICD-10-CM

## 2022-05-16 PROCEDURE — 71271 CT THORAX LUNG CANCER SCR C-: CPT

## 2022-05-17 ENCOUNTER — TELEPHONE (OUTPATIENT)
Dept: INTERNAL MEDICINE | Facility: CLINIC | Age: 52
End: 2022-05-17

## 2022-05-17 NOTE — TELEPHONE ENCOUNTER
ATTEMPTED TO CONTACT PT PER PROVIDER'S INSTRUCTIONS     NO ANSWER     LEFT VOICEMAIL WITH INSTRUCTIONS TO RETURN CALL TO OFFICE AT [phone]    OK FOR HUB TO ADVISE/READ     Low dose CT shows no evidence of malignancy

## 2022-05-17 NOTE — TELEPHONE ENCOUNTER
----- Message from Simeon Lomeli MD sent at 5/17/2022  9:05 AM EDT -----  Low dose CT shows no evidence of malignancy.

## 2022-06-15 ENCOUNTER — PATIENT OUTREACH (OUTPATIENT)
Dept: CASE MANAGEMENT | Facility: OTHER | Age: 52
End: 2022-06-15

## 2022-06-15 ENCOUNTER — TELEPHONE (OUTPATIENT)
Dept: INTERNAL MEDICINE | Facility: CLINIC | Age: 52
End: 2022-06-15

## 2022-06-15 DIAGNOSIS — Z79.4 TYPE 2 DIABETES MELLITUS WITH BOTH EYES AFFECTED BY PROLIFERATIVE RETINOPATHY AND MACULAR EDEMA, WITH LONG-TERM CURRENT USE OF INSULIN: Primary | ICD-10-CM

## 2022-06-15 DIAGNOSIS — H54.40 BLINDNESS OF LEFT EYE WITH NORMAL VISION IN CONTRALATERAL EYE: ICD-10-CM

## 2022-06-15 DIAGNOSIS — N18.4 STAGE 4 CHRONIC KIDNEY DISEASE: ICD-10-CM

## 2022-06-15 DIAGNOSIS — E11.3513 TYPE 2 DIABETES MELLITUS WITH BOTH EYES AFFECTED BY PROLIFERATIVE RETINOPATHY AND MACULAR EDEMA, WITH LONG-TERM CURRENT USE OF INSULIN: Primary | ICD-10-CM

## 2022-06-15 NOTE — TELEPHONE ENCOUNTER
PT(PATIENT) VERIFIED     Cologuard - Stool, Per Rectum (2022 02:01)    PT(PATIENT) STATES HE HAS RECEIVED THE KIT BUT HAS NOT YET COMPLETED IT     PT(PATIENT) STATES HE HAS CONSTIPATION     PT(PATIENT) STATES HE ISN'T TAKING ANYTHING YET TO HELP, OTHER THAN JUICE AND THE SUCH    PT(PATIENT) ADVISED TO MAKE AN APPT IF CONSTIPATION IS NOT RESOLVES IN THE NEXT DAY OR TWO    PT(PATIENT) STATES HE HAS AN UPCOMING APPT WITH PROVIDER    Appointment with Simeon Lomeli MD (2022)

## 2022-06-15 NOTE — OUTREACH NOTE
AMBULATORY CASE MANAGEMENT NOTE    Name and Relationship of Patient/Support Person:  -     CCM Interim Update        Per chart review the patient has had low dose CT with the following results :    IMPRESSION:                 1. No suspicious pulmonary nodules.  No evidence of a pulmonary malignancy.  2. Small pericardial effusion.  3. Mild coronary artery calcifications.      PCP aware and original patient contact attempted by office and message left .     MSW has reached out to the patient and addressed the transportation as well as the nutritional needs of the patient . I called and left a message for the patient to return my morales  .      Education Documentation  No documentation found.        JULES HUIZAR  Ambulatory Case Management    6/15/2022, 10:56 EDT   Dr Slater

## 2022-06-15 NOTE — TELEPHONE ENCOUNTER
ATTEMPTED TO CONTACT PT PER PROVIDER'S INSTRUCTIONS     NO ANSWER     LEFT VOICEMAIL WITH INSTRUCTIONS TO RETURN CALL TO OFFICE AT [phone]    OVERDUE LAB RESULTS: HAS HE RECEIVED HIS COLOGUARD KIT?

## 2022-06-21 ENCOUNTER — TELEPHONE (OUTPATIENT)
Dept: CASE MANAGEMENT | Facility: OTHER | Age: 52
End: 2022-06-21

## 2022-06-21 DIAGNOSIS — H54.40 BLINDNESS OF LEFT EYE WITH NORMAL VISION IN CONTRALATERAL EYE: Primary | ICD-10-CM

## 2022-06-21 DIAGNOSIS — N18.4 STAGE 4 CHRONIC KIDNEY DISEASE: ICD-10-CM

## 2022-06-21 DIAGNOSIS — E11.3513 TYPE 2 DIABETES MELLITUS WITH BOTH EYES AFFECTED BY PROLIFERATIVE RETINOPATHY AND MACULAR EDEMA, WITH LONG-TERM CURRENT USE OF INSULIN: ICD-10-CM

## 2022-06-21 DIAGNOSIS — Z79.4 TYPE 2 DIABETES MELLITUS WITH BOTH EYES AFFECTED BY PROLIFERATIVE RETINOPATHY AND MACULAR EDEMA, WITH LONG-TERM CURRENT USE OF INSULIN: ICD-10-CM

## 2022-06-21 PROBLEM — M47.816 LUMBAR SPONDYLOSIS: Status: ACTIVE | Noted: 2022-06-21

## 2022-06-24 ENCOUNTER — TELEPHONE (OUTPATIENT)
Dept: INTERNAL MEDICINE | Facility: CLINIC | Age: 52
End: 2022-06-24

## 2022-06-27 NOTE — TELEPHONE ENCOUNTER
PT(PATIENT) HAS APPT ON 7/21/22    PT(PATIENT) HAS NOT COMPLETED COLOGUARD.  REFER TO Telephone with Simeon Lomeli MD (06/15/2022)

## 2022-07-11 ENCOUNTER — PATIENT OUTREACH (OUTPATIENT)
Dept: CASE MANAGEMENT | Facility: OTHER | Age: 52
End: 2022-07-11

## 2022-07-11 DIAGNOSIS — H54.40 BLINDNESS OF LEFT EYE WITH NORMAL VISION IN CONTRALATERAL EYE: Primary | ICD-10-CM

## 2022-07-11 DIAGNOSIS — Z79.4 TYPE 2 DIABETES MELLITUS WITH BOTH EYES AFFECTED BY PROLIFERATIVE RETINOPATHY AND MACULAR EDEMA, WITH LONG-TERM CURRENT USE OF INSULIN: ICD-10-CM

## 2022-07-11 DIAGNOSIS — E11.3513 TYPE 2 DIABETES MELLITUS WITH BOTH EYES AFFECTED BY PROLIFERATIVE RETINOPATHY AND MACULAR EDEMA, WITH LONG-TERM CURRENT USE OF INSULIN: ICD-10-CM

## 2022-07-11 NOTE — OUTREACH NOTE
AMBULATORY CASE MANAGEMENT NOTE    Name and Relationship of Patient/Support Person: Rogelio Dee - Self    CCM Interim Update       Called and left message to return call.       Education Documentation  No documentation found.        JULES HUIZAR  Ambulatory Case Management    7/11/2022, 15:29 EDT

## 2022-07-21 ENCOUNTER — OFFICE VISIT (OUTPATIENT)
Dept: INTERNAL MEDICINE | Facility: CLINIC | Age: 52
End: 2022-07-21

## 2022-07-21 VITALS
HEART RATE: 80 BPM | OXYGEN SATURATION: 96 % | HEIGHT: 68 IN | DIASTOLIC BLOOD PRESSURE: 97 MMHG | SYSTOLIC BLOOD PRESSURE: 151 MMHG | BODY MASS INDEX: 25.88 KG/M2 | WEIGHT: 170.8 LBS | TEMPERATURE: 97.3 F

## 2022-07-21 DIAGNOSIS — F33.1 MODERATE EPISODE OF RECURRENT MAJOR DEPRESSIVE DISORDER: ICD-10-CM

## 2022-07-21 DIAGNOSIS — K21.9 GASTROESOPHAGEAL REFLUX DISEASE WITHOUT ESOPHAGITIS: ICD-10-CM

## 2022-07-21 DIAGNOSIS — I50.32 CHRONIC DIASTOLIC (CONGESTIVE) HEART FAILURE: ICD-10-CM

## 2022-07-21 DIAGNOSIS — E11.3513 TYPE 2 DIABETES MELLITUS WITH BOTH EYES AFFECTED BY PROLIFERATIVE RETINOPATHY AND MACULAR EDEMA, WITH LONG-TERM CURRENT USE OF INSULIN: Primary | ICD-10-CM

## 2022-07-21 DIAGNOSIS — H54.40 BLINDNESS OF LEFT EYE WITH NORMAL VISION IN CONTRALATERAL EYE: ICD-10-CM

## 2022-07-21 DIAGNOSIS — Z59.41 FOOD INSECURITY: ICD-10-CM

## 2022-07-21 DIAGNOSIS — F17.210 CIGARETTE NICOTINE DEPENDENCE WITHOUT COMPLICATION: ICD-10-CM

## 2022-07-21 DIAGNOSIS — R80.8 OTHER PROTEINURIA: ICD-10-CM

## 2022-07-21 DIAGNOSIS — B35.1 ONYCHOMYCOSIS: ICD-10-CM

## 2022-07-21 DIAGNOSIS — N18.4 STAGE 4 CHRONIC KIDNEY DISEASE: ICD-10-CM

## 2022-07-21 DIAGNOSIS — Z79.4 TYPE 2 DIABETES MELLITUS WITH BOTH EYES AFFECTED BY PROLIFERATIVE RETINOPATHY AND MACULAR EDEMA, WITH LONG-TERM CURRENT USE OF INSULIN: Primary | ICD-10-CM

## 2022-07-21 LAB
25(OH)D3 SERPL-MCNC: 34.3 NG/ML (ref 30–100)
BASOPHILS # BLD AUTO: 0.08 10*3/MM3 (ref 0–0.2)
BASOPHILS NFR BLD AUTO: 0.9 % (ref 0–1.5)
DEPRECATED RDW RBC AUTO: 38.3 FL (ref 37–54)
EOSINOPHIL # BLD AUTO: 0.15 10*3/MM3 (ref 0–0.4)
EOSINOPHIL NFR BLD AUTO: 1.6 % (ref 0.3–6.2)
ERYTHROCYTE [DISTWIDTH] IN BLOOD BY AUTOMATED COUNT: 12 % (ref 12.3–15.4)
FERRITIN SERPL-MCNC: 257 NG/ML (ref 30–400)
HBA1C MFR BLD: 9.8 % (ref 4.8–5.6)
HCT VFR BLD AUTO: 32.6 % (ref 37.5–51)
HGB BLD-MCNC: 10.9 G/DL (ref 13–17.7)
IMM GRANULOCYTES # BLD AUTO: 0.06 10*3/MM3 (ref 0–0.05)
IMM GRANULOCYTES NFR BLD AUTO: 0.7 % (ref 0–0.5)
IRON 24H UR-MRATE: 66 MCG/DL (ref 59–158)
IRON SATN MFR SERPL: 21 % (ref 20–50)
LYMPHOCYTES # BLD AUTO: 1.15 10*3/MM3 (ref 0.7–3.1)
LYMPHOCYTES NFR BLD AUTO: 12.5 % (ref 19.6–45.3)
MCH RBC QN AUTO: 29.3 PG (ref 26.6–33)
MCHC RBC AUTO-ENTMCNC: 33.4 G/DL (ref 31.5–35.7)
MCV RBC AUTO: 87.6 FL (ref 79–97)
MONOCYTES # BLD AUTO: 0.65 10*3/MM3 (ref 0.1–0.9)
MONOCYTES NFR BLD AUTO: 7.1 % (ref 5–12)
NEUTROPHILS NFR BLD AUTO: 7.11 10*3/MM3 (ref 1.7–7)
NEUTROPHILS NFR BLD AUTO: 77.2 % (ref 42.7–76)
NRBC BLD AUTO-RTO: 0 /100 WBC (ref 0–0.2)
PLATELET # BLD AUTO: 352 10*3/MM3 (ref 140–450)
PMV BLD AUTO: 11.2 FL (ref 6–12)
PTH-INTACT SERPL-MCNC: 146 PG/ML (ref 15–65)
RBC # BLD AUTO: 3.72 10*6/MM3 (ref 4.14–5.8)
TIBC SERPL-MCNC: 322 MCG/DL (ref 298–536)
TRANSFERRIN SERPL-MCNC: 216 MG/DL (ref 200–360)
WBC NRBC COR # BLD: 9.2 10*3/MM3 (ref 3.4–10.8)

## 2022-07-21 PROCEDURE — 85025 COMPLETE CBC W/AUTO DIFF WBC: CPT | Performed by: STUDENT IN AN ORGANIZED HEALTH CARE EDUCATION/TRAINING PROGRAM

## 2022-07-21 PROCEDURE — 83540 ASSAY OF IRON: CPT | Performed by: STUDENT IN AN ORGANIZED HEALTH CARE EDUCATION/TRAINING PROGRAM

## 2022-07-21 PROCEDURE — 82728 ASSAY OF FERRITIN: CPT | Performed by: STUDENT IN AN ORGANIZED HEALTH CARE EDUCATION/TRAINING PROGRAM

## 2022-07-21 PROCEDURE — 82306 VITAMIN D 25 HYDROXY: CPT | Performed by: STUDENT IN AN ORGANIZED HEALTH CARE EDUCATION/TRAINING PROGRAM

## 2022-07-21 PROCEDURE — 83036 HEMOGLOBIN GLYCOSYLATED A1C: CPT | Performed by: STUDENT IN AN ORGANIZED HEALTH CARE EDUCATION/TRAINING PROGRAM

## 2022-07-21 PROCEDURE — 80053 COMPREHEN METABOLIC PANEL: CPT | Performed by: STUDENT IN AN ORGANIZED HEALTH CARE EDUCATION/TRAINING PROGRAM

## 2022-07-21 PROCEDURE — 83970 ASSAY OF PARATHORMONE: CPT | Performed by: STUDENT IN AN ORGANIZED HEALTH CARE EDUCATION/TRAINING PROGRAM

## 2022-07-21 PROCEDURE — 99214 OFFICE O/P EST MOD 30 MIN: CPT | Performed by: STUDENT IN AN ORGANIZED HEALTH CARE EDUCATION/TRAINING PROGRAM

## 2022-07-21 PROCEDURE — 84466 ASSAY OF TRANSFERRIN: CPT | Performed by: STUDENT IN AN ORGANIZED HEALTH CARE EDUCATION/TRAINING PROGRAM

## 2022-07-21 RX ORDER — OMEPRAZOLE 40 MG/1
40 CAPSULE, DELAYED RELEASE ORAL DAILY
Qty: 90 CAPSULE | Refills: 2 | Status: SHIPPED | OUTPATIENT
Start: 2022-07-21

## 2022-07-21 SDOH — ECONOMIC STABILITY - FOOD INSECURITY: FOOD INSECURITY: Z59.41

## 2022-07-21 NOTE — PROGRESS NOTES
Chief Complaint  Follow-up and Diabetes    Subjective          Rogelio Dee presents to [facility]  History of Present Illness    Last saw nephro in May, and seeing again in August.    Checks BP at home.  Running systolic 150s/90s at home.    Quit vaping, now down to 10 cigarettes a day    Reliant on food genao for much of his food, and unable to comply with a renal diet.  Was unable to obtain food stamps as ID has  and new one never arrived in mail.  He reports he tends to stay about 3 months behind on rent.    He reports depression is still a problem.  He denies SI, and is [facility].      Current Outpatient Medications   Medication Instructions   • Admelog 100 UNIT/ML injection ADMINISTER 15 UNITS UNDER THE SKIN BEFORE A MEAL AS NEEDED PER SLIDING SCALE   • amLODIPine (NORVASC) 10 MG tablet amlodipine 10 mg tablet   • aspirin 81 mg, Oral, Daily   • atorvastatin (LIPITOR) 20 mg, Oral, Daily   • carvedilol (COREG) 25 MG tablet TAKE 1 TABLET BY MOUTH TWICE DAILY WITH MEALS   • Cholecalciferol 2,000 Units, Oral, Daily   • citalopram (CELEXA) 40 mg, Oral, Daily   • cloNIDine (CATAPRES) 0.1 mg, Oral, 2 Times Daily   • cyclobenzaprine (FLEXERIL) 10 MG tablet Every 8 Hours Scheduled   • dorzolamide-timolol (COSOPT) 22.3-6.8 MG/ML ophthalmic solution 1 drop, Both Eyes, 2 Times Daily   • ferrous sulfate 325 (65 FE) MG tablet FeroSul 325 mg (65 mg iron) tablet   TAKE 1 TABLET BY MOUTH TWICE DAILY   • furosemide (LASIX) 40 mg, Oral, Daily   • hydrALAZINE (APRESOLINE) 100 mg, Oral, 3 Times Daily   • HYDROcodone-acetaminophen (NORCO) 7.5-325 MG per tablet Every 6 Hours   • Insulin Lispro (1 Unit Dial) (HUMALOG) 8 Units, Subcutaneous, Daily With Breakfast, Lunch & Dinner   • lisinopril (PRINIVIL,ZESTRIL) 20 MG tablet lisinopril 20 mg tablet   TAKE 1 TABLET BY MOUTH DAILY   • NIFEdipine XL (PROCARDIA XL) 60 mg, Oral, Daily   • omeprazole (PRILOSEC) 40 mg,  "Oral, Daily   • rOPINIRole (REQUIP) 0.25 MG tablet TAKE 1 TABLET BY MOUTH EVERY NIGHT 1 HOUR BEFORE BEDTIME   • sodium bicarbonate 650 MG tablet sodium bicarbonate 650 mg tablet   • sodium polystyrene (KAYEXALATE) 15 GM/60ML suspension SPS (with sorbitol) 15 gram-20 gram/60 mL oral suspension   • tamsulosin (FLOMAX) 0.4 mg, Oral, Daily       The following portions of the patient's history were reviewed and updated as appropriate: allergies, current medications, past family history, past medical history, past social history, past surgical history, and problem list.    Objective   Vital Signs:   /97   Pulse 80   Temp 97.3 °F (36.3 °C) (Temporal)   Ht 172.7 cm (68\")   Wt 77.5 kg (170 lb 12.8 oz)   SpO2 96%   BMI 25.97 kg/m²     Wt Readings from Last 3 Encounters:   07/21/22 77.5 kg (170 lb 12.8 oz)   04/21/22 78.4 kg (172 lb 12.8 oz)   03/14/22 77.6 kg (171 lb)     BP Readings from Last 3 Encounters:   07/21/22 151/97   04/21/22 142/85   03/14/22 166/71     Physical Exam  Vitals reviewed.   Constitutional:       Appearance: Normal appearance.   HENT:      Head: Normocephalic and atraumatic.   Eyes:      Conjunctiva/sclera: Conjunctivae normal.   Cardiovascular:      Rate and Rhythm: Normal rate and regular rhythm.      Pulses: Normal pulses.      Heart sounds: Normal heart sounds. No murmur heard.  Pulmonary:      Effort: Pulmonary effort is normal.      Breath sounds: Normal breath sounds. No wheezing.   Abdominal:      General: Abdomen is flat.      Palpations: Abdomen is soft. There is no mass.      Tenderness: There is no abdominal tenderness.   Musculoskeletal:      Right lower leg: No edema.      Left lower leg: No edema.      Comments: Missing great toe bilaterally.  Digits 2-5 both feet with thickened toenails noted   Skin:     General: Skin is warm and dry.   Neurological:      General: No focal deficit present.      Mental Status: He is alert. Mental status is at baseline.   Psychiatric:         " Behavior: Behavior normal.         Thought Content: Thought content normal.         Judgment: Judgment normal.           Result Review :   The following data was reviewed by: Simeon Lomeli MD on 07/21/2022:  Common labs    Common Labsle 4/21/22 4/21/22 4/21/22 4/21/22 4/21/22    1243 1243 1243 1243 1243   Glucose     339 (A)   BUN     69 (A)   Creatinine     3.18 (A)   Sodium     129 (A)   Potassium     4.1   Chloride     92 (A)   Calcium     9.4   Albumin     4.70   Total Bilirubin     0.2   Alkaline Phosphatase     126 (A)   AST (SGOT)     11   ALT (SGPT)     13   WBC 10.61       Hemoglobin 11.1 (A)       Hematocrit 33.9 (A)       Platelets 340       Total Cholesterol    133    Triglycerides    195 (A)    HDL Cholesterol    30 (A)    LDL Cholesterol     70    Hemoglobin A1C   10.50 (A)     Microalbumin, Urine  84.7      (A) Abnormal value                   Lab Results   Component Value Date    INR 0.91 (L) 09/02/2020    BILIRUBINUR Negative 07/01/2021       Procedures        Assessment and Plan    Diagnoses and all orders for this visit:    1. Type 2 diabetes mellitus with both eyes affected by proliferative retinopathy and macular edema, with long-term current use of insulin ([facility]) (Primary)  -     Comprehensive Metabolic Panel  -     Hemoglobin A1c    2. Stage 4 chronic kidney disease ([facility])  -     Comprehensive Metabolic Panel  -     Hemoglobin A1c  -     PTH, Intact  -     CBC & Differential  -     Iron Profile  -     Ferritin  -     Vitamin D 25 Hydroxy    3. Chronic diastolic (congestive) heart failure ([facility])    4. Blindness of left eye with normal vision in contralateral eye    5. Food insecurity    6. Cigarette nicotine dependence without complication    7. Other proteinuria    8. Gastroesophageal reflux disease without esophagitis  -     omeprazole (priLOSEC) 40 MG capsule; Take 1 capsule by mouth Daily.  Dispense: 90 capsule; Refill: 2    9. Moderate episode of recurrent major depressive disorder  ([facility])  -     Cancel: Ambulatory Referral to Psychology  -     Ambulatory Referral to Psychology    10. Onychomycosis  -     Ambulatory Referral to Podiatry    Other orders  -     Cholecalciferol 50 MCG (2000 UT) capsule; Take 1 capsule by mouth Daily.  Dispense: 90 each; Refill: 3      Tobacco:  -I discussed at length the health consequences of smoking, and strongly recommended smoking cessation  -he is not interested in smoking cessation at this time    Medications Discontinued During This Encounter   Medication Reason   • Cholecalciferol 50 MCG (2000 UT) capsule Reorder          Follow Up   Return in about 3 months (around 10/21/2022) for T2DM, CKD.  Patient was given instructions and counseling regarding his condition or for health maintenance advice. Please see specific information pulled into the AVS if appropriate.       Simeon Lomeli MD  07/21/22  17:22 EDT

## 2022-07-22 ENCOUNTER — TELEPHONE (OUTPATIENT)
Dept: INTERNAL MEDICINE | Facility: CLINIC | Age: 52
End: 2022-07-22

## 2022-07-22 DIAGNOSIS — E87.20 METABOLIC ACIDOSIS: ICD-10-CM

## 2022-07-22 DIAGNOSIS — Z79.4 TYPE 2 DIABETES MELLITUS WITH BOTH EYES AFFECTED BY PROLIFERATIVE RETINOPATHY AND MACULAR EDEMA, WITH LONG-TERM CURRENT USE OF INSULIN: ICD-10-CM

## 2022-07-22 DIAGNOSIS — N18.4 STAGE 4 CHRONIC KIDNEY DISEASE: Primary | ICD-10-CM

## 2022-07-22 DIAGNOSIS — E11.3513 TYPE 2 DIABETES MELLITUS WITH BOTH EYES AFFECTED BY PROLIFERATIVE RETINOPATHY AND MACULAR EDEMA, WITH LONG-TERM CURRENT USE OF INSULIN: ICD-10-CM

## 2022-07-22 LAB
ALBUMIN SERPL-MCNC: 4.3 G/DL (ref 3.5–5.2)
ALBUMIN/GLOB SERPL: 1.3 G/DL
ALP SERPL-CCNC: 116 U/L (ref 39–117)
ALT SERPL W P-5'-P-CCNC: 11 U/L (ref 1–41)
ANION GAP SERPL CALCULATED.3IONS-SCNC: 16 MMOL/L (ref 5–15)
AST SERPL-CCNC: 8 U/L (ref 1–40)
BILIRUB SERPL-MCNC: 0.3 MG/DL (ref 0–1.2)
BUN SERPL-MCNC: 63 MG/DL (ref 6–20)
BUN/CREAT SERPL: 18.1 (ref 7–25)
CALCIUM SPEC-SCNC: 9.7 MG/DL (ref 8.6–10.5)
CHLORIDE SERPL-SCNC: 91 MMOL/L (ref 98–107)
CO2 SERPL-SCNC: 20 MMOL/L (ref 22–29)
CREAT SERPL-MCNC: 3.49 MG/DL (ref 0.76–1.27)
EGFRCR SERPLBLD CKD-EPI 2021: 20.3 ML/MIN/1.73
GLOBULIN UR ELPH-MCNC: 3.4 GM/DL
GLUCOSE SERPL-MCNC: 461 MG/DL (ref 65–99)
POTASSIUM SERPL-SCNC: 4.1 MMOL/L (ref 3.5–5.2)
PROT SERPL-MCNC: 7.7 G/DL (ref 6–8.5)
SODIUM SERPL-SCNC: 127 MMOL/L (ref 136–145)

## 2022-07-22 RX ORDER — SODIUM BICARBONATE 650 MG/1
650 TABLET ORAL 3 TIMES DAILY
Qty: 270 TABLET | Refills: 2 | Status: SHIPPED | OUTPATIENT
Start: 2022-07-22

## 2022-07-22 NOTE — PROGRESS NOTES
A1c is 9.8%, and above goal.  I'd like to re-start the tresiba.  I believe you were previously on 10U.  I'd like to go up to 15U.    CMP shows acidosis, and I think we'll need to go up on your sodium bicarb.  I have sent a new prescription.  Your GFR is 20.    Vitamin D is at goal.    Iron studies suggest that you have an adequate supply of iron.    CBC shows mild anemia.    PTH is increased.  I will defer to your nephrology on this matter.

## 2022-07-22 NOTE — TELEPHONE ENCOUNTER
----- Message from Simeon Lomeli MD sent at 7/22/2022  8:12 AM EDT -----  A1c is 9.8%, and above goal.  I'd like to re-start the tresiba.  I believe you were previously on 10U.  I'd like to go up to 15U.    CMP shows acidosis, and I think we'll need to go up on your sodium bicarb.  I have sent a new prescription.  Your GFR is 20.    Vitamin D is at goal.    Iron studies suggest that you have an adequate supply of iron.    CBC shows mild anemia.    PTH is increased.  I will defer to your nephrology on this matter.

## 2022-07-26 ENCOUNTER — TELEPHONE (OUTPATIENT)
Dept: INTERNAL MEDICINE | Facility: CLINIC | Age: 52
End: 2022-07-26

## 2022-07-26 NOTE — TELEPHONE ENCOUNTER
SPOKE WITH PATIENT. VERIFIED .     CONTACTED PATIENT IN REGARDS TO AN OVERDUE COLOGUARD ORDER.     PATIENT DID STATE HE HAS RECEIVED THE KIT HOWEVER HE HAS JUST NOT COMPLETED IT.     I EXPLAINED THAT I WAS CALLING TO REMIND THE PATIENT IT IS NEEDING COMPLETED.    PATIENT IS OF UNDERSTANDING.

## 2022-07-28 ENCOUNTER — PATIENT OUTREACH (OUTPATIENT)
Dept: CASE MANAGEMENT | Facility: OTHER | Age: 52
End: 2022-07-28

## 2022-07-28 DIAGNOSIS — I10 ESSENTIAL HYPERTENSION: Primary | ICD-10-CM

## 2022-07-28 DIAGNOSIS — E11.3513 TYPE 2 DIABETES MELLITUS WITH BOTH EYES AFFECTED BY PROLIFERATIVE RETINOPATHY AND MACULAR EDEMA, WITH LONG-TERM CURRENT USE OF INSULIN: ICD-10-CM

## 2022-07-28 DIAGNOSIS — Z79.4 TYPE 2 DIABETES MELLITUS WITH BOTH EYES AFFECTED BY PROLIFERATIVE RETINOPATHY AND MACULAR EDEMA, WITH LONG-TERM CURRENT USE OF INSULIN: ICD-10-CM

## 2022-07-28 NOTE — OUTREACH NOTE
AMBULATORY CASE MANAGEMENT NOTE    Name and Relationship of Patient/Support Person: Rogelio Dee - Self  Self    CCM Interim Update      Left message to return my call 2 calls in July       Education Documentation  No documentation found.        JULES HUIZAR  Ambulatory Case Management    7/28/2022, 15:04 EDT

## 2022-08-11 ENCOUNTER — PATIENT OUTREACH (OUTPATIENT)
Dept: CASE MANAGEMENT | Facility: OTHER | Age: 52
End: 2022-08-11

## 2022-08-11 DIAGNOSIS — I10 ESSENTIAL HYPERTENSION: Primary | ICD-10-CM

## 2022-08-11 DIAGNOSIS — H54.40 BLINDNESS OF LEFT EYE WITH NORMAL VISION IN CONTRALATERAL EYE: ICD-10-CM

## 2022-08-11 DIAGNOSIS — E11.3513 TYPE 2 DIABETES MELLITUS WITH BOTH EYES AFFECTED BY PROLIFERATIVE RETINOPATHY AND MACULAR EDEMA, WITH LONG-TERM CURRENT USE OF INSULIN: ICD-10-CM

## 2022-08-11 DIAGNOSIS — Z79.4 TYPE 2 DIABETES MELLITUS WITH BOTH EYES AFFECTED BY PROLIFERATIVE RETINOPATHY AND MACULAR EDEMA, WITH LONG-TERM CURRENT USE OF INSULIN: ICD-10-CM

## 2022-08-11 DIAGNOSIS — N18.4 STAGE 4 CHRONIC KIDNEY DISEASE: ICD-10-CM

## 2022-08-11 PROCEDURE — 99490 CHRNC CARE MGMT STAFF 1ST 20: CPT | Performed by: STUDENT IN AN ORGANIZED HEALTH CARE EDUCATION/TRAINING PROGRAM

## 2022-08-31 ENCOUNTER — PATIENT OUTREACH (OUTPATIENT)
Dept: CASE MANAGEMENT | Facility: OTHER | Age: 52
End: 2022-08-31

## 2022-08-31 DIAGNOSIS — I10 ESSENTIAL HYPERTENSION: Primary | ICD-10-CM

## 2022-08-31 DIAGNOSIS — E11.3513 TYPE 2 DIABETES MELLITUS WITH BOTH EYES AFFECTED BY PROLIFERATIVE RETINOPATHY AND MACULAR EDEMA, WITH LONG-TERM CURRENT USE OF INSULIN: ICD-10-CM

## 2022-08-31 DIAGNOSIS — Z79.4 TYPE 2 DIABETES MELLITUS WITH BOTH EYES AFFECTED BY PROLIFERATIVE RETINOPATHY AND MACULAR EDEMA, WITH LONG-TERM CURRENT USE OF INSULIN: ICD-10-CM

## 2022-08-31 DIAGNOSIS — H54.40 BLINDNESS OF LEFT EYE WITH NORMAL VISION IN CONTRALATERAL EYE: ICD-10-CM

## 2022-08-31 NOTE — OUTREACH NOTE
[facility] End of Month Documentation    This Chronic Medical Management Care Plan for Rogelio Dee, 52 y.o. male, has been reviewed; established and a new plan of care implemented for the month of August.  A cumulative time of 20  minutes was spent on this patient record this month, including chart review; phone call with patient; phone call with other providers.    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, bilateral; Stage 4 CKD; Blindness of left eye; Diabetic neuropathy; Suicidal ideation; Depression; Housing situation unstable; Smoker; Chronic diastolic CHF; Essential hypertension; Hyperlipemia, mixed; Chronic pain disorder; Combined forms of age-related cataract, bilateral; Displacement of lumbar intervertebral disc without myelopathy; Ocular hypertension, bilateral; Cigarette nicotine dependence without complication; History of suicidal ideation; Overweight (BMI 25.0-29.9); Food insecurity; and Lumbar spondylosis on their problem list., the following items were addressed: medical records; medications and any changes can be found within the plan section of the note.  A detailed listing of time spent for chronic care management is tracked within each outreach encounter.  Current medications include:  has a current medication list which includes the following prescription(s): admelog, amlodipine, aspirin, atorvastatin, carvedilol, cholecalciferol, citalopram, clonidine, cyclobenzaprine, dorzolamide-timolol, ferrous sulfate, furosemide, hydralazine, hydrocodone-acetaminophen, insulin degludec, insulin lispro (1 unit dial), lisinopril, nifedipine xl, omeprazole, ropinirole, sodium bicarbonate, sodium polystyrene, and tamsulosin. and the patient is reported to be patient is compliant with medication protocol,  Medications are reported to be effective.  All notes on chart for PCP to review.    The patient was monitored remotely for blood pressure;  medications.    The patient's physical needs include:  needs met.     The patient's mental support needs include:  needs met    The patient's cognitive support needs include:  not applicable    The patient's psychosocial support needs include:  not applicable    The patient's functional needs include: needs met    The patient's environmental needs include:  not applicable, N/A    Care Plan overall comments:  Care plan updated,    Refer to previous outreach notes for more information on the areas listed above.    Monthly Billing Diagnoses  (I10) Essential hypertension    (E11.3513,  Z79.4) Type 2 diabetes mellitus with both eyes affected by proliferative retinopathy and macular edema, with long-term current use of insulin ([facility])    (H54.40) Blindness of left eye with normal vision in contralateral eye    Medications   · Medications have been reconciled    Care Plan progress this month:      Recently Modified Care Plans Updates made since 7/31/2022 12:00 AM    No recently modified care plans.          · Current Specialty Plan of Care Status in process    Instructions   · Patient was provided an electronic copy of care plan  · CCM services were explained and offered and patient has accepted these services.  · Patient has given their written consent to receive CCM services and understands that this includes the authorization of electronic communication of medical information with the other treating providers.  · Patient understands that they may stop CCM services at any time and these changes will be effective at the end of the calendar month and will effectively revocate the agreement of CCM services.  · Patient understands that only one practitioner can furnish and be paid for CCM services during one calendar month.  Patient also understands that there may be co-payment or deductible fees in association with CCM services.  · Patient will continue with at least monthly follow-up calls with the Nurse Navigator.    JULES  B  Ambulatory Case Management    8/31/2022, 13:26 EDT

## 2022-09-13 RX ORDER — NIFEDIPINE 30 MG/1
TABLET, EXTENDED RELEASE ORAL
Qty: 180 TABLET | Refills: 0 | Status: SHIPPED | OUTPATIENT
Start: 2022-09-13 | End: 2022-12-13

## 2022-09-19 ENCOUNTER — OFFICE VISIT (OUTPATIENT)
Dept: PODIATRY | Facility: CLINIC | Age: 52
End: 2022-09-19

## 2022-09-19 VITALS
HEIGHT: 68 IN | DIASTOLIC BLOOD PRESSURE: 75 MMHG | SYSTOLIC BLOOD PRESSURE: 149 MMHG | HEART RATE: 80 BPM | WEIGHT: 165 LBS | BODY MASS INDEX: 25.01 KG/M2 | TEMPERATURE: 97.3 F | OXYGEN SATURATION: 100 %

## 2022-09-19 DIAGNOSIS — B35.1 ONYCHOMYCOSIS: Primary | ICD-10-CM

## 2022-09-19 DIAGNOSIS — M79.671 FOOT PAIN, BILATERAL: ICD-10-CM

## 2022-09-19 DIAGNOSIS — G62.9 NEUROPATHY: ICD-10-CM

## 2022-09-19 DIAGNOSIS — Z79.4 TYPE 2 DIABETES MELLITUS WITH DIABETIC POLYNEUROPATHY, WITH LONG-TERM CURRENT USE OF INSULIN: ICD-10-CM

## 2022-09-19 DIAGNOSIS — M79.672 FOOT PAIN, BILATERAL: ICD-10-CM

## 2022-09-19 DIAGNOSIS — S98.139A AMPUTATED TOE, UNSPECIFIED LATERALITY: ICD-10-CM

## 2022-09-19 DIAGNOSIS — E11.42 TYPE 2 DIABETES MELLITUS WITH DIABETIC POLYNEUROPATHY, WITH LONG-TERM CURRENT USE OF INSULIN: ICD-10-CM

## 2022-09-19 DIAGNOSIS — L60.0 ONYCHOCRYPTOSIS: ICD-10-CM

## 2022-09-19 PROCEDURE — G8404 LOW EXTEMITY NEUR EXAM DOCUM: HCPCS | Performed by: PODIATRIST

## 2022-09-19 PROCEDURE — 11721 DEBRIDE NAIL 6 OR MORE: CPT | Performed by: PODIATRIST

## 2022-09-19 PROCEDURE — 99203 OFFICE O/P NEW LOW 30 MIN: CPT | Performed by: PODIATRIST

## 2022-09-19 NOTE — PROGRESS NOTES
[facility] - PODIATRY    Today's Date: 09/19/22    Patient Name: Rogelio Dee  MRN: [id number]  CSN: [id number]  PCP: Simeon Lomeli MD, Last PCP Visit: 21 July 2022  Referring Provider: Simeon Lomeli MD    SUBJECTIVE     Chief Complaint   Patient presents with   • Left Foot - Establish Care, Annual Exam, Diabetes, Nail Problem   • Right Foot - Establish Care, Annual Exam, Diabetes, Nail Problem     HPI: Rogelio Dee, a 52 y.o.male, presents to clinic for painful toenail and a diabetic foot evaluation.    New, Established, New Problem:  New  Location:  Toenails  Duration:   Greater than five years  Onset:  Gradual  Nature:  sore with palpation.  Stable, worsening, improving:   worsening  Aggravating factors:  Pain with shoe gear and ambulation.  Previous Treatment: Unable to trim their own toenails.    Patient controlling diabetes via: Insulin, poorly controlled    Patient states their last blood glucose was: 270, PCP aware of the patient's elevated blood sugars    Patient denies any fevers, chills, nausea, vomiting, shortness of breath, nor any other constitutional signs nor symptoms.    She relates previous first ray amputations in distant past by Dr. Farias.    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  Missed appt in [place] for PRP, partially because of ride issues and partially because he is hesitant to undergo PRP because it is uncomfortable.   Avastin today.  Discussed retrobulbar for PRP vs. Treatment with anti-VEGF exclusively.  Pt will consider options.  If doing PRP, will try to get done in 1 session in order to minimize trips to [place].  PDR OS -     Past Surgical History:   Procedure Laterality Date   • LUMBAR DISC SURGERY     • TOE AMPUTATION       Family History   Family history unknown: Yes     Social History     Socioeconomic History   • Marital status:    Tobacco Use   • Smoking status: Current Every Day Smoker     Packs/day: 0.50     Years: 30.00     Pack years: 15.00     Types: Cigarettes   • Smokeless tobacco: Never Used   • Tobacco comment: 10 cigs a day   Vaping Use   • Vaping Use: Former   Substance and Sexual Activity   • Alcohol use: Never   • Drug use: Never   • Sexual activity: Defer     Allergies   Allergen Reactions   • Oxycodone Mental Status Change and Other (See Comments)     Current Outpatient Medications   Medication Sig Dispense Refill   • Admelog 100 UNIT/ML injection ADMINISTER 15 UNITS UNDER THE SKIN BEFORE A MEAL AS NEEDED PER SLIDING SCALE     • amLODIPine (NORVASC) 10 MG tablet amlodipine 10 mg tablet     • aspirin 81 MG EC tablet Take 1 tablet by mouth Daily. 90 tablet 1   • atorvastatin (LIPITOR) 20 MG tablet Take 1 tablet by mouth Daily. 90 tablet 2   • carvedilol (COREG) 25 MG tablet TAKE 1 TABLET BY MOUTH TWICE DAILY WITH MEALS 180 tablet 2   • Cholecalciferol 50 MCG (2000 UT) capsule Take 1 capsule by mouth Daily. 90 each 3   • citalopram (CeleXA) 40 MG tablet Take 1 tablet by mouth Daily. 90 tablet 2   • cloNIDine (CATAPRES) 0.1 MG tablet Take 1 tablet by mouth 2 (Two) Times a Day. 180 tablet 2   • cyclobenzaprine (FLEXERIL) 10 MG tablet Every 8 (Eight) Hours.     • dorzolamide-timolol (COSOPT) 22.3-6.8 MG/ML ophthalmic solution Administer 1 drop to both eyes 2 (Two) Times a Day.     • ferrous sulfate 325 (65 FE) MG tablet FeroSul 325 mg (65 mg iron) tablet   TAKE 1 TABLET BY MOUTH TWICE DAILY     •  furosemide (LASIX) 40 MG tablet Take 1 tablet by mouth Daily. 90 tablet 2   • hydrALAZINE (APRESOLINE) 100 MG tablet Take 1 tablet by mouth 3 (Three) Times a Day. 270 tablet 2   • HYDROcodone-acetaminophen (NORCO) 7.5-325 MG per tablet Every 6 (Six) Hours.     • insulin degludec (TRESIBA FLEXTOUCH) 100 UNIT/ML solution pen-injector injection Inject 15 Units under the skin into the appropriate area as directed Daily. 2 mL 3   • Insulin Lispro, 1 Unit Dial, (HUMALOG) 100 UNIT/ML solution pen-injector Inject 8 Units under the skin into the appropriate area as directed Daily With Breakfast, Lunch & Dinner. 10 mL 3   • lisinopril (PRINIVIL,ZESTRIL) 20 MG tablet lisinopril 20 mg tablet   TAKE 1 TABLET BY MOUTH DAILY     • NIFEdipine XL (PROCARDIA XL) 30 MG 24 hr tablet TAKE 2 TABLETS BY MOUTH EVERY  tablet 0   • omeprazole (priLOSEC) 40 MG capsule Take 1 capsule by mouth Daily. 90 capsule 2   • rOPINIRole (REQUIP) 0.25 MG tablet TAKE 1 TABLET BY MOUTH EVERY NIGHT 1 HOUR BEFORE BEDTIME 90 tablet 2   • sodium bicarbonate 650 MG tablet Take 1 tablet by mouth 3 (Three) Times a Day. 270 tablet 2   • sodium polystyrene (KAYEXALATE) 15 GM/60ML suspension SPS (with sorbitol) 15 gram-20 gram/60 mL oral suspension     • tamsulosin (FLOMAX) 0.4 MG capsule 24 hr capsule TAKE 1 CAPSULE BY MOUTH DAILY 90 capsule 2     No current facility-administered medications for this visit.     Review of Systems   Constitutional: Negative.    Skin:        Painful toenails   Neurological: Positive for numbness.   All other systems reviewed and are negative.      OBJECTIVE     Vitals:    09/19/22 1450   BP: 149/75   Pulse: 80   Temp: 97.3 °F (36.3 °C)   SpO2: 100%       Body mass index is 25.09 kg/m².    Lab Results   Component Value Date    HGBA1C 9.80 (H) 07/21/2022       Lab Results   Component Value Date    GLUCOSE 461 (C) 07/21/2022    CALCIUM 9.7 07/21/2022     (L) 07/21/2022    K 4.1 07/21/2022    CO2 20.0 (L) 07/21/2022    CL 91  (L) 07/21/2022    BUN 63 (H) 07/21/2022    CREATININE 3.49 (H) 07/21/2022    EGFRIFNONA 26 (L) 07/01/2021    BCR 18.1 07/21/2022    ANIONGAP 16.0 (H) 07/21/2022       Patient seen in no apparent distress.      PHYSICAL EXAM:     Foot/Ankle Exam:       General:   Diabetic Foot Exam Performed    Appearance comment:  Chronically ill  Orientation: AAOx3    Affect: appropriate    Gait: unimpaired    Shoe Gear:  Casual shoes    VASCULAR      Right Foot Vascularity   Normal vascular exam    Dorsalis pedis:  2+  Posterior tibial:  2+  Skin Temperature: warm    Edema Grading:  None  CFT:  < 3 seconds  Pedal Hair Growth:  Present  Varicosities: none       Left Foot Vascularity   Normal vascular exam    Dorsalis pedis:  2+  Posterior tibial:  2+  Skin Temperature: warm    Edema Grading:  None  CFT:  < 3 seconds  Pedal Hair Growth:  Present  Varicosities: none        NEUROLOGIC     Right Foot Neurologic   Light touch sensation:  Absent  Vibratory sensation:  Absent  Hot/Cold sensation: absent    Protective Sensation using [place] Monofilament:  0     Left Foot Neurologic   Light touch sensation:  Absent  Vibratory sensation:  Absent  Hot/cold sensation: absent    Protective Sensation using [place] Monofilament:  0     MUSCULOSKELETAL      Right Foot Musculoskeletal    Amputation   Right toes amputated: Yes    Toes amputated: first toe  [place]amm[place]oe:  Second toe, third toe, fourth toe and fifth toe     Left Foot Musculoskeletal    Amputation   Left toes amputated: Yes    Toes amputated: first toe  [place]amm[place]oe:  Second toe, third toe, fourth toe and fifth toe     MUSCLE STRENGTH     Right Foot Muscle Strength   Foot dorsiflexion:  4-  Foot plantar flexion:  4-  Foot inversion:  4-  Foot eversion:  4-     Left Foot Muscle Strength   Foot dorsiflexion:  4-  Foot plantar flexion:  4-  Foot inversion:  4-  Foot eversion:  4-     RANGE OF MOTION      Right Foot Range of Motion   Foot and ankle ROM within normal limits        Left Foot Range of Motion   Foot and ankle ROM within normal limits       DERMATOLOGIC     Right Foot Dermatologic   Skin: skin intact    Nails: onychomycosis, abnormally thick, subungual debris and dystrophic nails    Nails comment:  Toenails 2, 3, 4, and 5     Left Foot Dermatologic   Skin: skin intact    Nails: onychomycosis, abnormally thick, subungual debris, dystrophic nails and ingrown toenail    Nails comment:  Toenails 2, 3, 4, and 5      Diabetic Foot Exam Performed      ASSESSMENT/PLAN     Diagnoses and all orders for this visit:    1. Onychomycosis (Primary)    2. Onychocryptosis    3. Foot pain, bilateral    4. Amputated toe, unspecified laterality (HCC)    5. Neuropathy    6. Type 2 diabetes mellitus with diabetic polyneuropathy, with long-term current use of insulin (HCC)    Prescriptions written for diabetic shoes.    Comprehensive lower extremity examination and evaluation was performed.    Discussed findings and treatment plan including risks, benefits, and treatment options with patient in detail. Patient agreed with treatment plan.    Medications and allergies reviewed.  Reviewed available blood glucose and HgB A1C lab values along with other pertinent labs.  These were discussed with the patient as to their importance of diabetic maintenance.    Toenails 2, 3, 4, 5 on Right and 2, 3, 4, 5 on Left were debrided with nail nippers then filed with a Davemel nail karla.  Patient tolerated procedure well without complications.    Diabetic foot exam performed and documented this date, compliant with CQM required standards. Detail of findings as noted in physical exam.  Lower extremity Neurologic exam for diabetic patient performed and documented this date, compliant with PQRS required standards. Detail of findings as noted in physical exam.  Advised patient importance of good routine lower extremity hygiene. Advised patient importance of evaluating for intact skin and pain free nail borders.   Advised patient to use mirror to evaluate plantar/ soles of feet for better visualization. Advised patient monitor and phone office to be seen if any cracking to skin, open lesions, painful nail borders or if nails become elongated prior to next visit. Advised patient importance of daily cleansing of lower extremities, followed by good skin cream to maintain normal hydration of skin. Also advised patient importance of close daily monitoring of blood sugar. Advised to regulate diet and medications to maintain control of blood sugar in optimal range. Contact primary care provider if difficulties maintaining blood sugar levels.  Advised Patient of presence of Diabetes Mellitus condition.  Advised Patient risk of progression and worsening or improvement, then return of condition.  Will monitor condition for any change in future. Treat with most appropriate treatment pending status of condition.  Counseled and advised patient extensively on nature and ramifications of diabetes. Standard instructions given to patient for good diabetic foot care and maintenance. Advised importance of careful monitoring to avoid break down and complications secondary to diabetes. Advised patient importance of strict maintenance of blood sugar control. Advised patient of possible ominous results from neglect of condition, i.e.: amputation/ loss of digits, feet and legs, or even death.  Patient states understands counseling, will monitor closely, continue good hygiene and routine diabetic foot care. Patient will contact office should they have any questions or problems.      An After Visit Summary was printed and given to the patient at discharge, including (if requested) any available informative/educational handouts regarding diagnosis, treatment, or medications. All questions were answered to patient/family satisfaction. Should symptoms fail to improve or worsen they agree to call or return to clinic or to go to the Emergency Department.  Discussed the importance of following up with any needed screening tests/labs/specialist appointments and any requested follow-up recommended by me today. Importance of maintaining follow-up discussed and patient accepts that missed appointments can delay diagnosis and potentially lead to worsening of conditions.    Return in about 9 weeks (around 11/21/2022) for Toenail Care., or sooner if acute issues arise.    This document has been electronically signed by Chai Farias DPM on September 19, 2022 15:45 EDT

## 2022-09-23 ENCOUNTER — PATIENT OUTREACH (OUTPATIENT)
Dept: CASE MANAGEMENT | Facility: OTHER | Age: 52
End: 2022-09-23

## 2022-09-23 DIAGNOSIS — E11.3513 TYPE 2 DIABETES MELLITUS WITH BOTH EYES AFFECTED BY PROLIFERATIVE RETINOPATHY AND MACULAR EDEMA, WITH LONG-TERM CURRENT USE OF INSULIN: ICD-10-CM

## 2022-09-23 DIAGNOSIS — I10 ESSENTIAL HYPERTENSION: Primary | ICD-10-CM

## 2022-09-23 DIAGNOSIS — Z79.4 TYPE 2 DIABETES MELLITUS WITH BOTH EYES AFFECTED BY PROLIFERATIVE RETINOPATHY AND MACULAR EDEMA, WITH LONG-TERM CURRENT USE OF INSULIN: ICD-10-CM

## 2022-09-23 PROCEDURE — 99490 CHRNC CARE MGMT STAFF 1ST 20: CPT | Performed by: STUDENT IN AN ORGANIZED HEALTH CARE EDUCATION/TRAINING PROGRAM

## 2022-09-23 RX ORDER — DORZOLAMIDE HYDROCHLORIDE AND TIMOLOL MALEATE 20; 5 MG/ML; MG/ML
1 SOLUTION/ DROPS OPHTHALMIC 2 TIMES DAILY
Qty: 10 ML | Refills: 3 | Status: SHIPPED | OUTPATIENT
Start: 2022-09-23

## 2022-09-23 RX ORDER — ASPIRIN 81 MG/1
81 TABLET ORAL DAILY
Qty: 90 TABLET | Refills: 1 | Status: SHIPPED | OUTPATIENT
Start: 2022-09-23

## 2022-09-23 NOTE — OUTREACH NOTE
AMBULATORY CASE MANAGEMENT NOTE    Name and Relationship of Patient/Support Person:  -     CCM Interim Update        I called the patient and he stated that he was in need of med refills , request will be sent to provider. ( provider aware) . Patient stated that he has a hard time controlling his diabetes because of non compliance. He stated that because of his inability to purchase the right foods he does not eat right and eats a lot of high carb foods. I suggested that he follow up with MSW and he agreed. ( MSW order placed and sent to MSW)  Patient stated that his blood sugar reached over  at times . He also stated that he does not check as regular as he should. He could not provide a reason why. I asked if he had seen DM management and he stated that he has had appointment but because of transportation issues the appointment was missed and not rescheduled. I asked if he would like to reschedule and he agreed. We discussed the patients care gaps completely and patient decline all ( charted accordingly ) . Care plan was reviewed and updated. All questions were answered patient has non emergent contact number.       Education Documentation  No documentation found.        JULES HUIZAR  Ambulatory Case Management    9/23/2022, 14:37 EDT

## 2022-09-27 ENCOUNTER — PATIENT OUTREACH (OUTPATIENT)
Dept: CASE MANAGEMENT | Facility: OTHER | Age: 52
End: 2022-09-27

## 2022-09-27 NOTE — OUTREACH NOTE
Social Work Assessment  Questions/Answers    Flowsheet Row Most Recent Value   Referral Source nursing   Reason for Consult community resources, financial concerns   People in Home friend(s)   Current Living Arrangements home   Source of Income none   Financial/Environmental Concerns unable to afford food   Application for Public Assistance pending public assistance pending number        SDOH updated and reviewed with the patient during this program:  Financial Resource Strain: Medium Risk   • Difficulty of Paying Living Expenses: Somewhat hard      Food Insecurity: Food Insecurity Present   • Worried About Running Out of Food in the Last Year: Sometimes true   • Ran Out of Food in the Last Year: Sometimes true      Transportation Needs: No Transportation Needs   • Lack of Transportation (Medical): No   • Lack of Transportation (Non-Medical): No      Housing Stability: Low Risk    • Unable to Pay for Housing in the Last Year: No   • Number of Places Lived in the Last Year: 1   • Unstable Housing in the Last Year: No      Continuing Care   [facility]    [address] [facility] [address]    Phone: [phone]    Resource for: Financial Resource Strain   [facility]    [address] [facility] [address] [address]    Phone: [phone]    Resource for: Financial Resource Strain, Food Insecurity   [facility]    [address] [facility] [address] [address]    Phone: [phone]    Resource for: Transportation Needs       Patient Outreach    MSW Spoke with patient to provide food resources. Patient utilizes [employer], [employer], and also [facility] that MSW previously sent to patient. Patient is currently awaiting a disability hearing that will occur in December. Patient inquired about a stimulus check that could assist him financially. MSW unaware of any additional stimulus checks. Patient states that he did not qualify for the  stimulus checks as he has not worked in 5 years. Patient has no other needs at this time, but has MSW contact information if needed.    ROSA TAYLOR -   Ambulatory Case Management    9/27/2022, 10:35 EDT

## 2022-09-30 ENCOUNTER — PATIENT OUTREACH (OUTPATIENT)
Dept: CASE MANAGEMENT | Facility: OTHER | Age: 52
End: 2022-09-30

## 2022-09-30 DIAGNOSIS — Z79.4 TYPE 2 DIABETES MELLITUS WITH BOTH EYES AFFECTED BY PROLIFERATIVE RETINOPATHY AND MACULAR EDEMA, WITH LONG-TERM CURRENT USE OF INSULIN: ICD-10-CM

## 2022-09-30 DIAGNOSIS — I10 ESSENTIAL HYPERTENSION: Primary | ICD-10-CM

## 2022-09-30 DIAGNOSIS — E11.3513 TYPE 2 DIABETES MELLITUS WITH BOTH EYES AFFECTED BY PROLIFERATIVE RETINOPATHY AND MACULAR EDEMA, WITH LONG-TERM CURRENT USE OF INSULIN: ICD-10-CM

## 2022-09-30 NOTE — OUTREACH NOTE
[facility] End of Month Documentation    This Chronic Medical Management Care Plan for Rogelio Dee, 52 y.o. male, has been reviewed; established and a new plan of care implemented for the month of September.  A cumulative time of 30  minutes was spent on this patient record this month, including chart review; phone call with patient; phone call with other providers; face to face with provider; electronic communication with primary care provider.    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, bilateral; Stage 4 CKD; Blindness of left eye; Diabetic neuropathy; Suicidal ideation; Depression; Housing situation unstable; Smoker; Chronic diastolic CHF; Essential hypertension; Hyperlipemia, mixed; Chronic pain disorder; Combined forms of age-related cataract, bilateral; Displacement of lumbar intervertebral disc without myelopathy; Ocular hypertension, bilateral; Cigarette nicotine dependence without complication; History of suicidal ideation; Overweight (BMI 25.0-29.9); Food insecurity; and Lumbar spondylosis on their problem list., the following items were addressed: medical records; medications and any changes can be found within the plan section of the note.  A detailed listing of time spent for chronic care management is tracked within each outreach encounter.  Current medications include:  has a current medication list which includes the following prescription(s): admelog, amlodipine, aspirin, atorvastatin, carvedilol, cholecalciferol, citalopram, clonidine, cyclobenzaprine, dorzolamide-timolol, ferrous sulfate, furosemide, hydralazine, hydrocodone-acetaminophen, insulin degludec, insulin lispro (1 unit dial), lisinopril, nifedipine xl, omeprazole, ropinirole, sodium bicarbonate, sodium polystyrene, and tamsulosin. and the patient is reported to be patient is noncompliant with medication protocol,  Medications are reported to be non-effective in controlling symptoms  and changes have been made to the medication protocol.  All notes on chart for PCP to review.    The patient was monitored remotely for blood glucose; blood pressure.    The patient's physical needs include:  help taking medications as prescribed; medication education; needs assistance with ADLs.     The patient's mental support needs include:  increased support    The patient's cognitive support needs include:  not applicable    The patient's psychosocial support needs include:  need for increased support    The patient's functional needs include: needs met    The patient's environmental needs include:  not applicable, N/A    Care Plan overall comments:  Care plan updated,    Refer to previous outreach notes for more information on the areas listed above.    Monthly Billing Diagnoses  (I10) Essential hypertension    (E11.3513,  Z79.4) Type 2 diabetes mellitus with both eyes affected by proliferative retinopathy and macular edema, with long-term current use of insulin (HCC)    Medications   · Medications have been reconciled    Care Plan progress this month:      Recently Modified Care Plans Updates made since 8/30/2022 12:00 AM     Diabetes Type 2 (Adult)         Problem Priority Last Modified     ELS GLYCEMIC MANAGEMENT (DIABETES, TYPE 2) (ADULT) --  4/22/2022 12:35 PM by Connor Davis MA              Goal Recent Progress Last Modified     Glycemic Management Optimized --  4/22/2022 12:35 PM by Connor Davis MA     Evidence-based guidance:   Anticipate A1C testing (point-of-care) every 3 to 6 months based on goal attainment.   Review mutually-set A1C goal or target range.   Anticipate use of antihyperglycemic with or without insulin and periodic adjustments; consider active involvement of pharmacist.   Provide medical nutrition therapy and development of individualized eating.   Compare self-reported symptoms of hypo or hyperglycemia to blood glucose levels, diet and fluid intake, current medications,  psychosocial and physiologic stressors, change in activity and barriers to care adherence.   Promote self-monitoring of blood glucose levels.   Assess and address barriers to management plan, such as food insecurity, age, developmental ability, depression, anxiety, fear of hypoglycemia or weight gain, as well as medication cost, side effects and complicated regimen.   Consider referral to community-based diabetes education program, visiting nurse, community health worker or health .   Encourage regular dental care for treatment of periodontal disease; refer to dental provider when needed.    Notes:            Task Due Date Last Modified     Alleviate Barriers to Glycemic Management --  9/23/2022  2:06 PM by Connor Davis MA     Care Management Activities:      - A1C testing facilitated  - barriers to adherence to treatment plan identified  - blood glucose monitoring encouraged  - blood glucose readings reviewed  - encourage participation in diabetes education classes  - individualized medical nutrition therapy provided  - mutual A1C goal set or reviewed  - resources required to improve adherence to care identified  - self-awareness of signs/symptoms of hypo or hyperglycemia encouraged  - use of blood glucose monitoring log promoted      Notes:              Problem Priority Last Modified     ELS DISEASE PROGRESSION (DIABETES, TYPE 2) (ADULT) --  4/22/2022 12:35 PM by Connor Davis MA              Goal Recent Progress Last Modified     Disease Progression Prevented or Minimized --  4/22/2022 12:35 PM by Connor Davis MA     Evidence-based guidance:   Prepare patient for laboratory and diagnostic exams based on risk and presentation.   Encourage lifestyle changes, such as increased intake of plant-based foods, stress reduction, consistent physical activity and smoking cessation to prevent long-term complications and chronic disease.    Individualize activity and exercise recommendations while considering  potential limitations, such as neuropathy, retinopathy or the ability to prevent hyperglycemia or hypoglycemia.    Prepare patient for use of pharmacologic therapy that may include antihypertensive, analgesic, prostaglandin E1 with periodic adjustments, based on presenting chronic condition and laboratory results.   Assess signs/symptoms and risk factors for hypertension, sleep-disordered breathing, neuropathy (including changes in gait and balance), retinopathy, nephropathy and sexual dysfunction.   Address pregnancy planning and contraceptive choice, especially when prescribing antihypertensive or statin.   Ensure completion of annual comprehensive foot exam and dilated eye exam.    Implement additional individualized goals and interventions based on identified risk factors.   Prepare patient for consultation or referral for specialist care, such as ophthalmology, neurology, cardiology, podiatry, nephrology or perinatology.    Notes:            Task Due Date Last Modified     Monitor and Manage Follow-up for Comorbidities --  9/23/2022  2:07 PM by Connor Davis MA     Care Management Activities:      - activity based on tolerance and functional limitations encouraged  - healthy lifestyle promoted  - response to pharmacologic therapy monitored  - vital signs and trends reviewed      Notes:                      · Current Specialty Plan of Care Status in process    Instructions   · Patient was provided an electronic copy of care plan  · CCM services were explained and offered and patient has accepted these services.  · Patient has given their written consent to receive CCM services and understands that this includes the authorization of electronic communication of medical information with the other treating providers.  · Patient understands that they may stop CCM services at any time and these changes will be effective at the end of the calendar month and will effectively revocate the agreement of CCM  services.  · Patient understands that only one practitioner can furnish and be paid for CCM services during one calendar month.  Patient also understands that there may be co-payment or deductible fees in association with CCM services.  · Patient will continue with at least monthly follow-up calls with the Nurse Navigator.    JULES HUIZAR  Ambulatory Case Management    9/30/2022, 12:11 EDT

## 2022-10-11 ENCOUNTER — TELEPHONE (OUTPATIENT)
Dept: CASE MANAGEMENT | Facility: OTHER | Age: 52
End: 2022-10-11

## 2022-10-11 NOTE — TELEPHONE ENCOUNTER
MSW has been in contact in reference to food assistance per chart review. I reached out to the patient and left a  for return call.

## 2022-10-12 ENCOUNTER — TELEPHONE (OUTPATIENT)
Dept: CASE MANAGEMENT | Facility: OTHER | Age: 52
End: 2022-10-12

## 2022-10-14 ENCOUNTER — PATIENT OUTREACH (OUTPATIENT)
Dept: CASE MANAGEMENT | Facility: OTHER | Age: 52
End: 2022-10-14

## 2022-10-14 DIAGNOSIS — Z79.4 TYPE 2 DIABETES MELLITUS WITH BOTH EYES AFFECTED BY PROLIFERATIVE RETINOPATHY AND MACULAR EDEMA, WITH LONG-TERM CURRENT USE OF INSULIN: ICD-10-CM

## 2022-10-14 DIAGNOSIS — E11.3513 TYPE 2 DIABETES MELLITUS WITH BOTH EYES AFFECTED BY PROLIFERATIVE RETINOPATHY AND MACULAR EDEMA, WITH LONG-TERM CURRENT USE OF INSULIN: ICD-10-CM

## 2022-10-14 DIAGNOSIS — I10 ESSENTIAL HYPERTENSION: Primary | ICD-10-CM

## 2022-10-14 PROCEDURE — 99490 CHRNC CARE MGMT STAFF 1ST 20: CPT | Performed by: STUDENT IN AN ORGANIZED HEALTH CARE EDUCATION/TRAINING PROGRAM

## 2022-10-14 NOTE — OUTREACH NOTE
AMBULATORY CASE MANAGEMENT NOTE    Name and Relationship of Patient/Support Person: Luiz Rogelio - Self  Self    Adult Patient Profile  Questions/Answers    Flowsheet Row Most Recent Value   Diabetes Management Strategies medication therapy, routine screenings, weight management   Diabetes Self-Management Outcome 3 (uncertain)   How Well Do You Speak English? very well   Source of Information patient   Patient Aware of Diagnosis yes   Admission in Past 90 Days none   Wear Glasses or Blind yes   Healing Pattern slow   Current Living Arrangements home   Resource/Environmental Concerns financial   Financial Concerns unemployed, food, unable to afford        Social Work Assessment  Questions/Answers    Flowsheet Row Most Recent Value   Current Living Arrangements home        SDOH updated and reviewed with the patient during this program:  Food Insecurity: Food Insecurity Present   • Worried About Running Out of Food in the Last Year: Sometimes true   • Ran Out of Food in the Last Year: Sometimes true      CCM Interim Update  I outreached to patient . He stated that he was still dealing with pain in feet and legs 4 out of 10. Stated that MSW had reached out to him but he missed call. Stated he does have call back number and will reach out to her for assistance with food instability . PT stated that at the moment he currently has ample food supplies. SDOH addressed. Meds addressed no refills, BP stated to be a little elevated patient denies checking BP at home. Blood sugar reported to be 200 and stable at that level. Pt aware to reduce Blood sugar . Daily checks and logging emphasized. Patient stated some depression stated no thoughts of hurting others or himself stated controled with meds. Patient stated no other needs.               Education Documentation  No documentation found.        JULES HUIZAR  Ambulatory Case Management    10/14/2022, 09:39 EDT

## 2022-10-21 ENCOUNTER — OFFICE VISIT (OUTPATIENT)
Dept: INTERNAL MEDICINE | Facility: CLINIC | Age: 52
End: 2022-10-21

## 2022-10-21 VITALS
HEART RATE: 75 BPM | TEMPERATURE: 98.4 F | DIASTOLIC BLOOD PRESSURE: 82 MMHG | WEIGHT: 171.6 LBS | BODY MASS INDEX: 26.01 KG/M2 | OXYGEN SATURATION: 95 % | SYSTOLIC BLOOD PRESSURE: 144 MMHG | HEIGHT: 68 IN

## 2022-10-21 DIAGNOSIS — Z59.41 FOOD INSECURITY: ICD-10-CM

## 2022-10-21 DIAGNOSIS — F17.210 CIGARETTE NICOTINE DEPENDENCE WITHOUT COMPLICATION: ICD-10-CM

## 2022-10-21 DIAGNOSIS — H54.40 BLINDNESS OF LEFT EYE WITH NORMAL VISION IN CONTRALATERAL EYE: ICD-10-CM

## 2022-10-21 DIAGNOSIS — E11.3513 TYPE 2 DIABETES MELLITUS WITH BOTH EYES AFFECTED BY PROLIFERATIVE RETINOPATHY AND MACULAR EDEMA, WITH LONG-TERM CURRENT USE OF INSULIN: ICD-10-CM

## 2022-10-21 DIAGNOSIS — N18.4 STAGE 4 CHRONIC KIDNEY DISEASE: Primary | ICD-10-CM

## 2022-10-21 DIAGNOSIS — I10 ESSENTIAL HYPERTENSION: ICD-10-CM

## 2022-10-21 DIAGNOSIS — E87.20 METABOLIC ACIDOSIS: ICD-10-CM

## 2022-10-21 DIAGNOSIS — R07.9 CHEST PAIN, UNSPECIFIED TYPE: ICD-10-CM

## 2022-10-21 DIAGNOSIS — Z79.4 TYPE 2 DIABETES MELLITUS WITH BOTH EYES AFFECTED BY PROLIFERATIVE RETINOPATHY AND MACULAR EDEMA, WITH LONG-TERM CURRENT USE OF INSULIN: ICD-10-CM

## 2022-10-21 DIAGNOSIS — H04.201 WATERING OF RIGHT EYE: ICD-10-CM

## 2022-10-21 DIAGNOSIS — I50.32 CHRONIC DIASTOLIC (CONGESTIVE) HEART FAILURE: ICD-10-CM

## 2022-10-21 PROCEDURE — 83036 HEMOGLOBIN GLYCOSYLATED A1C: CPT | Performed by: STUDENT IN AN ORGANIZED HEALTH CARE EDUCATION/TRAINING PROGRAM

## 2022-10-21 PROCEDURE — 80048 BASIC METABOLIC PNL TOTAL CA: CPT | Performed by: STUDENT IN AN ORGANIZED HEALTH CARE EDUCATION/TRAINING PROGRAM

## 2022-10-21 PROCEDURE — 99214 OFFICE O/P EST MOD 30 MIN: CPT | Performed by: STUDENT IN AN ORGANIZED HEALTH CARE EDUCATION/TRAINING PROGRAM

## 2022-10-21 RX ORDER — CETIRIZINE HYDROCHLORIDE 5 MG/1
5 TABLET ORAL DAILY
Qty: 90 TABLET | Refills: 3 | Status: SHIPPED | OUTPATIENT
Start: 2022-10-21

## 2022-10-21 SDOH — ECONOMIC STABILITY - FOOD INSECURITY: FOOD INSECURITY: Z59.41

## 2022-10-21 NOTE — PROGRESS NOTES
Chief Complaint  Diabetes (3 mo f/u) and watery eyes (Right eye consistently water)    Subjective          Rogelio Dee presents to [facility]  History of Present Illness    Seeing Dr. Hutson in 2 weeks.  Reports that he is still reliant on food genao.  He reports he is compliant with a low sodium diet.    He reports checking his blood pressures off and on.  In the last 2 weeks, reports has been running systolic 190s to 200s over 70s to 90s.    He reports he frequently forgets his third dose of hydralazine, but is otherwise compliant with his medicines.    He reports checking his blood sugar twice daily, and in the last two weeks, running 180-185.    He reports have chest pain on two occasions since last visit with activity.  He reports that on one notable occasion had chest pain while moving stuff around in the garage.    I recommended stress test, and he refused a stress test at this time.    He last saw his cardiologist about 6 months ago.    Still smoking 10 cigarettes a day.    He denies SI.    Current Outpatient Medications   Medication Instructions   • Admelog 100 UNIT/ML injection ADMINISTER 15 UNITS UNDER THE SKIN BEFORE A MEAL AS NEEDED PER SLIDING SCALE   • amLODIPine (NORVASC) 10 MG tablet amlodipine 10 mg tablet   • aspirin 81 mg, Oral, Daily   • atorvastatin (LIPITOR) 20 mg, Oral, Daily   • carvedilol (COREG) 25 MG tablet TAKE 1 TABLET BY MOUTH TWICE DAILY WITH MEALS   • cetirizine (ZYRTEC) 5 mg, Oral, Daily   • Cholecalciferol 2,000 Units, Oral, Daily   • citalopram (CELEXA) 40 mg, Oral, Daily   • cloNIDine (CATAPRES) 0.1 mg, Oral, 2 Times Daily   • cyclobenzaprine (FLEXERIL) 10 MG tablet Every 8 Hours Scheduled   • dorzolamide-timolol (COSOPT) 22.3-6.8 MG/ML ophthalmic solution 1 drop, Both Eyes, 2 Times Daily   • ferrous sulfate 325 (65 FE) MG tablet FeroSul 325 mg (65 mg iron) tablet   TAKE 1 TABLET BY MOUTH TWICE DAILY   • furosemide (LASIX) 40 mg,  "Oral, Daily   • hydrALAZINE (APRESOLINE) 100 mg, Oral, 3 Times Daily   • HYDROcodone-acetaminophen (NORCO) 7.5-325 MG per tablet Every 6 Hours   • insulin degludec (TRESIBA FLEXTOUCH) 15 Units, Subcutaneous, Daily   • Insulin Lispro (1 Unit Dial) (HUMALOG) 8 Units, Subcutaneous, Daily With Breakfast, Lunch & Dinner   • lisinopril (PRINIVIL,ZESTRIL) 20 MG tablet lisinopril 20 mg tablet   TAKE 1 TABLET BY MOUTH DAILY   • NIFEdipine XL (PROCARDIA XL) 30 MG 24 hr tablet TAKE 2 TABLETS BY MOUTH EVERY DAY   • omeprazole (PRILOSEC) 40 mg, Oral, Daily   • rOPINIRole (REQUIP) 0.25 MG tablet TAKE 1 TABLET BY MOUTH EVERY NIGHT 1 HOUR BEFORE BEDTIME   • sodium bicarbonate 650 mg, Oral, 3 Times Daily   • sodium polystyrene (KAYEXALATE) 15 GM/60ML suspension SPS (with sorbitol) 15 gram-20 gram/60 mL oral suspension   • tamsulosin (FLOMAX) 0.4 mg, Oral, Daily       The following portions of the patient's history were reviewed and updated as appropriate: allergies, current medications, past family history, past medical history, past social history, past surgical history, and problem list.    Objective   Vital Signs:   /82   Pulse 75   Temp 98.4 °F (36.9 °C) (Temporal)   Ht 172.7 cm (68\")   Wt 77.8 kg (171 lb 9.6 oz)   SpO2 95%   BMI 26.09 kg/m²     Wt Readings from Last 3 Encounters:   10/21/22 77.8 kg (171 lb 9.6 oz)   09/19/22 74.8 kg (165 lb)   07/21/22 77.5 kg (170 lb 12.8 oz)     BP Readings from Last 3 Encounters:   10/21/22 144/82   09/19/22 149/75   07/21/22 151/97     Physical Exam  Vitals reviewed.   Constitutional:       General: He is not in acute distress.     Appearance: Normal appearance. He is not ill-appearing, toxic-appearing or diaphoretic.   HENT:      Head: Normocephalic and atraumatic.   Eyes:      Conjunctiva/sclera: Conjunctivae normal.   Cardiovascular:      Rate and Rhythm: Normal rate and regular rhythm.      Pulses: Normal pulses.      Heart sounds: Normal heart sounds. No murmur " heard.  Pulmonary:      Effort: Pulmonary effort is normal. No respiratory distress.      Breath sounds: Normal breath sounds. No stridor. No wheezing, rhonchi or rales.   Chest:      Chest wall: No tenderness.   Abdominal:      General: Abdomen is flat.      Palpations: Abdomen is soft. There is no mass.      Tenderness: There is no abdominal tenderness.   Musculoskeletal:      Right lower leg: No edema.      Left lower leg: No edema.   Skin:     General: Skin is warm and dry.   Neurological:      Mental Status: He is alert. Mental status is at baseline.   Psychiatric:         Thought Content: Thought content normal.         Judgment: Judgment normal.       Result Review :   The following data was reviewed by: Simeon Lomeli MD on 10/21/2022:  Common labs    Common Labs 4/21/22 4/21/22 4/21/22 4/21/22 4/21/22 7/21/22 7/21/22 7/21/22    1243 1243 1243 1243 1243 1622 1622 1622   Glucose     339 (A)   461 (A)   BUN     69 (A)   63 (A)   Creatinine     3.18 (A)   3.49 (A)   Sodium     129 (A)   127 (A)   Potassium     4.1   4.1   Chloride     92 (A)   91 (A)   Calcium     9.4   9.7   Albumin     4.70   4.30   Total Bilirubin     0.2   0.3   Alkaline Phosphatase     126 (A)   116   AST (SGOT)     11   8   ALT (SGPT)     13   11   WBC 10.61     9.20     Hemoglobin 11.1 (A)     10.9 (A)     Hematocrit 33.9 (A)     32.6 (A)     Platelets 340     352     Total Cholesterol    133       Triglycerides    195 (A)       HDL Cholesterol    30 (A)       LDL Cholesterol     70       Hemoglobin A1C   10.50 (A)    9.80 (A)    Microalbumin, Urine  84.7         (A) Abnormal value                   Lab Results   Component Value Date    INR 0.91 (L) 09/02/2020    BILIRUBINUR Negative 07/01/2021       Procedures        Assessment and Plan    Diagnoses and all orders for this visit:    1. Stage 4 chronic kidney disease (HCC) (Primary)  -     Hemoglobin A1c    2. Type 2 diabetes mellitus with both eyes affected by proliferative  retinopathy and macular edema, with long-term current use of insulin (HCC)  -     Hemoglobin A1c  -     Basic metabolic panel    3. Metabolic acidosis    4. Chronic diastolic (congestive) heart failure (HCC)    5. Blindness of left eye with normal vision in contralateral eye    6. Food insecurity    7. Cigarette nicotine dependence without complication  Comments:  -counseled on importance of smoking cessation  -he reports he is not interested in quitting    8. Chest pain, unspecified type  Comments:  -I strongly recommended stress test at clinic today, counseling him that his history is highly concerning for cardiovascular etiology  -he declines stress test    9. Watering of right eye  -     cetirizine (zyrTEC) 5 MG tablet; Take 1 tablet by mouth Daily.  Dispense: 90 tablet; Refill: 3    10. Essential hypertension  Comments:  -uncontrolled  -I stressed importance of med compliance  -follows with nephro          There are no discontinued medications.       Follow Up {Instructions Charge Capture  Follow-up Communications :23}  Return in about 3 months (around 1/21/2023) for CKD, HTN.  Patient was given instructions and counseling regarding his condition or for health maintenance advice. Please see specific information pulled into the AVS if appropriate.       Simeon Lomeli MD  10/21/22  18:15 EDT               none

## 2022-10-22 DIAGNOSIS — Z79.4 TYPE 2 DIABETES MELLITUS WITH BOTH EYES AFFECTED BY PROLIFERATIVE RETINOPATHY AND MACULAR EDEMA, WITH LONG-TERM CURRENT USE OF INSULIN: ICD-10-CM

## 2022-10-22 DIAGNOSIS — E11.3513 TYPE 2 DIABETES MELLITUS WITH BOTH EYES AFFECTED BY PROLIFERATIVE RETINOPATHY AND MACULAR EDEMA, WITH LONG-TERM CURRENT USE OF INSULIN: ICD-10-CM

## 2022-10-22 LAB
ANION GAP SERPL CALCULATED.3IONS-SCNC: 15 MMOL/L (ref 5–15)
BUN SERPL-MCNC: 72 MG/DL (ref 6–20)
BUN/CREAT SERPL: 20.8 (ref 7–25)
CALCIUM SPEC-SCNC: 9.3 MG/DL (ref 8.6–10.5)
CHLORIDE SERPL-SCNC: 95 MMOL/L (ref 98–107)
CO2 SERPL-SCNC: 21 MMOL/L (ref 22–29)
CREAT SERPL-MCNC: 3.46 MG/DL (ref 0.76–1.27)
EGFRCR SERPLBLD CKD-EPI 2021: 20.5 ML/MIN/1.73
GLUCOSE SERPL-MCNC: 248 MG/DL (ref 65–99)
HBA1C MFR BLD: 10 % (ref 4.8–5.6)
POTASSIUM SERPL-SCNC: 4.5 MMOL/L (ref 3.5–5.2)
SODIUM SERPL-SCNC: 131 MMOL/L (ref 136–145)

## 2022-10-24 ENCOUNTER — TELEPHONE (OUTPATIENT)
Dept: INTERNAL MEDICINE | Facility: CLINIC | Age: 52
End: 2022-10-24

## 2022-10-24 NOTE — TELEPHONE ENCOUNTER
----- Message from Simeon Lomeli MD sent at 10/22/2022  1:03 PM EDT -----  BMP notable for elevated blood sugar to 248.  Renal function is stable with a GFR of 20.  Acidosis is noted and stable.  Low serum sodium noted, which is also stable.  Hemoglobin A1c is 10% (our goal is less than 7%).    I would like to go up on your tresiba from 15 units to 20 units, and I have sent a new prescription.    I do strongly recommend that you contact Ira Larson's office to schedule with her.  The better we can control your blood sugar, the longer we will be able to keep your kidneys viable.

## 2022-10-24 NOTE — TELEPHONE ENCOUNTER
SPOKE WITH PATIENT. VERIFIED .     MADE AWARE OF RESULTS.   TOLD PATIENT HE NEEDED TO CONTACT GEOVANI'S OFFICE AND HE SAID ALRIGHT.     PATIENT IS ALSO AWARE OF MEDICATION CHANGES.

## 2022-10-31 ENCOUNTER — PATIENT OUTREACH (OUTPATIENT)
Dept: CASE MANAGEMENT | Facility: OTHER | Age: 52
End: 2022-10-31

## 2022-10-31 DIAGNOSIS — Z79.4 TYPE 2 DIABETES MELLITUS WITH BOTH EYES AFFECTED BY PROLIFERATIVE RETINOPATHY AND MACULAR EDEMA, WITH LONG-TERM CURRENT USE OF INSULIN: ICD-10-CM

## 2022-10-31 DIAGNOSIS — E11.3513 TYPE 2 DIABETES MELLITUS WITH BOTH EYES AFFECTED BY PROLIFERATIVE RETINOPATHY AND MACULAR EDEMA, WITH LONG-TERM CURRENT USE OF INSULIN: ICD-10-CM

## 2022-10-31 DIAGNOSIS — I10 ESSENTIAL HYPERTENSION: Primary | ICD-10-CM

## 2022-10-31 NOTE — OUTREACH NOTE
[facility] End of Month Documentation    This Chronic Medical Management Care Plan for Rogelio Dee, 52 y.o. male, has been reviewed; established and a new plan of care implemented for the month of October.  A cumulative time of 28  minutes was spent on this patient record this month, including chart review; phone call with patient.    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, bilateral; Stage 4 CKD; Blindness of left eye; Diabetic neuropathy; Suicidal ideation; Depression; Housing situation unstable; Smoker; Chronic diastolic CHF; Essential hypertension; Hyperlipemia, mixed; Chronic pain disorder; Combined forms of age-related cataract, bilateral; Displacement of lumbar intervertebral disc without myelopathy; Ocular hypertension, bilateral; Cigarette nicotine dependence without complication; History of suicidal ideation; Overweight (BMI 25.0-29.9); Food insecurity; and Lumbar spondylosis on their problem list., the following items were addressed: medical records; medications and any changes can be found within the plan section of the note.  A detailed listing of time spent for chronic care management is tracked within each outreach encounter.  Current medications include:  has a current medication list which includes the following prescription(s): admelog, amlodipine, aspirin, atorvastatin, carvedilol, cetirizine, cholecalciferol, citalopram, clonidine, cyclobenzaprine, dorzolamide-timolol, ferrous sulfate, furosemide, hydralazine, hydrocodone-acetaminophen, insulin degludec, insulin lispro (1 unit dial), lisinopril, nifedipine xl, omeprazole, ropinirole, sodium bicarbonate, sodium polystyrene, and tamsulosin. and the patient is reported to be patient is noncompliant with medication protocol,  Medications are reported to be non-effective in controlling symptoms and changes have been made to the medication protocol.  All notes on chart for PCP to review.    The  patient was monitored remotely for blood glucose; blood pressure.    The patient's physical needs include:  help taking medications as prescribed; medication education; needs assistance with ADLs.     The patient's mental support needs include:  increased support    The patient's cognitive support needs include:  not applicable    The patient's psychosocial support needs include:  need for increased support    The patient's functional needs include: needs met    The patient's environmental needs include:  not applicable, N/A    Care Plan overall comments:  Care plan updated,    Refer to previous outreach notes for more information on the areas listed above.    Monthly Billing Diagnoses  (I10) Essential hypertension    (E11.3513,  Z79.4) Type 2 diabetes mellitus with both eyes affected by proliferative retinopathy and macular edema, with long-term current use of insulin (HCC)    Medications   · Medications have been reconciled    Care Plan progress this month:      Recently Modified Care Plans Updates made since 9/30/2022 12:00 AM     Diabetes Type 2 (Adult)         Problem Priority Last Modified     ELS GLYCEMIC MANAGEMENT (DIABETES, TYPE 2) (ADULT) --  4/22/2022 12:35 PM by Connor Davis MA              Goal Recent Progress Last Modified     Glycemic Management Optimized --  4/22/2022 12:35 PM by Connor Davis MA     Evidence-based guidance:   Anticipate A1C testing (point-of-care) every 3 to 6 months based on goal attainment.   Review mutually-set A1C goal or target range.   Anticipate use of antihyperglycemic with or without insulin and periodic adjustments; consider active involvement of pharmacist.   Provide medical nutrition therapy and development of individualized eating.   Compare self-reported symptoms of hypo or hyperglycemia to blood glucose levels, diet and fluid intake, current medications, psychosocial and physiologic stressors, change in activity and barriers to care adherence.   Promote  self-monitoring of blood glucose levels.   Assess and address barriers to management plan, such as food insecurity, age, developmental ability, depression, anxiety, fear of hypoglycemia or weight gain, as well as medication cost, side effects and complicated regimen.   Consider referral to community-based diabetes education program, visiting nurse, community health worker or health .   Encourage regular dental care for treatment of periodontal disease; refer to dental provider when needed.    Notes:            Task Due Date Last Modified     Alleviate Barriers to Glycemic Management --  10/14/2022  9:37 AM by Connor Davis MA     Care Management Activities:      - A1C testing facilitated  - barriers to adherence to treatment plan identified  - blood glucose monitoring encouraged  - blood glucose readings reviewed  - encourage participation in diabetes education classes  - individualized medical nutrition therapy provided  - mutual A1C goal set or reviewed  - resources required to improve adherence to care identified  - self-awareness of signs/symptoms of hypo or hyperglycemia encouraged  - use of blood glucose monitoring log promoted      Notes:              Problem Priority Last Modified     ELS DISEASE PROGRESSION (DIABETES, TYPE 2) (ADULT) --  4/22/2022 12:35 PM by Connor Davis MA              Goal Recent Progress Last Modified     Disease Progression Prevented or Minimized --  4/22/2022 12:35 PM by Connor Davis MA     Evidence-based guidance:   Prepare patient for laboratory and diagnostic exams based on risk and presentation.   Encourage lifestyle changes, such as increased intake of plant-based foods, stress reduction, consistent physical activity and smoking cessation to prevent long-term complications and chronic disease.    Individualize activity and exercise recommendations while considering potential limitations, such as neuropathy, retinopathy or the ability to prevent hyperglycemia or  hypoglycemia.    Prepare patient for use of pharmacologic therapy that may include antihypertensive, analgesic, prostaglandin E1 with periodic adjustments, based on presenting chronic condition and laboratory results.   Assess signs/symptoms and risk factors for hypertension, sleep-disordered breathing, neuropathy (including changes in gait and balance), retinopathy, nephropathy and sexual dysfunction.   Address pregnancy planning and contraceptive choice, especially when prescribing antihypertensive or statin.   Ensure completion of annual comprehensive foot exam and dilated eye exam.    Implement additional individualized goals and interventions based on identified risk factors.   Prepare patient for consultation or referral for specialist care, such as ophthalmology, neurology, cardiology, podiatry, nephrology or perinatology.    Notes:            Task Due Date Last Modified     Monitor and Manage Follow-up for Comorbidities --  10/14/2022  9:37 AM by Connor Davis MA     Care Management Activities:      - activity based on tolerance and functional limitations encouraged  - healthy lifestyle promoted  - medication side effects managed  - response to pharmacologic therapy monitored  - signs/symptoms of comorbidities identified  - vital signs and trends reviewed      Notes:                 General Plan of Care (Adult)         Problem Priority Last Modified     ELS HEALTH PROMOTION OR DISEASE SELF-MANAGEMENT (GENERAL PLAN OF CARE) (ADULT) --  4/22/2022 12:35 PM by Connor Davis MA              Goal Recent Progress Last Modified     Self-Management Plan Developed --  4/22/2022 12:35 PM by Connor Davis MA     Evidence-based guidance:   Review biopsychosocial determinants of health screens.   Determine level of modifiable health risk.   Assess level of patient activation, level of readiness, importance and confidence to make changes.   Evoke change talk using open-ended questions, pros and cons, as well as looking  forward.   Identify areas where behavior change may lead to improved health.   Partner with patient to develop a robust self-management plan that includes lifestyle factors, such as weight loss, exercise and healthy nutrition, as well as goals specific to disease risks.   Support patient and family/caregiver active participation in decision-making and self-management plan.   Implement additional goals and interventions based on identified risk factors to reduce health risk.   Facilitate advance care planning.   Review need for preventive screening based on age, sex, family history and health history.    Notes:            Task Due Date Last Modified     Mutually Develop and Foster Achievement of Patient Goals --  10/14/2022  9:38 AM by Connor Davis MA     Care Management Activities:      - barriers to meeting goals identified  - choices provided  - collaboration with team encouraged  - decision-making supported  - difficulty of making life-long changes acknowledged  - health risks reviewed  - questions answered  - reassurance provided  - resources needed to meet goals identified  - self-reflection promoted  - self-reliance encouraged  - verbalization of feelings encouraged      Notes:                      · Current Specialty Plan of Care Status in process    Instructions   · Patient was provided an electronic copy of care plan  · CCM services were explained and offered and patient has accepted these services.  · Patient has given their written consent to receive CCM services and understands that this includes the authorization of electronic communication of medical information with the other treating providers.  · Patient understands that they may stop CCM services at any time and these changes will be effective at the end of the calendar month and will effectively revocate the agreement of CCM services.  · Patient understands that only one practitioner can furnish and be paid for CCM services during one calendar  month.  Patient also understands that there may be co-payment or deductible fees in association with CCM services.  · Patient will continue with at least monthly follow-up calls with the Ambulatory .    JULES HUIZAR  Ambulatory Case Management    10/31/2022, 15:50 EDT

## 2022-11-13 NOTE — PROGRESS NOTES
[facility]  Cardiology progress Note    Patient Name: Rogelio Dee  : 1970    CHIEF COMPLAINT  Hypertension        Subjective   Subjective     HISTORY OF PRESENT ILLNESS    Rogelio Dee is a 52 y.o. male with history of hypertension palpitations.  No chest pain or shortness of breath.    REVIEW OF SYSTEMS    Constitutional:    No fever, no weight loss  Skin:     No rash  Otolaryngeal:    No difficulty swallowing  Cardiovascular: See HPI.  Pulmonary:    No cough, no sputum production    Personal History     Social History:    reports that he has been smoking cigarettes. He has a 15.00 pack-year smoking history. He has never used smokeless tobacco. He reports that he does not drink alcohol and does not use drugs.    Home Medications:  Current Outpatient Medications on File Prior to Visit   Medication Sig   • Admelog 100 UNIT/ML injection ADMINISTER 15 UNITS UNDER THE SKIN BEFORE A MEAL AS NEEDED PER SLIDING SCALE   • amLODIPine (NORVASC) 10 MG tablet amlodipine 10 mg tablet   • aspirin 81 MG EC tablet Take 1 tablet by mouth Daily.   • atorvastatin (LIPITOR) 20 MG tablet Take 1 tablet by mouth Daily.   • carvedilol (COREG) 25 MG tablet TAKE 1 TABLET BY MOUTH TWICE DAILY WITH MEALS   • cetirizine (zyrTEC) 5 MG tablet Take 1 tablet by mouth Daily.   • Cholecalciferol 50 MCG (2000 UT) capsule Take 1 capsule by mouth Daily.   • citalopram (CeleXA) 40 MG tablet Take 1 tablet by mouth Daily.   • cloNIDine (CATAPRES) 0.1 MG tablet Take 1 tablet by mouth 2 (Two) Times a Day.   • cyclobenzaprine (FLEXERIL) 10 MG tablet Every 8 (Eight) Hours.   • dorzolamide-timolol (COSOPT) 22.3-6.8 MG/ML ophthalmic solution Administer 1 drop to both eyes 2 (Two) Times a Day.   • ferrous sulfate 325 (65 FE) MG tablet FeroSul 325 mg (65 mg iron) tablet   TAKE 1 TABLET BY MOUTH TWICE DAILY   • furosemide (LASIX) 40 MG tablet Take 1 tablet by mouth Daily.   • hydrALAZINE (APRESOLINE) 100 MG tablet Take 1 tablet by mouth  3 (Three) Times a Day.   • HYDROcodone-acetaminophen (NORCO) 7.5-325 MG per tablet Every 6 (Six) Hours.   • insulin degludec (TRESIBA FLEXTOUCH) 100 UNIT/ML solution pen-injector injection Inject 20 Units under the skin into the appropriate area as directed Daily.   • Insulin Lispro, 1 Unit Dial, (HUMALOG) 100 UNIT/ML solution pen-injector Inject 8 Units under the skin into the appropriate area as directed Daily With Breakfast, Lunch & Dinner.   • lisinopril (PRINIVIL,ZESTRIL) 20 MG tablet lisinopril 20 mg tablet   TAKE 1 TABLET BY MOUTH DAILY   • NIFEdipine XL (PROCARDIA XL) 30 MG 24 hr tablet TAKE 2 TABLETS BY MOUTH EVERY DAY   • omeprazole (priLOSEC) 40 MG capsule Take 1 capsule by mouth Daily.   • rOPINIRole (REQUIP) 0.25 MG tablet TAKE 1 TABLET BY MOUTH EVERY NIGHT 1 HOUR BEFORE BEDTIME   • sodium bicarbonate 650 MG tablet Take 1 tablet by mouth 3 (Three) Times a Day.   • sodium polystyrene (KAYEXALATE) 15 GM/60ML suspension SPS (with sorbitol) 15 gram-20 gram/60 mL oral suspension   • tamsulosin (FLOMAX) 0.4 MG capsule 24 hr capsule TAKE 1 CAPSULE BY MOUTH DAILY     No current facility-administered medications on file prior to visit.       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  Missed appt in [place] for PRP, partially because of ride issues and partially because he is hesitant to undergo PRP because it is uncomfortable.  Avastin today.  Discussed retrobulbar for PRP vs.  Treatment with anti-VEGF exclusively.  Pt will consider options.  If doing PRP, will try to get done in 1 session in order to minimize trips to [place].  PDR OS -       Allergies:  Allergies   Allergen Reactions   • Oxycodone Mental Status Change and Other (See Comments)       Objective    Objective       Vitals:   Heart Rate:  [80-86] 80  BP: (170-188)/(92-94) 188/92  Body mass index is 26.15 kg/m².     PHYSICAL EXAM:    General Appearance:   · well developed  · well nourished  HENT:   · oropharynx moist  · lips not cyanotic  Neck:  · thyroid not enlarged  · supple  Respiratory:  · no respiratory distress  · normal breath sounds  · no rales  Cardiovascular:  · no jugular venous distention  · regular rhythm  · apical impulse normal  · S1 normal, S2 normal  · no S3, no S4   · no murmur  · no rub, no thrill  · carotid pulses normal; no bruit  · pedal pulses normal  · lower extremity edema: none    Skin:   · warm, dry  Psychiatric:  · judgement and insight appropriate  · normal mood and affect        Result Review:  I have personally reviewed the available results from  [x]  Laboratory  [x]  EKG  [x]  Cardiology  [x]  Medications  [x]  Old records  []  Other:     Procedures  Lab Results   Component Value Date    CHOL 133 04/21/2022     Lab Results   Component Value Date    TRIG 195 (H) 04/21/2022    TRIG 74 04/05/2021    TRIG 122 03/26/2021     Lab Results   Component Value Date    HDL 30 (L) 04/21/2022    HDL 32 (L) 04/05/2021    HDL 33 (L) 03/26/2021     Lab Results   Component Value Date    LDL 70 04/21/2022    LDL 34 (L) 04/05/2021    LDL 91 03/26/2021     Lab Results   Component Value Date    VLDL 33 04/21/2022    VLDL 15 04/05/2021    VLDL 24 03/26/2021        Impression/Plan:  1.  Essential hypertension Uncontrolled: Continue amlodipine 10 mg a day.  Continue clonidine 0.1 mg twice a day.  Increase lisinopril to 20 mg twice a day.  Continue Procardia XL 60 mg a day.  Monitor blood pressure regularly.  2.   Palpitations/sinus tachycardia: Improved: Continue carvedilol 25 mg twice a day.  3.  Hyperlipidemia: Continue Lipitor 20 mg a day.  Lipid profile reviewed.  4.  Positive for nicotine use: Smoking cessation discussed with patient        Javid White MD   11/15/22   14:06 EST

## 2022-11-15 ENCOUNTER — OFFICE VISIT (OUTPATIENT)
Dept: CARDIOLOGY | Facility: CLINIC | Age: 52
End: 2022-11-15

## 2022-11-15 VITALS
HEART RATE: 80 BPM | DIASTOLIC BLOOD PRESSURE: 92 MMHG | HEIGHT: 68 IN | BODY MASS INDEX: 26.07 KG/M2 | WEIGHT: 172 LBS | SYSTOLIC BLOOD PRESSURE: 188 MMHG

## 2022-11-15 DIAGNOSIS — I10 HYPERTENSION, ESSENTIAL: Primary | ICD-10-CM

## 2022-11-15 DIAGNOSIS — R00.2 PALPITATIONS: ICD-10-CM

## 2022-11-15 DIAGNOSIS — Z72.0 NICOTINE USE: ICD-10-CM

## 2022-11-15 DIAGNOSIS — E78.2 HYPERLIPEMIA, MIXED: ICD-10-CM

## 2022-11-15 PROCEDURE — 99214 OFFICE O/P EST MOD 30 MIN: CPT | Performed by: SPECIALIST

## 2022-11-15 RX ORDER — LISINOPRIL 20 MG/1
20 TABLET ORAL 2 TIMES DAILY
Qty: 180 TABLET | Refills: 6 | Status: SHIPPED | OUTPATIENT
Start: 2022-11-15

## 2022-11-15 NOTE — PATIENT INSTRUCTIONS
Managing the Challenge of Quitting Smoking  Quitting smoking is a physical and mental challenge. You will face cravings, withdrawal symptoms, and temptation. Before quitting, work with your health care provider to make a plan that can help you manage quitting. Preparation can help you quit and keep you from giving in.  How to manage lifestyle changes  Managing stress  Stress can make you want to smoke, and wanting to smoke may cause stress. It is important to find ways to manage your stress. You might try some of the following:  Practice relaxation techniques.  Breathe slowly and deeply, in through your nose and out through your mouth.  Listen to music.  Soak in a bath or take a shower.  Imagine a peaceful place or vacation.  Get some support.  Talk with family or friends about your stress.  Join a support group.  Talk with a counselor or therapist.  Get some physical activity.  Go for a walk, run, or bike ride.  Play a favorite sport.  Practice yoga.    Medicines  Talk with your health care provider about medicines that might help you deal with cravings and make quitting easier for you.  Relationships  Social situations can be difficult when you are quitting smoking. To manage this, you can:  Avoid parties and other social situations where people might be smoking.  Avoid alcohol.  Leave right away if you have the urge to smoke.  Explain to your family and friends that you are quitting smoking. Ask for support and let them know you might be a bit grumpy.  Plan activities where smoking is not an option.  General instructions  Be aware that many people gain weight after they quit smoking. However, not everyone does. To keep from gaining weight, have a plan in place before you quit and stick to the plan after you quit. Your plan should include:  Having healthy snacks. When you have a craving, it may help to:  Eat popcorn, carrots, celery, or other cut vegetables.  Chew sugar-free gum.  Changing how you eat.  Eat small  portion sizes at meals.  Eat 4-6 small meals throughout the day instead of 1-2 large meals a day.  Be mindful when you eat. Do not watch television or do other things that might distract you as you eat.  Exercising regularly.  Make time to exercise each day. If you do not have time for a long workout, do short bouts of exercise for 5-10 minutes several times a day.  Do some form of strengthening exercise, such as weight lifting.  Do some exercise that gets your heart beating and causes you to breathe deeply, such as walking fast, running, swimming, or biking. This is very important.  Drinking plenty of water or other low-calorie or no-calorie drinks. Drink 6-8 glasses of water daily.    How to recognize withdrawal symptoms  Your body and mind may experience discomfort as you try to get used to not having nicotine in your system. These effects are called withdrawal symptoms. They may include:  Feeling hungrier than normal.  Having trouble concentrating.  Feeling irritable or restless.  Having trouble sleeping.  Feeling depressed.  Craving a cigarette.  To manage withdrawal symptoms:  Avoid places, people, and activities that trigger your cravings.  Remember why you want to quit.  Get plenty of sleep.  Avoid coffee and other caffeinated drinks. These may worsen some of your symptoms.  These symptoms may surprise you. But be assured that they are normal to have when quitting smoking.  How to manage cravings  Come up with a plan for how to deal with your cravings. The plan should include the following:  A definition of the specific situation you want to deal with.  An alternative action you will take.  A clear idea for how this action will help.  The name of someone who might help you with this.  Cravings usually last for 5-10 minutes. Consider taking the following actions to help you with your plan to deal with cravings:  Keep your mouth busy.  Chew sugar-free gum.  Suck on hard candies or a straw.  Brush your  teeth.  Keep your hands and body busy.  Change to a different activity right away.  Squeeze or play with a ball.  Do an activity or a hobby, such as making bead jewelry, practicing needlepoint, or working with wood.  Mix up your normal routine.  Take a short exercise break. Go for a quick walk or run up and down stairs.  Focus on doing something kind or helpful for someone else.  Call a friend or family member to talk during a craving.  Join a support group.  Contact a quitline.  Where to find support  To get help or find a support group:  Call the [employer] [place]'s Smoking Quitline: 2-111-QUIT NOW (376-7126)  Visit the website of the Substance Abuse and Mental Health Services Administration: www.samhsa.gov  Text QUIT to SmokefreeTXT: 106619  Where to find more information  Visit these websites to find more information on quitting smoking:  [employer] [place]: www.smokefree.gov  American Lung Association: www.lung.org  American Cancer Society: www.cancer.org  Centers for Disease Control and Prevention: www.cdc.gov  American Heart Association: www.heart.org  Contact a health care provider if:  You want to change your plan for quitting.  The medicines you are taking are not helping.  Your eating feels out of control or you cannot sleep.  Get help right away if:  You feel depressed or become very anxious.  Summary  Quitting smoking is a physical and mental challenge. You will face cravings, withdrawal symptoms, and temptation to smoke again. Preparation can help you as you go through these challenges.  Try different techniques to manage stress, handle social situations, and prevent weight gain.  You can deal with cravings by keeping your mouth busy (such as by chewing gum), keeping your hands and body busy, calling family or friends, or contacting a quitline for people who want to quit smoking.  You can deal with withdrawal symptoms by avoiding places where people smoke, getting plenty of rest, and  avoiding drinks with caffeine.  This information is not intended to replace advice given to you by your health care provider. Make sure you discuss any questions you have with your health care provider.  Document Revised: 08/26/2022 Document Reviewed: 10/06/2020  Elsevier Patient Education © 2022 [employer]

## 2022-12-13 RX ORDER — NIFEDIPINE 30 MG/1
TABLET, EXTENDED RELEASE ORAL
Qty: 180 TABLET | Refills: 1 | Status: SHIPPED | OUTPATIENT
Start: 2022-12-13

## 2023-01-08 DIAGNOSIS — E78.2 HYPERLIPEMIA, MIXED: ICD-10-CM

## 2023-01-08 DIAGNOSIS — I10 ESSENTIAL HYPERTENSION: ICD-10-CM

## 2023-01-08 DIAGNOSIS — F32.89 OTHER DEPRESSION: ICD-10-CM

## 2023-01-09 RX ORDER — CITALOPRAM 40 MG/1
40 TABLET ORAL DAILY
Qty: 90 TABLET | Refills: 2 | Status: SHIPPED | OUTPATIENT
Start: 2023-01-09

## 2023-01-09 RX ORDER — CLONIDINE HYDROCHLORIDE 0.1 MG/1
TABLET ORAL
Qty: 180 TABLET | Refills: 2 | Status: SHIPPED | OUTPATIENT
Start: 2023-01-09

## 2023-01-09 RX ORDER — FUROSEMIDE 40 MG/1
40 TABLET ORAL DAILY
Qty: 90 TABLET | Refills: 2 | Status: SHIPPED | OUTPATIENT
Start: 2023-01-09

## 2023-01-09 RX ORDER — HYDRALAZINE HYDROCHLORIDE 100 MG/1
TABLET, FILM COATED ORAL
Qty: 270 TABLET | Refills: 2 | Status: SHIPPED | OUTPATIENT
Start: 2023-01-09

## 2023-01-09 RX ORDER — ATORVASTATIN CALCIUM 20 MG/1
20 TABLET, FILM COATED ORAL DAILY
Qty: 90 TABLET | Refills: 2 | Status: SHIPPED | OUTPATIENT
Start: 2023-01-09

## 2023-01-09 RX ORDER — TAMSULOSIN HYDROCHLORIDE 0.4 MG/1
1 CAPSULE ORAL DAILY
Qty: 90 CAPSULE | Refills: 2 | Status: SHIPPED | OUTPATIENT
Start: 2023-01-09

## 2023-01-16 ENCOUNTER — PATIENT OUTREACH (OUTPATIENT)
Dept: CASE MANAGEMENT | Facility: OTHER | Age: 53
End: 2023-01-16
Payer: COMMERCIAL

## 2023-01-16 DIAGNOSIS — N18.4 STAGE 4 CHRONIC KIDNEY DISEASE: ICD-10-CM

## 2023-01-16 DIAGNOSIS — E11.3513 TYPE 2 DIABETES MELLITUS WITH BOTH EYES AFFECTED BY PROLIFERATIVE RETINOPATHY AND MACULAR EDEMA, WITH LONG-TERM CURRENT USE OF INSULIN: ICD-10-CM

## 2023-01-16 DIAGNOSIS — Z79.4 TYPE 2 DIABETES MELLITUS WITH BOTH EYES AFFECTED BY PROLIFERATIVE RETINOPATHY AND MACULAR EDEMA, WITH LONG-TERM CURRENT USE OF INSULIN: ICD-10-CM

## 2023-01-16 DIAGNOSIS — I50.32 CHRONIC DIASTOLIC (CONGESTIVE) HEART FAILURE: ICD-10-CM

## 2023-01-16 DIAGNOSIS — I10 ESSENTIAL HYPERTENSION: Primary | ICD-10-CM

## 2023-01-16 PROCEDURE — 99490 CHRNC CARE MGMT STAFF 1ST 20: CPT | Performed by: STUDENT IN AN ORGANIZED HEALTH CARE EDUCATION/TRAINING PROGRAM

## 2023-01-23 RX ORDER — ROPINIROLE 0.25 MG/1
TABLET, FILM COATED ORAL
Qty: 90 TABLET | Refills: 2 | Status: SHIPPED | OUTPATIENT
Start: 2023-01-23

## 2023-01-23 RX ORDER — CARVEDILOL 25 MG/1
TABLET ORAL
Qty: 180 TABLET | Refills: 2 | Status: SHIPPED | OUTPATIENT
Start: 2023-01-23

## 2023-01-24 NOTE — PROGRESS NOTES
"Chief Complaint  Chronic Kidney Disease, Diabetes, and Hypertension    Subjective          Rogelio Dee presents to [facility]  History of Present Illness    Checking BP about twice a week.  Highest was 200/102.  Mostly running 190s over 80s at home per his report.    Reports gets chest pain if \"walking too far\".    Blood sugar at home running around 160. Checking twice a day (30-45 minutes after breakfast and after supper).    Smoking 10 cigarettes a day.  Reports not interested in quitting.    Reports feeling \"blah.\"  Denies SI.    Blind in left eye.  Hasn't seen ophtho in quite some time per his report.    Seen by cardiology and pain management in the interim.  Next appointment with Nephro is this March.    Current Outpatient Medications   Medication Instructions   • Admelog 100 UNIT/ML injection ADMINISTER 15 UNITS UNDER THE SKIN BEFORE A MEAL AS NEEDED PER SLIDING SCALE   • amLODIPine (NORVASC) 10 MG tablet amlodipine 10 mg tablet   • aspirin 81 mg, Oral, Daily   • atorvastatin (LIPITOR) 20 mg, Oral, Daily   • carvedilol (COREG) 25 MG tablet TAKE 1 TABLET BY MOUTH TWICE DAILY WITH MEALS   • cetirizine (ZYRTEC) 5 mg, Oral, Daily   • Cholecalciferol 2,000 Units, Oral, Daily   • citalopram (CELEXA) 40 mg, Oral, Daily   • cloNIDine (CATAPRES) 0.1 MG tablet TAKE 1 TABLET BY MOUTH TWICE DAILY   • cyclobenzaprine (FLEXERIL) 10 MG tablet Every 8 Hours Scheduled   • dorzolamide-timolol (COSOPT) 22.3-6.8 MG/ML ophthalmic solution 1 drop, Both Eyes, 2 Times Daily   • ferrous sulfate 325 (65 FE) MG tablet FeroSul 325 mg (65 mg iron) tablet   TAKE 1 TABLET BY MOUTH TWICE DAILY   • furosemide (LASIX) 40 mg, Oral, Daily   • hydrALAZINE (APRESOLINE) 100 MG tablet TAKE 1 TABLET BY MOUTH THREE TIMES DAILY   • HYDROcodone-acetaminophen (NORCO) 7.5-325 MG per tablet Every 6 Hours   • insulin degludec (TRESIBA FLEXTOUCH) 20 Units, Subcutaneous, Daily   • Insulin Lispro (1 Unit Dial) " "(HUMALOG) 8 Units, Subcutaneous, Daily With Breakfast, Lunch & Dinner   • lisinopril (PRINIVIL,ZESTRIL) 20 mg, Oral, 2 Times Daily   • NIFEdipine XL (PROCARDIA XL) 30 MG 24 hr tablet TAKE 2 TABLETS BY MOUTH EVERY DAY   • omeprazole (PRILOSEC) 40 mg, Oral, Daily   • rOPINIRole (REQUIP) 0.25 MG tablet TAKE 1 TABLET BY MOUTH EVERY NIGHT 1 HOUR BEFORE BEDTIME   • sodium bicarbonate 650 mg, Oral, 3 Times Daily   • sodium polystyrene (KAYEXALATE) 15 GM/60ML suspension SPS (with sorbitol) 15 gram-20 gram/60 mL oral suspension   • tamsulosin (FLOMAX) 0.4 mg, Oral, Daily       The following portions of the patient's history were reviewed and updated as appropriate: allergies, current medications, past family history, past medical history, past social history, past surgical history, and problem list.    Objective   Vital Signs:   /80   Pulse 88   Temp 97.5 °F (36.4 °C) (Temporal)   Ht 172.7 cm (68\")   Wt 79.6 kg (175 lb 6.4 oz)   SpO2 96%   BMI 26.67 kg/m²     Wt Readings from Last 3 Encounters:   01/25/23 79.6 kg (175 lb 6.4 oz)   11/15/22 78 kg (172 lb)   10/21/22 77.8 kg (171 lb 9.6 oz)     BP Readings from Last 3 Encounters:   01/25/23 142/80   11/15/22 (!) 188/92   10/21/22 144/82     Physical Exam  Vitals reviewed.   Constitutional:       General: He is not in acute distress.     Appearance: Normal appearance. He is not ill-appearing, toxic-appearing or diaphoretic.   HENT:      Head: Normocephalic and atraumatic.      Right Ear: External ear normal.      Left Ear: External ear normal.   Eyes:      Conjunctiva/sclera: Conjunctivae normal.   Cardiovascular:      Rate and Rhythm: Normal rate and regular rhythm.      Pulses: Normal pulses.      Heart sounds: Normal heart sounds. No murmur heard.    No friction rub. No gallop.   Pulmonary:      Effort: Pulmonary effort is normal. No respiratory distress.      Breath sounds: Normal breath sounds. No stridor. No wheezing, rhonchi or rales.   Chest:      Chest " wall: No tenderness.   Abdominal:      General: Abdomen is flat.      Palpations: Abdomen is soft. There is no mass.      Tenderness: There is no abdominal tenderness.   Musculoskeletal:      Right lower leg: No edema.      Left lower leg: No edema.   Skin:     General: Skin is warm and dry.   Neurological:      General: No focal deficit present.      Mental Status: He is alert. Mental status is at baseline.   Psychiatric:         Mood and Affect: Mood normal.         Behavior: Behavior normal.         Thought Content: Thought content normal.         Judgment: Judgment normal.         Result Review :   The following data was reviewed by: Simeon Lomeli MD on 01/25/2023:  Common labs    Common Labs 4/21/22 4/21/22 4/21/22 4/21/22 4/21/22 7/21/22 7/21/22 7/21/22 10/21/22 10/21/22    1243 1243 1243 1243 1243 1622 1622 1622 1524 1524   Glucose     339 (A)   461 (A)  248 (A)   BUN     69 (A)   63 (A)  72 (A)   Creatinine     3.18 (A)   3.49 (A)  3.46 (A)   Sodium     129 (A)   127 (A)  131 (A)   Potassium     4.1   4.1  4.5   Chloride     92 (A)   91 (A)  95 (A)   Calcium     9.4   9.7  9.3   Albumin     4.70   4.30     Total Bilirubin     0.2   0.3     Alkaline Phosphatase     126 (A)   116     AST (SGOT)     11   8     ALT (SGPT)     13   11     WBC 10.61     9.20       Hemoglobin 11.1 (A)     10.9 (A)       Hematocrit 33.9 (A)     32.6 (A)       Platelets 340     352       Total Cholesterol    133         Triglycerides    195 (A)         HDL Cholesterol    30 (A)         LDL Cholesterol     70         Hemoglobin A1C   10.50 (A)    9.80 (A)  10.00 (A)    Microalbumin, Urine  84.7           (A) Abnormal value                   Lab Results   Component Value Date    INR 0.91 (L) 09/02/2020    BILIRUBINUR Negative 07/01/2021       Procedures        Assessment and Plan    Diagnoses and all orders for this visit:    1. Stage 4 chronic kidney disease (HCC) (Primary)  -     Hemoglobin A1c  -     Comprehensive Metabolic  Panel  -     PTH, Intact  -     CBC & Differential  -     Iron Profile  -     Ferritin    2. Type 2 diabetes mellitus with both eyes affected by proliferative retinopathy and macular edema, with long-term current use of insulin (HCC)  -     Hemoglobin A1c  -     Comprehensive Metabolic Panel  -     Ambulatory Referral for Diabetic Eye Exam-Ophthalmology    3. Other depression    4. Essential hypertension  -     Comprehensive Metabolic Panel    5. Hyperlipemia, mixed    6. Metabolic acidosis  -     Comprehensive Metabolic Panel    7. Normocytic anemia  -     CBC & Differential  -     Iron Profile  -     Ferritin  -     Vitamin B12  -     Folate    8. Chronic diastolic (congestive) heart failure (HCC)    9. Blindness of left eye, unspecified right eye visual impairment category  -     Ambulatory Referral for Diabetic Eye Exam-Ophthalmology    CKD 4, HTN:  -Mr. Lepe's BP's have been quite difficult to control, but measurement today is reasonable  -following with nephro  -will check labwork today    Depression:  -stable  -denies SI    There are no discontinued medications.       Follow Up   Return in about 3 months (around 4/25/2023) for Annual physical.  Patient was given instructions and counseling regarding his condition or for health maintenance advice. Please see specific information pulled into the AVS if appropriate.       Simeon Lomeli MD  01/25/23  15:10 EST

## 2023-01-25 ENCOUNTER — OFFICE VISIT (OUTPATIENT)
Dept: INTERNAL MEDICINE | Facility: CLINIC | Age: 53
End: 2023-01-25
Payer: COMMERCIAL

## 2023-01-25 VITALS
HEART RATE: 88 BPM | DIASTOLIC BLOOD PRESSURE: 80 MMHG | OXYGEN SATURATION: 96 % | HEIGHT: 68 IN | TEMPERATURE: 97.5 F | SYSTOLIC BLOOD PRESSURE: 142 MMHG | BODY MASS INDEX: 26.58 KG/M2 | WEIGHT: 175.4 LBS

## 2023-01-25 DIAGNOSIS — H54.40 BLINDNESS OF LEFT EYE, UNSPECIFIED RIGHT EYE VISUAL IMPAIRMENT CATEGORY: ICD-10-CM

## 2023-01-25 DIAGNOSIS — Z79.4 TYPE 2 DIABETES MELLITUS WITH BOTH EYES AFFECTED BY PROLIFERATIVE RETINOPATHY AND MACULAR EDEMA, WITH LONG-TERM CURRENT USE OF INSULIN: ICD-10-CM

## 2023-01-25 DIAGNOSIS — D64.9 NORMOCYTIC ANEMIA: ICD-10-CM

## 2023-01-25 DIAGNOSIS — F32.89 OTHER DEPRESSION: ICD-10-CM

## 2023-01-25 DIAGNOSIS — I10 ESSENTIAL HYPERTENSION: ICD-10-CM

## 2023-01-25 DIAGNOSIS — E11.3513 TYPE 2 DIABETES MELLITUS WITH BOTH EYES AFFECTED BY PROLIFERATIVE RETINOPATHY AND MACULAR EDEMA, WITH LONG-TERM CURRENT USE OF INSULIN: ICD-10-CM

## 2023-01-25 DIAGNOSIS — E78.2 HYPERLIPEMIA, MIXED: ICD-10-CM

## 2023-01-25 DIAGNOSIS — N18.4 STAGE 4 CHRONIC KIDNEY DISEASE: Primary | ICD-10-CM

## 2023-01-25 DIAGNOSIS — I50.32 CHRONIC DIASTOLIC (CONGESTIVE) HEART FAILURE: ICD-10-CM

## 2023-01-25 DIAGNOSIS — E87.20 METABOLIC ACIDOSIS: ICD-10-CM

## 2023-01-25 LAB
BASOPHILS # BLD AUTO: 0.11 10*3/MM3 (ref 0–0.2)
BASOPHILS NFR BLD AUTO: 1.3 % (ref 0–1.5)
DEPRECATED RDW RBC AUTO: 39.1 FL (ref 37–54)
EOSINOPHIL # BLD AUTO: 0.28 10*3/MM3 (ref 0–0.4)
EOSINOPHIL NFR BLD AUTO: 3.2 % (ref 0.3–6.2)
ERYTHROCYTE [DISTWIDTH] IN BLOOD BY AUTOMATED COUNT: 11.8 % (ref 12.3–15.4)
FERRITIN SERPL-MCNC: 281 NG/ML (ref 30–400)
FOLATE SERPL-MCNC: 15.7 NG/ML (ref 4.78–24.2)
HBA1C MFR BLD: 8.8 % (ref 4.8–5.6)
HCT VFR BLD AUTO: 31.9 % (ref 37.5–51)
HGB BLD-MCNC: 10 G/DL (ref 13–17.7)
IMM GRANULOCYTES # BLD AUTO: 0.07 10*3/MM3 (ref 0–0.05)
IMM GRANULOCYTES NFR BLD AUTO: 0.8 % (ref 0–0.5)
IRON 24H UR-MRATE: 75 MCG/DL (ref 59–158)
IRON SATN MFR SERPL: 24 % (ref 20–50)
LYMPHOCYTES # BLD AUTO: 0.99 10*3/MM3 (ref 0.7–3.1)
LYMPHOCYTES NFR BLD AUTO: 11.3 % (ref 19.6–45.3)
MCH RBC QN AUTO: 28.8 PG (ref 26.6–33)
MCHC RBC AUTO-ENTMCNC: 31.3 G/DL (ref 31.5–35.7)
MCV RBC AUTO: 91.9 FL (ref 79–97)
MONOCYTES # BLD AUTO: 0.41 10*3/MM3 (ref 0.1–0.9)
MONOCYTES NFR BLD AUTO: 4.7 % (ref 5–12)
NEUTROPHILS NFR BLD AUTO: 6.93 10*3/MM3 (ref 1.7–7)
NEUTROPHILS NFR BLD AUTO: 78.7 % (ref 42.7–76)
NRBC BLD AUTO-RTO: 0 /100 WBC (ref 0–0.2)
PLATELET # BLD AUTO: 365 10*3/MM3 (ref 140–450)
PMV BLD AUTO: 11.3 FL (ref 6–12)
PTH-INTACT SERPL-MCNC: 225 PG/ML (ref 15–65)
RBC # BLD AUTO: 3.47 10*6/MM3 (ref 4.14–5.8)
TIBC SERPL-MCNC: 308 MCG/DL (ref 298–536)
TRANSFERRIN SERPL-MCNC: 207 MG/DL (ref 200–360)
VIT B12 BLD-MCNC: 569 PG/ML (ref 211–946)
WBC NRBC COR # BLD: 8.79 10*3/MM3 (ref 3.4–10.8)

## 2023-01-25 PROCEDURE — 84466 ASSAY OF TRANSFERRIN: CPT | Performed by: STUDENT IN AN ORGANIZED HEALTH CARE EDUCATION/TRAINING PROGRAM

## 2023-01-25 PROCEDURE — 85025 COMPLETE CBC W/AUTO DIFF WBC: CPT | Performed by: STUDENT IN AN ORGANIZED HEALTH CARE EDUCATION/TRAINING PROGRAM

## 2023-01-25 PROCEDURE — 83036 HEMOGLOBIN GLYCOSYLATED A1C: CPT | Performed by: STUDENT IN AN ORGANIZED HEALTH CARE EDUCATION/TRAINING PROGRAM

## 2023-01-25 PROCEDURE — 82746 ASSAY OF FOLIC ACID SERUM: CPT | Performed by: STUDENT IN AN ORGANIZED HEALTH CARE EDUCATION/TRAINING PROGRAM

## 2023-01-25 PROCEDURE — 80053 COMPREHEN METABOLIC PANEL: CPT | Performed by: STUDENT IN AN ORGANIZED HEALTH CARE EDUCATION/TRAINING PROGRAM

## 2023-01-25 PROCEDURE — 83540 ASSAY OF IRON: CPT | Performed by: STUDENT IN AN ORGANIZED HEALTH CARE EDUCATION/TRAINING PROGRAM

## 2023-01-25 PROCEDURE — 83970 ASSAY OF PARATHORMONE: CPT | Performed by: STUDENT IN AN ORGANIZED HEALTH CARE EDUCATION/TRAINING PROGRAM

## 2023-01-25 PROCEDURE — 82728 ASSAY OF FERRITIN: CPT | Performed by: STUDENT IN AN ORGANIZED HEALTH CARE EDUCATION/TRAINING PROGRAM

## 2023-01-25 PROCEDURE — 82607 VITAMIN B-12: CPT | Performed by: STUDENT IN AN ORGANIZED HEALTH CARE EDUCATION/TRAINING PROGRAM

## 2023-01-25 PROCEDURE — 99214 OFFICE O/P EST MOD 30 MIN: CPT | Performed by: STUDENT IN AN ORGANIZED HEALTH CARE EDUCATION/TRAINING PROGRAM

## 2023-01-26 ENCOUNTER — TELEPHONE (OUTPATIENT)
Dept: INTERNAL MEDICINE | Facility: CLINIC | Age: 53
End: 2023-01-26
Payer: COMMERCIAL

## 2023-01-26 LAB
ALBUMIN SERPL-MCNC: 4.1 G/DL (ref 3.5–5.2)
ALBUMIN/GLOB SERPL: 1.2 G/DL
ALP SERPL-CCNC: 112 U/L (ref 39–117)
ALT SERPL W P-5'-P-CCNC: 14 U/L (ref 1–41)
ANION GAP SERPL CALCULATED.3IONS-SCNC: 13 MMOL/L (ref 5–15)
AST SERPL-CCNC: 10 U/L (ref 1–40)
BILIRUB SERPL-MCNC: 0.2 MG/DL (ref 0–1.2)
BUN SERPL-MCNC: 61 MG/DL (ref 6–20)
BUN/CREAT SERPL: 14.8 (ref 7–25)
CALCIUM SPEC-SCNC: 9.3 MG/DL (ref 8.6–10.5)
CHLORIDE SERPL-SCNC: 100 MMOL/L (ref 98–107)
CO2 SERPL-SCNC: 18 MMOL/L (ref 22–29)
CREAT SERPL-MCNC: 4.13 MG/DL (ref 0.76–1.27)
EGFRCR SERPLBLD CKD-EPI 2021: 16.6 ML/MIN/1.73
GLOBULIN UR ELPH-MCNC: 3.3 GM/DL
GLUCOSE SERPL-MCNC: 476 MG/DL (ref 65–99)
POTASSIUM SERPL-SCNC: 4.4 MMOL/L (ref 3.5–5.2)
PROT SERPL-MCNC: 7.4 G/DL (ref 6–8.5)
SODIUM SERPL-SCNC: 131 MMOL/L (ref 136–145)

## 2023-01-26 NOTE — TELEPHONE ENCOUNTER
Relayed results to patient. Patient verbalized understanding. There were no further questions or concerns that were noted during telephone encounter.    Labs have been forwarded to Nephrologist as requested.

## 2023-01-26 NOTE — TELEPHONE ENCOUNTER
----- Message from Simeon Lomeli MD sent at 1/26/2023  8:11 AM EST -----  Labs are notable for a worsening of renal function from a GFR of 20.5 to 16.6.  Highly elevated blood sugar is noted at 476.  I know you said you were taking your prescribed medicines, but I suspect you might be taking them less consistently than I understood based on yesterday's appointment.  It is very important that you take these medicines consistently, both for your kidney function as well as your overall health.    PTH is up from 146 to 225.    CBC notable for stable anemia.    A1c shows some improvement, and is down from 10% to 8.8%.    Message to staff - Please transmit these labs to his nephrologist:    Marietta Hutson MD  Nephrology  NPI: [id number]  [address] [place] [address]     Phone: [phone]  Fax: [phone]

## 2023-01-30 ENCOUNTER — PATIENT OUTREACH (OUTPATIENT)
Dept: CASE MANAGEMENT | Facility: OTHER | Age: 53
End: 2023-01-30
Payer: COMMERCIAL

## 2023-01-30 DIAGNOSIS — I10 ESSENTIAL HYPERTENSION: Primary | ICD-10-CM

## 2023-01-30 DIAGNOSIS — Z79.4 TYPE 2 DIABETES MELLITUS WITH BOTH EYES AFFECTED BY PROLIFERATIVE RETINOPATHY AND MACULAR EDEMA, WITH LONG-TERM CURRENT USE OF INSULIN: ICD-10-CM

## 2023-01-30 DIAGNOSIS — E11.3513 TYPE 2 DIABETES MELLITUS WITH BOTH EYES AFFECTED BY PROLIFERATIVE RETINOPATHY AND MACULAR EDEMA, WITH LONG-TERM CURRENT USE OF INSULIN: ICD-10-CM

## 2023-01-30 NOTE — OUTREACH NOTE
AMBULATORY CASE MANAGEMENT NOTE    Name and Relationship of Patient/Support Person:  -     [facility] End of Month Documentation    This Chronic Medical Management Care Plan for Rogelio Dee, 52 y.o. male, has been monitored and managed; reviewed and a new plan of care implemented for the month of January.  A cumulative time of 23  minutes was spent on this patient record this month, including chart review; phone call with patient.    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, bilateral; Stage 4 CKD; Blindness of left eye; Diabetic neuropathy; Suicidal ideation; Depression; Housing situation unstable; Smoker; Chronic diastolic CHF; Essential hypertension; Hyperlipemia, mixed; Chronic pain disorder; Combined forms of age-related cataract, bilateral; Displacement of lumbar intervertebral disc without myelopathy; Ocular hypertension, bilateral; Cigarette nicotine dependence without complication; History of suicidal ideation; Overweight (BMI 25.0-29.9); Food insecurity; and Lumbar spondylosis on their problem list., the following items were addressed: medical records; medications and any changes can be found within the plan section of the note.  A detailed listing of time spent for chronic care management is tracked within each outreach encounter.  Current medications include:  has a current medication list which includes the following prescription(s): admelog, amlodipine, aspirin, atorvastatin, carvedilol, cetirizine, cholecalciferol, citalopram, clonidine, cyclobenzaprine, dorzolamide-timolol, ferrous sulfate, furosemide, hydralazine, hydrocodone-acetaminophen, insulin degludec, insulin lispro (1 unit dial), lisinopril, nifedipine xl, omeprazole, ropinirole, sodium bicarbonate, sodium polystyrene, and tamsulosin. and the patient is reported to be patient is noncompliant with medication protocol,  Medications are reported to be non-effective in controlling symptoms  and changes have been made to the medication protocol.  All notes on chart for PCP to review.    The patient was monitored remotely for blood glucose; blood pressure.    The patient's physical needs include:  help taking medications as prescribed; medication education; needs assistance with ADLs.     The patient's mental support needs include:  increased support    The patient's cognitive support needs include:  not applicable    The patient's psychosocial support needs include:  need for increased support    The patient's functional needs include: needs met    The patient's environmental needs include:  not applicable, N/A    Care Plan overall comments:  Care plan updated,    Refer to previous outreach notes for more information on the areas listed above.    Monthly Billing Diagnoses  (I10) Essential hypertension    (E11.3513,  Z79.4) Type 2 diabetes mellitus with both eyes affected by proliferative retinopathy and macular edema, with long-term current use of insulin (HCC)    Medications   · Medications have been reconciled    Care Plan progress this month:      Recently Modified Care Plans Updates made since 12/30/2022 12:00 AM     Diabetes Type 2 (Adult)         Problem Priority Last Modified     ELS GLYCEMIC MANAGEMENT (DIABETES, TYPE 2) (ADULT) --  4/22/2022 12:35 PM by Connor Davis MA              Goal Recent Progress Last Modified     Glycemic Management Optimized --  4/22/2022 12:35 PM by Connor Davis MA     Evidence-based guidance:   Anticipate A1C testing (point-of-care) every 3 to 6 months based on goal attainment.   Review mutually-set A1C goal or target range.   Anticipate use of antihyperglycemic with or without insulin and periodic adjustments; consider active involvement of pharmacist.   Provide medical nutrition therapy and development of individualized eating.   Compare self-reported symptoms of hypo or hyperglycemia to blood glucose levels, diet and fluid intake, current medications,  psychosocial and physiologic stressors, change in activity and barriers to care adherence.   Promote self-monitoring of blood glucose levels.   Assess and address barriers to management plan, such as food insecurity, age, developmental ability, depression, anxiety, fear of hypoglycemia or weight gain, as well as medication cost, side effects and complicated regimen.   Consider referral to community-based diabetes education program, visiting nurse, community health worker or health .   Encourage regular dental care for treatment of periodontal disease; refer to dental provider when needed.    Notes:            Task Due Date Last Modified     Alleviate Barriers to Glycemic Management --  1/16/2023  9:20 AM by Connor Davis MA     Care Management Activities:      - A1C testing facilitated  - barriers to adherence to treatment plan identified  - blood glucose monitoring encouraged  - blood glucose readings reviewed  - encourage participation in diabetes education classes  - individualized medical nutrition therapy provided  - mutual A1C goal set or reviewed  - resources required to improve adherence to care identified  - self-awareness of signs/symptoms of hypo or hyperglycemia encouraged  - use of blood glucose monitoring log promoted      Notes:              Problem Priority Last Modified     ELS DISEASE PROGRESSION (DIABETES, TYPE 2) (ADULT) --  4/22/2022 12:35 PM by Connor Davis MA              Goal Recent Progress Last Modified     Disease Progression Prevented or Minimized --  4/22/2022 12:35 PM by Connor Davis MA     Evidence-based guidance:   Prepare patient for laboratory and diagnostic exams based on risk and presentation.   Encourage lifestyle changes, such as increased intake of plant-based foods, stress reduction, consistent physical activity and smoking cessation to prevent long-term complications and chronic disease.    Individualize activity and exercise recommendations while considering  potential limitations, such as neuropathy, retinopathy or the ability to prevent hyperglycemia or hypoglycemia.    Prepare patient for use of pharmacologic therapy that may include antihypertensive, analgesic, prostaglandin E1 with periodic adjustments, based on presenting chronic condition and laboratory results.   Assess signs/symptoms and risk factors for hypertension, sleep-disordered breathing, neuropathy (including changes in gait and balance), retinopathy, nephropathy and sexual dysfunction.   Address pregnancy planning and contraceptive choice, especially when prescribing antihypertensive or statin.   Ensure completion of annual comprehensive foot exam and dilated eye exam.    Implement additional individualized goals and interventions based on identified risk factors.   Prepare patient for consultation or referral for specialist care, such as ophthalmology, neurology, cardiology, podiatry, nephrology or perinatology.    Notes:            Task Due Date Last Modified     Monitor and Manage Follow-up for Comorbidities --  1/16/2023  9:20 AM by Connor Davis MA     Care Management Activities:      - activity based on tolerance and functional limitations encouraged  - healthy lifestyle promoted  - medication side effects managed  - response to pharmacologic therapy monitored  - signs/symptoms of comorbidities identified  - vital signs and trends reviewed      Notes:                      · Current Specialty Plan of Care Status in process    Instructions   · Patient was provided an electronic copy of care plan  · CCM services were explained and offered and patient has accepted these services.  · Patient has given their written consent to receive CCM services and understands that this includes the authorization of electronic communication of medical information with the other treating providers.  · Patient understands that they may stop CCM services at any time and these changes will be effective at the end of the  calendar month and will effectively revocate the agreement of CCM services.  · Patient understands that only one practitioner can furnish and be paid for CCM services during one calendar month.  Patient also understands that there may be co-payment or deductible fees in association with CCM services.  · Patient will continue with at least monthly follow-up calls with the Ambulatory .    JULES HUIZAR  Ambulatory Case Management    1/30/2023, 15:16 EST    Education Documentation  No documentation found.        JULES HUIZAR  Ambulatory Case Management    1/30/2023, 15:16 EST

## 2023-02-14 ENCOUNTER — PATIENT OUTREACH (OUTPATIENT)
Dept: CASE MANAGEMENT | Facility: OTHER | Age: 53
End: 2023-02-14
Payer: COMMERCIAL

## 2023-02-14 DIAGNOSIS — E11.3513 TYPE 2 DIABETES MELLITUS WITH BOTH EYES AFFECTED BY PROLIFERATIVE RETINOPATHY AND MACULAR EDEMA, WITH LONG-TERM CURRENT USE OF INSULIN: ICD-10-CM

## 2023-02-14 DIAGNOSIS — Z79.4 TYPE 2 DIABETES MELLITUS WITH BOTH EYES AFFECTED BY PROLIFERATIVE RETINOPATHY AND MACULAR EDEMA, WITH LONG-TERM CURRENT USE OF INSULIN: ICD-10-CM

## 2023-02-14 DIAGNOSIS — I10 ESSENTIAL HYPERTENSION: Primary | ICD-10-CM

## 2023-02-14 PROCEDURE — 99490 CHRNC CARE MGMT STAFF 1ST 20: CPT | Performed by: STUDENT IN AN ORGANIZED HEALTH CARE EDUCATION/TRAINING PROGRAM

## 2023-02-14 NOTE — OUTREACH NOTE
AMBULATORY CASE MANAGEMENT NOTE    Name and Relationship of Patient/Support Person: Rogelio Dee - Self    [facility] Interim Update        CM (me) reached out to the patient he stated that he felt ok today . Stated that he had some earlier issues with not receiviving the prescribed number of meds he was supposed to get . Patient stated he had reached out to the pharmacy and that the issue had been corrected. Patient stated that he was taking his insulin more appropriately now for better results in his blood sugar. Stated thit still runs around 180 30 min post meal. States he still has periods of the blood sugar reaching 500 +. I educated the patient to take meds as prescribed and follow up with PCP if needed. The patient verbalized understanding. Patient stated that he has been taking tylenol migrain and it seems to help his pain ans he also stated that since he started taking the tylenol that his BP has also decreased ( reading not known) Due to the patient having kidney issues I will reach out to the PCP to advise. .Chart reviewed , meds reviewed, patient stated no further needs at this time.All future appts reviewed . All questions were answered and contact information provided . Future outreach scheduled .       Education Documentation  No documentation found.        JULES HUIZAR  Ambulatory Case Management    2/14/2023, 14:53 EST

## 2023-02-28 ENCOUNTER — PATIENT OUTREACH (OUTPATIENT)
Dept: CASE MANAGEMENT | Facility: OTHER | Age: 53
End: 2023-02-28
Payer: COMMERCIAL

## 2023-02-28 DIAGNOSIS — E11.3513 TYPE 2 DIABETES MELLITUS WITH BOTH EYES AFFECTED BY PROLIFERATIVE RETINOPATHY AND MACULAR EDEMA, WITH LONG-TERM CURRENT USE OF INSULIN: ICD-10-CM

## 2023-02-28 DIAGNOSIS — I10 ESSENTIAL HYPERTENSION: Primary | ICD-10-CM

## 2023-02-28 DIAGNOSIS — Z79.4 TYPE 2 DIABETES MELLITUS WITH BOTH EYES AFFECTED BY PROLIFERATIVE RETINOPATHY AND MACULAR EDEMA, WITH LONG-TERM CURRENT USE OF INSULIN: ICD-10-CM

## 2023-02-28 NOTE — OUTREACH NOTE
AMBULATORY CASE MANAGEMENT NOTE    Name and Relationship of Patient/Support Person:  -     [facility] End of Month Documentation    This Chronic Medical Management Care Plan for Rogelio Dee, 53 y.o. male, has been monitored and managed; reviewed and a new plan of care implemented for the month of February.  A cumulative time of 29  minutes was spent on this patient record this month, including chart review; phone call with patient.    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, bilateral; Stage 4 CKD; Blindness of left eye; Diabetic neuropathy; Suicidal ideation; Depression; Housing situation unstable; Smoker; Chronic diastolic CHF; Essential hypertension; Hyperlipemia, mixed; Chronic pain disorder; Combined forms of age-related cataract, bilateral; Displacement of lumbar intervertebral disc without myelopathy; Ocular hypertension, bilateral; Cigarette nicotine dependence without complication; History of suicidal ideation; Overweight (BMI 25.0-29.9); Food insecurity; and Lumbar spondylosis on their problem list., the following items were addressed: medical records; medications and any changes can be found within the plan section of the note.  A detailed listing of time spent for chronic care management is tracked within each outreach encounter.  Current medications include:  has a current medication list which includes the following prescription(s): admelog, amlodipine, aspirin, atorvastatin, carvedilol, cetirizine, cholecalciferol, citalopram, clonidine, cyclobenzaprine, dorzolamide-timolol, ferrous sulfate, furosemide, hydralazine, hydrocodone-acetaminophen, insulin degludec, insulin lispro (1 unit dial), lisinopril, nifedipine xl, omeprazole, ropinirole, sodium bicarbonate, sodium polystyrene, and tamsulosin. and the patient is reported to be patient is noncompliant with medication protocol,  Medications are reported to be non-effective in controlling symptoms  and changes have been made to the medication protocol.  Regarding these diagnoses, referrals were made to the following provider(s):  .  All notes on chart for PCP to review.    The patient was monitored remotely for blood glucose; blood pressure.    The patient's physical needs include:  help taking medications as prescribed; medication education; needs assistance with ADLs.     The patient's mental support needs include:  increased support; needs met    The patient's cognitive support needs include:  not applicable    The patient's psychosocial support needs include:  need for increased support; needs met    The patient's functional needs include: needs met    The patient's environmental needs include:  not applicable, N/A    Care Plan overall comments:  Care plan updated,    Refer to previous outreach notes for more information on the areas listed above.    Monthly Billing Diagnoses  (I10) Essential hypertension    (E11.3513,  Z79.4) Type 2 diabetes mellitus with both eyes affected by proliferative retinopathy and macular edema, with long-term current use of insulin (HCC)    Medications   · Medications have been reconciled    Care Plan progress this month:      Recently Modified Care Plans Updates made since 1/28/2023 12:00 AM     Diabetes Type 2 (Adult)         Problem Priority Last Modified     ELS GLYCEMIC MANAGEMENT (DIABETES, TYPE 2) (ADULT) --  4/22/2022 12:35 PM by Connor Davis MA              Goal Recent Progress Last Modified     Glycemic Management Optimized --  4/22/2022 12:35 PM by Connor Davis MA     Evidence-based guidance:   Anticipate A1C testing (point-of-care) every 3 to 6 months based on goal attainment.   Review mutually-set A1C goal or target range.   Anticipate use of antihyperglycemic with or without insulin and periodic adjustments; consider active involvement of pharmacist.   Provide medical nutrition therapy and development of individualized eating.   Compare self-reported symptoms  of hypo or hyperglycemia to blood glucose levels, diet and fluid intake, current medications, psychosocial and physiologic stressors, change in activity and barriers to care adherence.   Promote self-monitoring of blood glucose levels.   Assess and address barriers to management plan, such as food insecurity, age, developmental ability, depression, anxiety, fear of hypoglycemia or weight gain, as well as medication cost, side effects and complicated regimen.   Consider referral to community-based diabetes education program, visiting nurse, community health worker or health .   Encourage regular dental care for treatment of periodontal disease; refer to dental provider when needed.    Notes:            Task Due Date Last Modified     Alleviate Barriers to Glycemic Management --  2/14/2023  2:52 PM by Connor Davis MA     Care Management Activities:      - A1C testing facilitated  - barriers to adherence to treatment plan identified  - blood glucose monitoring encouraged  - blood glucose readings reviewed  - encourage participation in diabetes education classes  - individualized medical nutrition therapy provided  - mutual A1C goal set or reviewed  - resources required to improve adherence to care identified  - self-awareness of signs/symptoms of hypo or hyperglycemia encouraged  - use of blood glucose monitoring log promoted      Notes:                      · Current Specialty Plan of Care Status not yet initiated    Instructions   · Patient was provided an electronic copy of care plan  · CCM services were explained and offered and patient has accepted these services.  · Patient has given their written consent to receive CCM services and understands that this includes the authorization of electronic communication of medical information with the other treating providers.  · Patient understands that they may stop CCM services at any time and these changes will be effective at the end of the calendar month and  will effectively revocate the agreement of CCM services.  · Patient understands that only one practitioner can furnish and be paid for CCM services during one calendar month.  Patient also understands that there may be co-payment or deductible fees in association with CCM services.  · Patient will continue with at least monthly follow-up calls with the Ambulatory .    JULES HUIZAR  Ambulatory Case Management    2/28/2023, 14:53 EST    Education Documentation  No documentation found.        JULES HUIZAR  Ambulatory Case Management    2/28/2023, 14:53 EST

## 2023-03-10 ENCOUNTER — TRANSCRIBE ORDERS (OUTPATIENT)
Dept: LAB | Facility: HOSPITAL | Age: 53
End: 2023-03-10
Payer: COMMERCIAL

## 2023-03-10 DIAGNOSIS — N18.4 CHRONIC KIDNEY DISEASE, STAGE IV (SEVERE): Primary | ICD-10-CM

## 2023-03-10 DIAGNOSIS — E55.9 VITAMIN D INSUFFICIENCY: ICD-10-CM

## 2023-03-13 ENCOUNTER — LAB (OUTPATIENT)
Dept: LAB | Facility: HOSPITAL | Age: 53
End: 2023-03-13
Payer: COMMERCIAL

## 2023-03-13 DIAGNOSIS — N18.4 CHRONIC KIDNEY DISEASE, STAGE IV (SEVERE): ICD-10-CM

## 2023-03-13 DIAGNOSIS — E55.9 VITAMIN D INSUFFICIENCY: ICD-10-CM

## 2023-03-13 LAB
BACTERIA UR QL AUTO: NORMAL /HPF
BILIRUB UR QL STRIP: NEGATIVE
CLARITY UR: CLEAR
COLOR UR: YELLOW
CREAT UR-MCNC: 65 MG/DL
GLUCOSE UR STRIP-MCNC: ABNORMAL MG/DL
HGB UR QL STRIP.AUTO: NEGATIVE
HYALINE CASTS UR QL AUTO: NORMAL /LPF
KETONES UR QL STRIP: NEGATIVE
LEUKOCYTE ESTERASE UR QL STRIP.AUTO: NEGATIVE
NITRITE UR QL STRIP: NEGATIVE
PH UR STRIP.AUTO: 6 [PH] (ref 5–8)
PROT ?TM UR-MCNC: 199.9 MG/DL
PROT UR QL STRIP: ABNORMAL
PROT/CREAT UR: 3.08 MG/G{CREAT}
RBC # UR STRIP: NORMAL /HPF
REF LAB TEST METHOD: NORMAL
SP GR UR STRIP: 1.01 (ref 1–1.03)
SQUAMOUS #/AREA URNS HPF: NORMAL /HPF
UROBILINOGEN UR QL STRIP: ABNORMAL
WBC # UR STRIP: NORMAL /HPF

## 2023-03-13 PROCEDURE — 82306 VITAMIN D 25 HYDROXY: CPT

## 2023-03-13 PROCEDURE — 83970 ASSAY OF PARATHORMONE: CPT

## 2023-03-13 PROCEDURE — 36415 COLL VENOUS BLD VENIPUNCTURE: CPT

## 2023-03-13 PROCEDURE — 81001 URINALYSIS AUTO W/SCOPE: CPT

## 2023-03-13 PROCEDURE — 82570 ASSAY OF URINE CREATININE: CPT

## 2023-03-13 PROCEDURE — 80069 RENAL FUNCTION PANEL: CPT

## 2023-03-13 PROCEDURE — 84156 ASSAY OF PROTEIN URINE: CPT

## 2023-03-13 PROCEDURE — 85025 COMPLETE CBC W/AUTO DIFF WBC: CPT

## 2023-03-14 LAB
25(OH)D3 SERPL-MCNC: 31.5 NG/ML (ref 30–100)
ALBUMIN SERPL-MCNC: 4 G/DL (ref 3.5–5.2)
ANION GAP SERPL CALCULATED.3IONS-SCNC: 13 MMOL/L (ref 5–15)
BASOPHILS # BLD AUTO: 0.12 10*3/MM3 (ref 0–0.2)
BASOPHILS NFR BLD AUTO: 1.3 % (ref 0–1.5)
BUN SERPL-MCNC: 49 MG/DL (ref 6–20)
BUN/CREAT SERPL: 12.9 (ref 7–25)
CALCIUM SPEC-SCNC: 9 MG/DL (ref 8.6–10.5)
CHLORIDE SERPL-SCNC: 103 MMOL/L (ref 98–107)
CO2 SERPL-SCNC: 21 MMOL/L (ref 22–29)
CREAT SERPL-MCNC: 3.8 MG/DL (ref 0.76–1.27)
DEPRECATED RDW RBC AUTO: 39.4 FL (ref 37–54)
EGFRCR SERPLBLD CKD-EPI 2021: 18.2 ML/MIN/1.73
EOSINOPHIL # BLD AUTO: 0.25 10*3/MM3 (ref 0–0.4)
EOSINOPHIL NFR BLD AUTO: 2.7 % (ref 0.3–6.2)
ERYTHROCYTE [DISTWIDTH] IN BLOOD BY AUTOMATED COUNT: 12.3 % (ref 12.3–15.4)
GLUCOSE SERPL-MCNC: 206 MG/DL (ref 65–99)
HCT VFR BLD AUTO: 31.9 % (ref 37.5–51)
HGB BLD-MCNC: 10.5 G/DL (ref 13–17.7)
IMM GRANULOCYTES # BLD AUTO: 0.06 10*3/MM3 (ref 0–0.05)
IMM GRANULOCYTES NFR BLD AUTO: 0.6 % (ref 0–0.5)
LYMPHOCYTES # BLD AUTO: 1.15 10*3/MM3 (ref 0.7–3.1)
LYMPHOCYTES NFR BLD AUTO: 12.3 % (ref 19.6–45.3)
MCH RBC QN AUTO: 29.2 PG (ref 26.6–33)
MCHC RBC AUTO-ENTMCNC: 32.9 G/DL (ref 31.5–35.7)
MCV RBC AUTO: 88.6 FL (ref 79–97)
MONOCYTES # BLD AUTO: 0.55 10*3/MM3 (ref 0.1–0.9)
MONOCYTES NFR BLD AUTO: 5.9 % (ref 5–12)
NEUTROPHILS NFR BLD AUTO: 7.2 10*3/MM3 (ref 1.7–7)
NEUTROPHILS NFR BLD AUTO: 77.2 % (ref 42.7–76)
NRBC BLD AUTO-RTO: 0.1 /100 WBC (ref 0–0.2)
PHOSPHATE SERPL-MCNC: 4.1 MG/DL (ref 2.5–4.5)
PLATELET # BLD AUTO: 379 10*3/MM3 (ref 140–450)
PMV BLD AUTO: 10.9 FL (ref 6–12)
POTASSIUM SERPL-SCNC: 4.3 MMOL/L (ref 3.5–5.2)
PTH-INTACT SERPL-MCNC: 231 PG/ML (ref 15–65)
RBC # BLD AUTO: 3.6 10*6/MM3 (ref 4.14–5.8)
SODIUM SERPL-SCNC: 137 MMOL/L (ref 136–145)
WBC NRBC COR # BLD: 9.33 10*3/MM3 (ref 3.4–10.8)

## 2023-04-10 DIAGNOSIS — K21.9 GASTROESOPHAGEAL REFLUX DISEASE WITHOUT ESOPHAGITIS: ICD-10-CM

## 2023-04-10 RX ORDER — OMEPRAZOLE 40 MG/1
40 CAPSULE, DELAYED RELEASE ORAL DAILY
Qty: 90 CAPSULE | Refills: 2 | Status: SHIPPED | OUTPATIENT
Start: 2023-04-10

## 2023-04-14 ENCOUNTER — PRIOR AUTHORIZATION (OUTPATIENT)
Dept: INTERNAL MEDICINE | Facility: CLINIC | Age: 53
End: 2023-04-14
Payer: COMMERCIAL

## 2023-04-14 NOTE — TELEPHONE ENCOUNTER
PA REQUIRED:    insulin degludec (TRESIBA FLEXTOUCH) 100 UNIT/ML solution pen-injector injection (10/22/2022)    INDEXED VIA ONBASE

## 2023-04-19 ENCOUNTER — TELEPHONE (OUTPATIENT)
Dept: CASE MANAGEMENT | Facility: OTHER | Age: 53
End: 2023-04-19
Payer: COMMERCIAL

## 2023-04-19 DIAGNOSIS — Z79.4 TYPE 2 DIABETES MELLITUS WITH BOTH EYES AFFECTED BY PROLIFERATIVE RETINOPATHY AND MACULAR EDEMA, WITH LONG-TERM CURRENT USE OF INSULIN: Primary | ICD-10-CM

## 2023-04-19 DIAGNOSIS — N18.4 STAGE 4 CHRONIC KIDNEY DISEASE: ICD-10-CM

## 2023-04-19 DIAGNOSIS — E11.3513 TYPE 2 DIABETES MELLITUS WITH BOTH EYES AFFECTED BY PROLIFERATIVE RETINOPATHY AND MACULAR EDEMA, WITH LONG-TERM CURRENT USE OF INSULIN: Primary | ICD-10-CM

## 2023-04-19 DIAGNOSIS — I10 ESSENTIAL HYPERTENSION: ICD-10-CM

## 2023-04-19 NOTE — TELEPHONE ENCOUNTER
Called patient and left message to return my call. My  name, reason for call and contact info was left on message.

## 2023-04-28 ENCOUNTER — OFFICE VISIT (OUTPATIENT)
Dept: INTERNAL MEDICINE | Facility: CLINIC | Age: 53
End: 2023-04-28
Payer: COMMERCIAL

## 2023-04-28 VITALS
OXYGEN SATURATION: 97 % | HEIGHT: 68 IN | DIASTOLIC BLOOD PRESSURE: 88 MMHG | BODY MASS INDEX: 25.73 KG/M2 | WEIGHT: 169.8 LBS | SYSTOLIC BLOOD PRESSURE: 160 MMHG | HEART RATE: 80 BPM | TEMPERATURE: 97.7 F

## 2023-04-28 DIAGNOSIS — R51.9 CHRONIC NONINTRACTABLE HEADACHE, UNSPECIFIED HEADACHE TYPE: ICD-10-CM

## 2023-04-28 DIAGNOSIS — E87.20 METABOLIC ACIDOSIS: ICD-10-CM

## 2023-04-28 DIAGNOSIS — H54.40 BLINDNESS OF LEFT EYE, UNSPECIFIED RIGHT EYE VISUAL IMPAIRMENT CATEGORY: ICD-10-CM

## 2023-04-28 DIAGNOSIS — E11.3513 TYPE 2 DIABETES MELLITUS WITH BOTH EYES AFFECTED BY PROLIFERATIVE RETINOPATHY AND MACULAR EDEMA, WITH LONG-TERM CURRENT USE OF INSULIN: ICD-10-CM

## 2023-04-28 DIAGNOSIS — Z00.00 ANNUAL PHYSICAL EXAM: Primary | ICD-10-CM

## 2023-04-28 DIAGNOSIS — R80.8 OTHER PROTEINURIA: ICD-10-CM

## 2023-04-28 DIAGNOSIS — I50.32 CHRONIC DIASTOLIC (CONGESTIVE) HEART FAILURE: ICD-10-CM

## 2023-04-28 DIAGNOSIS — N25.81 SECONDARY HYPERPARATHYROIDISM: ICD-10-CM

## 2023-04-28 DIAGNOSIS — E78.2 HYPERLIPEMIA, MIXED: ICD-10-CM

## 2023-04-28 DIAGNOSIS — I10 ESSENTIAL HYPERTENSION: ICD-10-CM

## 2023-04-28 DIAGNOSIS — Z79.4 TYPE 2 DIABETES MELLITUS WITH BOTH EYES AFFECTED BY PROLIFERATIVE RETINOPATHY AND MACULAR EDEMA, WITH LONG-TERM CURRENT USE OF INSULIN: ICD-10-CM

## 2023-04-28 DIAGNOSIS — N18.4 STAGE 4 CHRONIC KIDNEY DISEASE: ICD-10-CM

## 2023-04-28 DIAGNOSIS — F32.89 OTHER DEPRESSION: ICD-10-CM

## 2023-04-28 DIAGNOSIS — Z59.41 FOOD INSECURITY: ICD-10-CM

## 2023-04-28 DIAGNOSIS — G89.29 CHRONIC NONINTRACTABLE HEADACHE, UNSPECIFIED HEADACHE TYPE: ICD-10-CM

## 2023-04-28 LAB
BASOPHILS # BLD AUTO: 0.11 10*3/MM3 (ref 0–0.2)
BASOPHILS NFR BLD AUTO: 1.2 % (ref 0–1.5)
CHOLEST SERPL-MCNC: 120 MG/DL (ref 0–200)
DEPRECATED RDW RBC AUTO: 38.6 FL (ref 37–54)
EOSINOPHIL # BLD AUTO: 0.18 10*3/MM3 (ref 0–0.4)
EOSINOPHIL NFR BLD AUTO: 1.9 % (ref 0.3–6.2)
ERYTHROCYTE [DISTWIDTH] IN BLOOD BY AUTOMATED COUNT: 12.3 % (ref 12.3–15.4)
HBA1C MFR BLD: 7.6 % (ref 4.8–5.6)
HCT VFR BLD AUTO: 34.1 % (ref 37.5–51)
HDLC SERPL-MCNC: 26 MG/DL (ref 40–60)
HGB BLD-MCNC: 11.2 G/DL (ref 13–17.7)
IMM GRANULOCYTES # BLD AUTO: 0.06 10*3/MM3 (ref 0–0.05)
IMM GRANULOCYTES NFR BLD AUTO: 0.6 % (ref 0–0.5)
LDLC SERPL CALC-MCNC: 66 MG/DL (ref 0–100)
LDLC/HDLC SERPL: 2.35 {RATIO}
LYMPHOCYTES # BLD AUTO: 1.27 10*3/MM3 (ref 0.7–3.1)
LYMPHOCYTES NFR BLD AUTO: 13.3 % (ref 19.6–45.3)
MCH RBC QN AUTO: 28.8 PG (ref 26.6–33)
MCHC RBC AUTO-ENTMCNC: 32.8 G/DL (ref 31.5–35.7)
MCV RBC AUTO: 87.7 FL (ref 79–97)
MONOCYTES # BLD AUTO: 0.67 10*3/MM3 (ref 0.1–0.9)
MONOCYTES NFR BLD AUTO: 7 % (ref 5–12)
NEUTROPHILS NFR BLD AUTO: 7.26 10*3/MM3 (ref 1.7–7)
NEUTROPHILS NFR BLD AUTO: 76 % (ref 42.7–76)
NRBC BLD AUTO-RTO: 0 /100 WBC (ref 0–0.2)
PLATELET # BLD AUTO: 367 10*3/MM3 (ref 140–450)
PMV BLD AUTO: 10.9 FL (ref 6–12)
RBC # BLD AUTO: 3.89 10*6/MM3 (ref 4.14–5.8)
TRIGL SERPL-MCNC: 164 MG/DL (ref 0–150)
VLDLC SERPL-MCNC: 28 MG/DL (ref 5–40)
WBC NRBC COR # BLD: 9.55 10*3/MM3 (ref 3.4–10.8)

## 2023-04-28 PROCEDURE — 84443 ASSAY THYROID STIM HORMONE: CPT | Performed by: STUDENT IN AN ORGANIZED HEALTH CARE EDUCATION/TRAINING PROGRAM

## 2023-04-28 PROCEDURE — 80053 COMPREHEN METABOLIC PANEL: CPT | Performed by: STUDENT IN AN ORGANIZED HEALTH CARE EDUCATION/TRAINING PROGRAM

## 2023-04-28 PROCEDURE — 85025 COMPLETE CBC W/AUTO DIFF WBC: CPT | Performed by: STUDENT IN AN ORGANIZED HEALTH CARE EDUCATION/TRAINING PROGRAM

## 2023-04-28 PROCEDURE — 80061 LIPID PANEL: CPT | Performed by: STUDENT IN AN ORGANIZED HEALTH CARE EDUCATION/TRAINING PROGRAM

## 2023-04-28 PROCEDURE — 83036 HEMOGLOBIN GLYCOSYLATED A1C: CPT | Performed by: STUDENT IN AN ORGANIZED HEALTH CARE EDUCATION/TRAINING PROGRAM

## 2023-04-28 RX ORDER — DORZOLAMIDE HYDROCHLORIDE AND TIMOLOL MALEATE 20; 5 MG/ML; MG/ML
1 SOLUTION/ DROPS OPHTHALMIC 2 TIMES DAILY
Qty: 10 ML | Refills: 3 | Status: SHIPPED | OUTPATIENT
Start: 2023-04-28

## 2023-04-28 RX ORDER — ASPIRIN 81 MG/1
81 TABLET ORAL DAILY
Qty: 90 TABLET | Refills: 1 | Status: SHIPPED | OUTPATIENT
Start: 2023-04-28

## 2023-04-28 SDOH — ECONOMIC STABILITY - FOOD INSECURITY: FOOD INSECURITY: Z59.41

## 2023-04-28 NOTE — PROGRESS NOTES
"Chief Complaint  Annual Exam, Chronic Kidney Disease (Stage 4 -follow up ), Diabetes (T2dm ), and medication refills    Subjective          Rogelio Dee presents to [facility]  History of Present Illness    Seen by Dr. Francis 3/15/23.  Noted to have BP above goal that visit, but thought to be a blip and they are monitoring . Seeing again 5/15/2023.    Sporadically checks BP at home, and per his report running systolic 130 over 89-90.  He reports no chest pain, but reports feeling a \"flutter\" in his chest occasionally when taking a hot shower.    Blood sugar running 130-140.  Checks once a day, usually an hour after supper.    Reports that finally got disability.    He reports getting severe headaches in the center of his forehead about once a week with associated photophobia, that he treats with excedrin migraine.    He follows with ALONDRA Liao with pain management for chronic LBP.    He smokes between 10 cigarettes a day and 1 PPD.    Still using food genao.  Reports compliant with renal diet.  Of note, he says he mostly eats a bowel of cheerios for breakfast and two hots dogs for dinner (no lunch).    He denies SI.    Current Outpatient Medications   Medication Instructions   • Admelog 100 UNIT/ML injection ADMINISTER 15 UNITS UNDER THE SKIN BEFORE A MEAL AS NEEDED PER SLIDING SCALE   • amLODIPine (NORVASC) 10 MG tablet amlodipine 10 mg tablet   • aspirin 81 mg, Oral, Daily   • atorvastatin (LIPITOR) 20 mg, Oral, Daily   • carvedilol (COREG) 25 MG tablet TAKE 1 TABLET BY MOUTH TWICE DAILY WITH MEALS   • cetirizine (ZYRTEC) 5 mg, Oral, Daily   • Cholecalciferol 2,000 Units, Oral, Daily   • citalopram (CELEXA) 40 mg, Oral, Daily   • cloNIDine (CATAPRES) 0.1 MG tablet TAKE 1 TABLET BY MOUTH TWICE DAILY   • cyclobenzaprine (FLEXERIL) 10 MG tablet Every 8 Hours Scheduled   • dorzolamide-timolol (COSOPT) 22.3-6.8 MG/ML ophthalmic solution 1 drop, Both Eyes, 2 " "Times Daily   • ferrous sulfate 325 (65 FE) MG tablet FeroSul 325 mg (65 mg iron) tablet   TAKE 1 TABLET BY MOUTH TWICE DAILY   • furosemide (LASIX) 40 mg, Oral, Daily   • hydrALAZINE (APRESOLINE) 100 MG tablet TAKE 1 TABLET BY MOUTH THREE TIMES DAILY   • HYDROcodone-acetaminophen (NORCO) 7.5-325 MG per tablet Every 6 Hours   • insulin degludec (TRESIBA FLEXTOUCH) 20 Units, Subcutaneous, Daily   • Insulin Lispro (1 Unit Dial) (HUMALOG) 8 Units, Subcutaneous, Daily With Breakfast, Lunch & Dinner   • lisinopril (PRINIVIL,ZESTRIL) 20 mg, Oral, 2 Times Daily   • NIFEdipine XL (PROCARDIA XL) 30 MG 24 hr tablet TAKE 2 TABLETS BY MOUTH EVERY DAY   • omeprazole (PRILOSEC) 40 mg, Oral, Daily   • rOPINIRole (REQUIP) 0.25 MG tablet TAKE 1 TABLET BY MOUTH EVERY NIGHT 1 HOUR BEFORE BEDTIME   • sodium bicarbonate 650 mg, Oral, 3 Times Daily   • sodium polystyrene (KAYEXALATE) 15 GM/60ML suspension SPS (with sorbitol) 15 gram-20 gram/60 mL oral suspension   • tamsulosin (FLOMAX) 0.4 mg, Oral, Daily       The following portions of the patient's history were reviewed and updated as appropriate: allergies, current medications, past family history, past medical history, past social history, past surgical history, and problem list.    Objective   Vital Signs:   /88 (BP Location: Right arm) Comment: manual  Pulse 80   Temp 97.7 °F (36.5 °C) (Temporal)   Ht 172.7 cm (68\")   Wt 77 kg (169 lb 12.8 oz)   SpO2 97%   BMI 25.82 kg/m²     Wt Readings from Last 3 Encounters:   04/28/23 77 kg (169 lb 12.8 oz)   01/25/23 79.6 kg (175 lb 6.4 oz)   11/15/22 78 kg (172 lb)     BP Readings from Last 3 Encounters:   04/28/23 160/88   01/25/23 142/80   11/15/22 (!) 188/92     Physical Exam  Vitals reviewed.   Constitutional:       General: He is not in acute distress.     Appearance: Normal appearance. He is not ill-appearing, toxic-appearing or diaphoretic.   HENT:      Head: Normocephalic and atraumatic.      Right Ear: External ear " normal.      Left Ear: External ear normal.   Eyes:      Conjunctiva/sclera: Conjunctivae normal.   Cardiovascular:      Rate and Rhythm: Normal rate and regular rhythm.      Pulses: Normal pulses.      Heart sounds: Murmur heard.     No friction rub. No gallop.      Comments: II/VI systolic murmur, most clearly audible LUSB  Pulmonary:      Effort: Pulmonary effort is normal. No respiratory distress.      Breath sounds: Normal breath sounds. No stridor. No wheezing, rhonchi or rales.   Chest:      Chest wall: No tenderness.   Abdominal:      General: Abdomen is flat.      Palpations: Abdomen is soft. There is no mass.      Tenderness: There is no abdominal tenderness.   Musculoskeletal:      Right lower leg: No edema.      Left lower leg: No edema.   Skin:     General: Skin is warm and dry.   Neurological:      General: No focal deficit present.      Mental Status: He is alert. Mental status is at baseline.   Psychiatric:         Mood and Affect: Mood normal.         Behavior: Behavior normal.         Thought Content: Thought content normal.         Judgment: Judgment normal.         Result Review :   The following data was reviewed by: Simeon Lomeli MD on 04/28/2023:  Common labs        10/21/2022    15:24 1/25/2023    15:14 3/13/2023    14:15   Common Labs   Glucose 248   476   206     BUN 72   61   49     Creatinine 3.46   4.13   3.80     Sodium 131   131   137     Potassium 4.5   4.4   4.3     Chloride 95   100   103     Calcium 9.3   9.3   9.0     Albumin  4.1   4.0     Total Bilirubin  0.2      Alkaline Phosphatase  112      AST (SGOT)  10      ALT (SGPT)  14      WBC  8.79   9.33     Hemoglobin  10.0   10.5     Hematocrit  31.9   31.9     Platelets  365   379     Hemoglobin A1C 10.00   8.80               Lab Results   Component Value Date    INR 0.91 (L) 09/02/2020    BILIRUBINUR Negative 03/13/2023       Procedures        Assessment and Plan    Diagnoses and all orders for this visit:    1. Annual  physical exam (Primary)  -     CBC & Differential  -     Comprehensive Metabolic Panel  -     Hemoglobin A1c  -     Lipid Panel  -     TSH    2. Stage 4 chronic kidney disease  -     Comprehensive Metabolic Panel    3. Type 2 diabetes mellitus with both eyes affected by proliferative retinopathy and macular edema, with long-term current use of insulin  -     Comprehensive Metabolic Panel  -     Hemoglobin A1c    4. Essential hypertension  -     Comprehensive Metabolic Panel    5. Other depression    6. Hyperlipemia, mixed  -     Lipid Panel    7. Metabolic acidosis  -     Comprehensive Metabolic Panel    8. Chronic diastolic (congestive) heart failure    9. Food insecurity    10. Blindness of left eye, unspecified right eye visual impairment category    11. Other proteinuria    12. Secondary hyperparathyroidism  -     Cholecalciferol 50 MCG (2000 UT) capsule; Take 1 capsule by mouth Daily.  Dispense: 90 each; Refill: 3    13. Chronic nonintractable headache, unspecified headache type    Other orders  -     aspirin 81 MG EC tablet; Take 1 tablet by mouth Daily.  Dispense: 90 tablet; Refill: 1  -     dorzolamide-timolol (COSOPT) 22.3-6.8 MG/ML ophthalmic solution; Administer 1 drop to both eyes 2 (Two) Times a Day.  Dispense: 10 mL; Refill: 3      CKD:  -I did talk with Mr. Dee at length today regarding my concerns for his steadily worsening renal function  -I counseled him on the consequences of renal failure, including volume overload, as well pericarditis, and that in the event of emergent dialysis he would need a foreign body inserted into a great vessel for dialysis  -in contrast, with AV fistula placement, dialysis could be done in a non-emergent manner without the risks (infection risk and otherwise) of emergent dialysis  -Mr. Dee at this time is not interested in dialysis    HTN:  -above goal  -reviewed low sodium dietary counseling today  -will defer to nephrology    Depression:  -denies SI  -I  spoke with him at length today regarding my concerns that he is suffering from depression, and that this is complicating management of his other issues    Headaches:  -I did  him to avoid NSAIDs (ok for tylenol as needed)  -I'm hesitant to trial triptans or other abortive medications given his severe CKD and cardiovascular disease    Health Maintenance:  -nutritional counseling today regarding renal diet  -exercise counseling, recommending at least 2.5 hours of moderate exercise weekly      Medications Discontinued During This Encounter   Medication Reason   • Cholecalciferol 50 MCG (2000 UT) capsule Reorder   • dorzolamide-timolol (COSOPT) 22.3-6.8 MG/ML ophthalmic solution Reorder   • aspirin 81 MG EC tablet Reorder          Follow Up   Return in about 3 months (around 7/28/2023) for CKD.  Patient was given instructions and counseling regarding his condition or for health maintenance advice. Please see specific information pulled into the AVS if appropriate.       Simeon Lomeli MD  04/28/23  16:24 EDT

## 2023-04-29 LAB
ALBUMIN SERPL-MCNC: 4.3 G/DL (ref 3.5–5.2)
ALBUMIN/GLOB SERPL: 1.3 G/DL
ALP SERPL-CCNC: 113 U/L (ref 39–117)
ALT SERPL W P-5'-P-CCNC: 8 U/L (ref 1–41)
ANION GAP SERPL CALCULATED.3IONS-SCNC: 13.8 MMOL/L (ref 5–15)
AST SERPL-CCNC: <5 U/L (ref 1–40)
BILIRUB SERPL-MCNC: 0.3 MG/DL (ref 0–1.2)
BUN SERPL-MCNC: 62 MG/DL (ref 6–20)
BUN/CREAT SERPL: 17.2 (ref 7–25)
CALCIUM SPEC-SCNC: 10 MG/DL (ref 8.6–10.5)
CHLORIDE SERPL-SCNC: 101 MMOL/L (ref 98–107)
CO2 SERPL-SCNC: 19.2 MMOL/L (ref 22–29)
CREAT SERPL-MCNC: 3.6 MG/DL (ref 0.76–1.27)
EGFRCR SERPLBLD CKD-EPI 2021: 19.5 ML/MIN/1.73
GLOBULIN UR ELPH-MCNC: 3.3 GM/DL
GLUCOSE SERPL-MCNC: 215 MG/DL (ref 65–99)
POTASSIUM SERPL-SCNC: 3.8 MMOL/L (ref 3.5–5.2)
PROT SERPL-MCNC: 7.6 G/DL (ref 6–8.5)
SODIUM SERPL-SCNC: 134 MMOL/L (ref 136–145)
TSH SERPL DL<=0.05 MIU/L-ACNC: 3.13 UIU/ML (ref 0.27–4.2)

## 2023-05-01 ENCOUNTER — TELEPHONE (OUTPATIENT)
Dept: INTERNAL MEDICINE | Facility: CLINIC | Age: 53
End: 2023-05-01
Payer: COMMERCIAL

## 2023-05-01 NOTE — TELEPHONE ENCOUNTER
----- Message from Simeon Lomeli MD sent at 4/29/2023 11:36 AM EDT -----  CMP shows stable renal function compared to a month ago.  This is consistent with CKD stage 4.  As we discussed at clinic, I agree with nephrology and strongly recommend preparing for dialysis as you are very likely to require dialysis in the medium term future.    Blood sugar is elevated at 215.    Lipids notable for mildly elevated triglycerides.    A1c is improved a bit from 8.8% to 7.6%.  Our goal is less than 7%.  I know you missed your appointment with Ira Larson, and I'm happy to place a new referral.  I think getting better control of your diabetes would help preserve your kidney function.    CBC notable for mild, stable anemia.    TSH is within normal limits.

## 2023-05-03 ENCOUNTER — PATIENT OUTREACH (OUTPATIENT)
Dept: CASE MANAGEMENT | Facility: OTHER | Age: 53
End: 2023-05-03
Payer: COMMERCIAL

## 2023-05-03 DIAGNOSIS — I10 ESSENTIAL HYPERTENSION: ICD-10-CM

## 2023-05-03 DIAGNOSIS — N18.4 STAGE 4 CHRONIC KIDNEY DISEASE: ICD-10-CM

## 2023-05-03 DIAGNOSIS — I50.32 CHRONIC DIASTOLIC (CONGESTIVE) HEART FAILURE: ICD-10-CM

## 2023-05-03 DIAGNOSIS — Z79.4 TYPE 2 DIABETES MELLITUS WITH BOTH EYES AFFECTED BY PROLIFERATIVE RETINOPATHY AND MACULAR EDEMA, WITH LONG-TERM CURRENT USE OF INSULIN: Primary | ICD-10-CM

## 2023-05-03 DIAGNOSIS — E11.3513 TYPE 2 DIABETES MELLITUS WITH BOTH EYES AFFECTED BY PROLIFERATIVE RETINOPATHY AND MACULAR EDEMA, WITH LONG-TERM CURRENT USE OF INSULIN: Primary | ICD-10-CM

## 2023-05-03 NOTE — OUTREACH NOTE
AMBULATORY CASE MANAGEMENT NOTE    Name and Relationship of Patient/Support Person: Rogelio Dee - Self    [facility] Interim Update        After extensive chart review CM established that the patients renal functions are becoming worse. PCP has educated the patient about the need for dialysis and the patient has declined. PCP also educated about the potential issues that may arise if emergent dialysis was required. Patient still declined per chart. CM reached out to the patient. Patient stated that he was doing ok at this time. Stated he did not require any meds refilled. I shared that I had reviewed where the PCP and he had discussed much needed dialysis. CM shared that he realized that this was a big decision to make and that there were many questions to be answered and  encouraged the patient to ask all the questions that he had. Cm stated that the office and CM staff would be there to support him in this decision making process. He stated that he did not want to have the procedure but knew he would need to . Patient stated that he would reach out to CM with questions and advise when he wanted to start Dialysis . PCP has been advised. Chart reviewed , meds reviewed, patient stated no further needs at this time.All future appts reviewed . All questions were answered and contact information provided . Future outreach scheduled .          Education Documentation  No documentation found.        JULES HUIZAR  Ambulatory Case Management    5/3/2023, 12:17 EDT

## 2023-05-12 NOTE — OUTREACH NOTE
AMBULATORY CASE MANAGEMENT NOTE    Name and Relationship of Patient/Support Person: Rogelio Dee - Self    CCM Interim Update        CM made outreach to the patient . Patient stated that he was feeling a little better . Patient seemed to be very upbeat during call. He stated that he had been having issues with sever heart burn X1 week because he had not taken his meds. Stated that he just had not refilled them ,. Stated that he still suffers from back pain but is currently being treated by pain management  And the treatment seems to help. He did state that he was having an issue with getting one of his meds filled but it was a pharmacy issue and that he would take care of it . I asked about food supply and PT stated that he was getting food from a local food pantry and that his father brings food when he can. I asked if he currently enough to eat and he stated he did. I asked if he had applied for SNAP yet and he stated he had not because he had lost his ID and was in the process of getting it back. I asked if he had talked with MSW and he stated that he had not because he had been unable to be reached , declined call at this time stated that he was working on it. Upon review of the chart the labs show that the patients A1C has increased to 10.0  . Patient was a no show for his DM clinic visit , patient stated he would reschedule. Unsure how compliant the patient is with diet or meds at this time. ..Chart reviewed , meds reviewed, patient stated no further needs at this time.All future appts reviewed . All questions were answered and contact information provided . Future outreach scheduled .         Education Documentation  No documentation found.        JULES HUIZAR  Ambulatory Case Management    1/16/2023, 09:23 EST   unable to assess

## 2023-05-30 ENCOUNTER — PATIENT OUTREACH (OUTPATIENT)
Dept: CASE MANAGEMENT | Facility: OTHER | Age: 53
End: 2023-05-30

## 2023-05-30 DIAGNOSIS — E11.3513 TYPE 2 DIABETES MELLITUS WITH BOTH EYES AFFECTED BY PROLIFERATIVE RETINOPATHY AND MACULAR EDEMA, WITH LONG-TERM CURRENT USE OF INSULIN: Primary | ICD-10-CM

## 2023-05-30 DIAGNOSIS — I50.32 CHRONIC DIASTOLIC (CONGESTIVE) HEART FAILURE: ICD-10-CM

## 2023-05-30 DIAGNOSIS — Z79.4 TYPE 2 DIABETES MELLITUS WITH BOTH EYES AFFECTED BY PROLIFERATIVE RETINOPATHY AND MACULAR EDEMA, WITH LONG-TERM CURRENT USE OF INSULIN: Primary | ICD-10-CM

## 2023-05-30 DIAGNOSIS — I10 ESSENTIAL HYPERTENSION: ICD-10-CM

## 2023-05-30 NOTE — OUTREACH NOTE
AMBULATORY CASE MANAGEMENT NOTE    Name and Relationship of Patient/Support Person:  -     [facility] End of Month Documentation    This Chronic Medical Management Care Plan for Rogelio Dee, 53 y.o. male, has been monitored and managed; reviewed and a new plan of care implemented for the month of May.  A cumulative time of 20  minutes was spent on this patient record this month, including chart review; phone call with patient; electronic communication with primary care provider.    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, bilateral; Stage 4 CKD; Blindness of left eye; Diabetic neuropathy; Suicidal ideation; Depression; Housing situation unstable; Smoker; Chronic diastolic CHF; Essential hypertension; Hyperlipemia, mixed; Chronic pain disorder; Combined forms of age-related cataract, bilateral; Displacement of lumbar intervertebral disc without myelopathy; Ocular hypertension, bilateral; Cigarette nicotine dependence without complication; History of suicidal ideation; Overweight (BMI 25.0-29.9); Food insecurity; and Lumbar spondylosis on their problem list., the following items were addressed: medical records; medications and any changes can be found within the plan section of the note.  A detailed listing of time spent for chronic care management is tracked within each outreach encounter.  Current medications include:  has a current medication list which includes the following prescription(s): admelog, amlodipine, aspirin, atorvastatin, carvedilol, cetirizine, cholecalciferol, citalopram, clonidine, cyclobenzaprine, dorzolamide-timolol, ferrous sulfate, furosemide, hydralazine, hydrocodone-acetaminophen, insulin degludec, insulin lispro (1 unit dial), lisinopril, nifedipine xl, omeprazole, ropinirole, sodium bicarbonate, sodium polystyrene, and tamsulosin. and the patient is reported to be patient is noncompliant with medication protocol,  Medications are  reported to be non-effective in controlling symptoms and changes have been made to the medication protocol, [place]ney function reduced.  All notes on chart for PCP to review.    The patient was monitored remotely for blood glucose; blood pressure.    The patient's physical needs include:  help taking medications as prescribed; medication education; needs assistance with ADLs.     The patient's mental support needs include:  increased support; needs met    The patient's cognitive support needs include:  not applicable    The patient's psychosocial support needs include:  need for increased support; needs met    The patient's functional needs include: needs met    The patient's environmental needs include:  not applicable, N/A    Care Plan overall comments:  Care plan updated,    Refer to previous outreach notes for more information on the areas listed above.    Monthly Billing Diagnoses  (E11.3513,  Z79.4) Type 2 diabetes mellitus with both eyes affected by proliferative retinopathy and macular edema, with long-term current use of insulin    (I10) Essential hypertension    (I50.32) Chronic diastolic CHF    Medications   · Medications have been reconciled    Care Plan progress this month:      Recently Modified Care Plans Updates made since 4/29/2023 12:00 AM    No recently modified care plans.          · Current Specialty Plan of Care Status not yet initiated    Instructions   · Patient was provided an electronic copy of care plan  · CCM services were explained and offered and patient has accepted these services.  · Patient has given their written consent to receive CCM services and understands that this includes the authorization of electronic communication of medical information with the other treating providers.  · Patient understands that they may stop CCM services at any time and these changes will be effective at the end of the calendar month and will effectively revocate the agreement of CCM services.  · Patient  understands that only one practitioner can furnish and be paid for CCM services during one calendar month.  Patient also understands that there may be co-payment or deductible fees in association with CCM services.  · Patient will continue with at least monthly follow-up calls with the Ambulatory .    JULES HUIZAR  Ambulatory Case Management    5/30/2023, 14:50 EDT    Education Documentation  No documentation found.        JULES HUIZAR  Ambulatory Case Management    5/30/2023, 14:50 EDT

## 2023-06-08 RX ORDER — NIFEDIPINE 30 MG/1
TABLET, EXTENDED RELEASE ORAL
Qty: 180 TABLET | Refills: 1 | Status: SHIPPED | OUTPATIENT
Start: 2023-06-08

## 2023-07-25 ENCOUNTER — PATIENT OUTREACH (OUTPATIENT)
Dept: CASE MANAGEMENT | Facility: OTHER | Age: 53
End: 2023-07-25
Payer: COMMERCIAL

## 2023-07-25 NOTE — OUTREACH NOTE
SDOH updated and reviewed with the patient during this program:  Financial Resource Strain: Medium Risk    Difficulty of Paying Living Expenses: Somewhat hard      Food Insecurity: Food Insecurity Present    Worried About Running Out of Food in the Last Year: Sometimes true    Ran Out of Food in the Last Year: Sometimes true      Transportation Needs: No Transportation Needs    Lack of Transportation (Medical): No    Lack of Transportation (Non-Medical): No      Housing Stability: Low Risk     Unable to Pay for Housing in the Last Year: No    Number of Places Lived in the Last Year: 1    Unstable Housing in the Last Year: No     Patient Outreach    MSW spoke with patient and provided information on how to apply for food stamp assistance. Patient thankful for information and to call MSW if there are further needs.    Skylar TAYLOR -   Ambulatory Case Management    7/25/2023, 13:59 EDT

## 2023-07-31 ENCOUNTER — PATIENT OUTREACH (OUTPATIENT)
Dept: CASE MANAGEMENT | Facility: OTHER | Age: 53
End: 2023-07-31
Payer: COMMERCIAL

## 2023-07-31 DIAGNOSIS — N18.4 STAGE 4 CHRONIC KIDNEY DISEASE: ICD-10-CM

## 2023-07-31 DIAGNOSIS — E11.3513 TYPE 2 DIABETES MELLITUS WITH BOTH EYES AFFECTED BY PROLIFERATIVE RETINOPATHY AND MACULAR EDEMA, WITH LONG-TERM CURRENT USE OF INSULIN: Primary | ICD-10-CM

## 2023-07-31 DIAGNOSIS — I50.32 CHRONIC DIASTOLIC (CONGESTIVE) HEART FAILURE: ICD-10-CM

## 2023-07-31 DIAGNOSIS — I10 ESSENTIAL HYPERTENSION: ICD-10-CM

## 2023-07-31 DIAGNOSIS — Z79.4 TYPE 2 DIABETES MELLITUS WITH BOTH EYES AFFECTED BY PROLIFERATIVE RETINOPATHY AND MACULAR EDEMA, WITH LONG-TERM CURRENT USE OF INSULIN: Primary | ICD-10-CM

## 2023-08-04 ENCOUNTER — OFFICE VISIT (OUTPATIENT)
Dept: INTERNAL MEDICINE | Facility: CLINIC | Age: 53
End: 2023-08-04
Payer: COMMERCIAL

## 2023-08-04 VITALS
SYSTOLIC BLOOD PRESSURE: 131 MMHG | TEMPERATURE: 98.4 F | BODY MASS INDEX: 25.46 KG/M2 | WEIGHT: 168 LBS | HEIGHT: 68 IN | OXYGEN SATURATION: 96 % | HEART RATE: 88 BPM | DIASTOLIC BLOOD PRESSURE: 86 MMHG

## 2023-08-04 DIAGNOSIS — I10 ESSENTIAL HYPERTENSION: ICD-10-CM

## 2023-08-04 DIAGNOSIS — E11.3513 TYPE 2 DIABETES MELLITUS WITH BOTH EYES AFFECTED BY PROLIFERATIVE RETINOPATHY AND MACULAR EDEMA, WITH LONG-TERM CURRENT USE OF INSULIN: ICD-10-CM

## 2023-08-04 DIAGNOSIS — F32.89 OTHER DEPRESSION: ICD-10-CM

## 2023-08-04 DIAGNOSIS — Z79.4 TYPE 2 DIABETES MELLITUS WITH BOTH EYES AFFECTED BY PROLIFERATIVE RETINOPATHY AND MACULAR EDEMA, WITH LONG-TERM CURRENT USE OF INSULIN: ICD-10-CM

## 2023-08-04 DIAGNOSIS — E87.20 METABOLIC ACIDOSIS: ICD-10-CM

## 2023-08-04 DIAGNOSIS — Z59.41 FOOD INSECURITY: ICD-10-CM

## 2023-08-04 DIAGNOSIS — N18.4 STAGE 4 CHRONIC KIDNEY DISEASE: Primary | ICD-10-CM

## 2023-08-04 DIAGNOSIS — H54.40 BLINDNESS OF LEFT EYE, UNSPECIFIED RIGHT EYE VISUAL IMPAIRMENT CATEGORY: ICD-10-CM

## 2023-08-04 DIAGNOSIS — I50.32 CHRONIC DIASTOLIC (CONGESTIVE) HEART FAILURE: ICD-10-CM

## 2023-08-04 LAB
ALBUMIN SERPL-MCNC: 3.9 G/DL (ref 3.5–5.2)
ALBUMIN/GLOB SERPL: 1.3 G/DL
ALP SERPL-CCNC: 105 U/L (ref 39–117)
ALT SERPL W P-5'-P-CCNC: 6 U/L (ref 1–41)
ANION GAP SERPL CALCULATED.3IONS-SCNC: 12.8 MMOL/L (ref 5–15)
AST SERPL-CCNC: 8 U/L (ref 1–40)
BASOPHILS # BLD AUTO: 0.09 10*3/MM3 (ref 0–0.2)
BASOPHILS NFR BLD AUTO: 0.9 % (ref 0–1.5)
BILIRUB SERPL-MCNC: 0.3 MG/DL (ref 0–1.2)
BUN SERPL-MCNC: 43 MG/DL (ref 6–20)
BUN/CREAT SERPL: 11.4 (ref 7–25)
CALCIUM SPEC-SCNC: 9.4 MG/DL (ref 8.6–10.5)
CHLORIDE SERPL-SCNC: 102 MMOL/L (ref 98–107)
CO2 SERPL-SCNC: 19.2 MMOL/L (ref 22–29)
CREAT SERPL-MCNC: 3.76 MG/DL (ref 0.76–1.27)
DEPRECATED RDW RBC AUTO: 40.4 FL (ref 37–54)
EGFRCR SERPLBLD CKD-EPI 2021: 18.5 ML/MIN/1.73
EOSINOPHIL # BLD AUTO: 0.26 10*3/MM3 (ref 0–0.4)
EOSINOPHIL NFR BLD AUTO: 2.7 % (ref 0.3–6.2)
ERYTHROCYTE [DISTWIDTH] IN BLOOD BY AUTOMATED COUNT: 12 % (ref 12.3–15.4)
GLOBULIN UR ELPH-MCNC: 2.9 GM/DL
GLUCOSE SERPL-MCNC: 191 MG/DL (ref 65–99)
HBA1C MFR BLD: 7.5 % (ref 4.8–5.6)
HCT VFR BLD AUTO: 30.4 % (ref 37.5–51)
HGB BLD-MCNC: 9.9 G/DL (ref 13–17.7)
IMM GRANULOCYTES # BLD AUTO: 0.06 10*3/MM3 (ref 0–0.05)
IMM GRANULOCYTES NFR BLD AUTO: 0.6 % (ref 0–0.5)
LYMPHOCYTES # BLD AUTO: 1.14 10*3/MM3 (ref 0.7–3.1)
LYMPHOCYTES NFR BLD AUTO: 11.9 % (ref 19.6–45.3)
MCH RBC QN AUTO: 29.7 PG (ref 26.6–33)
MCHC RBC AUTO-ENTMCNC: 32.6 G/DL (ref 31.5–35.7)
MCV RBC AUTO: 91.3 FL (ref 79–97)
MONOCYTES # BLD AUTO: 0.63 10*3/MM3 (ref 0.1–0.9)
MONOCYTES NFR BLD AUTO: 6.6 % (ref 5–12)
NEUTROPHILS NFR BLD AUTO: 7.39 10*3/MM3 (ref 1.7–7)
NEUTROPHILS NFR BLD AUTO: 77.3 % (ref 42.7–76)
NRBC BLD AUTO-RTO: 0 /100 WBC (ref 0–0.2)
PLATELET # BLD AUTO: 355 10*3/MM3 (ref 140–450)
PMV BLD AUTO: 11 FL (ref 6–12)
POTASSIUM SERPL-SCNC: 4 MMOL/L (ref 3.5–5.2)
PROT SERPL-MCNC: 6.8 G/DL (ref 6–8.5)
RBC # BLD AUTO: 3.33 10*6/MM3 (ref 4.14–5.8)
SODIUM SERPL-SCNC: 134 MMOL/L (ref 136–145)
WBC NRBC COR # BLD: 9.57 10*3/MM3 (ref 3.4–10.8)

## 2023-08-04 PROCEDURE — 85025 COMPLETE CBC W/AUTO DIFF WBC: CPT | Performed by: STUDENT IN AN ORGANIZED HEALTH CARE EDUCATION/TRAINING PROGRAM

## 2023-08-04 PROCEDURE — 83036 HEMOGLOBIN GLYCOSYLATED A1C: CPT | Performed by: STUDENT IN AN ORGANIZED HEALTH CARE EDUCATION/TRAINING PROGRAM

## 2023-08-04 PROCEDURE — 80053 COMPREHEN METABOLIC PANEL: CPT | Performed by: STUDENT IN AN ORGANIZED HEALTH CARE EDUCATION/TRAINING PROGRAM

## 2023-08-04 SDOH — ECONOMIC STABILITY - FOOD INSECURITY: FOOD INSECURITY: Z59.41

## 2023-08-07 ENCOUNTER — TELEPHONE (OUTPATIENT)
Dept: INTERNAL MEDICINE | Facility: CLINIC | Age: 53
End: 2023-08-07
Payer: COMMERCIAL

## 2023-08-07 NOTE — TELEPHONE ENCOUNTER
Attempted to call patient. No answer. Left message to return call.       HUB OK TO READ/ADVISE     ----- Message from Simeon Lomeli MD sent at 8/7/2023  7:59 AM EDT -----  Hemoglobin A1c is 7.5%.     CMP shows your renal function hasn't appreciably changed compared to your last visit.  Your CKD is quite advanced, and I do strongly recommend preparing for future dialysis as we discussed in clinic.     CBC notable for mild anemia.

## 2023-08-07 NOTE — TELEPHONE ENCOUNTER
----- Message from Simeon Lomeli MD sent at 8/7/2023  7:59 AM EDT -----  Hemoglobin A1c is 7.5%.    CMP shows your renal function hasn't appreciably changed compared to your last visit.  Your CKD is quite advanced, and I do strongly recommend preparing for future dialysis as we discussed in clinic.    CBC notable for mild anemia.

## 2023-08-09 ENCOUNTER — PATIENT OUTREACH (OUTPATIENT)
Dept: CASE MANAGEMENT | Facility: OTHER | Age: 53
End: 2023-08-09
Payer: COMMERCIAL

## 2023-08-09 DIAGNOSIS — Z79.4 TYPE 2 DIABETES MELLITUS WITH BOTH EYES AFFECTED BY PROLIFERATIVE RETINOPATHY AND MACULAR EDEMA, WITH LONG-TERM CURRENT USE OF INSULIN: Primary | ICD-10-CM

## 2023-08-09 DIAGNOSIS — E11.3513 TYPE 2 DIABETES MELLITUS WITH BOTH EYES AFFECTED BY PROLIFERATIVE RETINOPATHY AND MACULAR EDEMA, WITH LONG-TERM CURRENT USE OF INSULIN: Primary | ICD-10-CM

## 2023-08-09 DIAGNOSIS — I10 ESSENTIAL HYPERTENSION: ICD-10-CM

## 2023-08-09 DIAGNOSIS — I50.32 CHRONIC DIASTOLIC (CONGESTIVE) HEART FAILURE: ICD-10-CM

## 2023-08-09 NOTE — OUTREACH NOTE
AMBULATORY CASE MANAGEMENT NOTE    Name and Relationship of Patient/Support Person: Rogelio Dee S - Self    CCM Interim Update      CM reached out to the patient . He stated that he was doing ok today . Patient stated that all meds were refilled as needed at last visit. Patient stated that he routinely checked his blood sugar at home and it woul range from 120's to 210's most days. We discussed the patients most recent A1c which is 7.5. CM explained that even though this reading was not terrable it was still very high considering the added strain it place on his body , patient stated he understood and stated he had tried to reduce the amount of sugar intake by watching his food intake better. The patient is still and active smoker and is not ready to stop at this time. Patient states he smokes over $200.00 monthly. Due to the patients diagnosis of kidney disease his PCP has discussed the possibility of dialysis in the future . The patient stated he would like more information about dialysis CM to follow up with more education material for patient. Patient stated that his PBP has been slightly elevated , will continue to watch . Chart reviewed , meds reviewed, patient stated no further needs at this time.All future appts reviewed . All questions were answered and contact information provided . Future outreach scheduled .   Care Coordination    CM reached back out to the patient and shared with him a web sight that provides educational videos for patients personal viewing.   [email or web address]. Patient verbalized understanding.                  Education Documentation  No documentation found.        JULES HUIZAR  Ambulatory Case Management    8/9/2023, 09:50 EDT

## 2023-08-31 ENCOUNTER — PATIENT OUTREACH (OUTPATIENT)
Dept: CASE MANAGEMENT | Facility: OTHER | Age: 53
End: 2023-08-31
Payer: COMMERCIAL

## 2023-08-31 DIAGNOSIS — E11.3513 TYPE 2 DIABETES MELLITUS WITH BOTH EYES AFFECTED BY PROLIFERATIVE RETINOPATHY AND MACULAR EDEMA, WITH LONG-TERM CURRENT USE OF INSULIN: Primary | ICD-10-CM

## 2023-08-31 DIAGNOSIS — Z79.4 TYPE 2 DIABETES MELLITUS WITH BOTH EYES AFFECTED BY PROLIFERATIVE RETINOPATHY AND MACULAR EDEMA, WITH LONG-TERM CURRENT USE OF INSULIN: Primary | ICD-10-CM

## 2023-08-31 DIAGNOSIS — I10 ESSENTIAL HYPERTENSION: ICD-10-CM

## 2023-08-31 NOTE — OUTREACH NOTE
AMBULATORY CASE MANAGEMENT NOTE    Name and Relationship of Patient/Support Person:  -     [facility] End of Month Documentation    This Chronic Medical Management Care Plan for Rogelio Dee, 53 y.o. male, has been monitored and managed; reviewed and a new plan of care implemented for the month of August.  A cumulative time of 23  minutes was spent on this patient record this month, including chart review; phone call with patient; electronic communication with other providers.    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, bilateral; Stage 4 CKD; Blindness of left eye; Diabetic neuropathy; Suicidal ideation; Depression; Housing situation unstable; Smoker; Chronic diastolic CHF; Essential hypertension; Hyperlipemia, mixed; Chronic pain disorder; Combined forms of age-related cataract, bilateral; Displacement of lumbar intervertebral disc without myelopathy; Ocular hypertension, bilateral; Cigarette nicotine dependence without complication; History of suicidal ideation; Overweight (BMI 25.0-29.9); Food insecurity; and Lumbar spondylosis on their problem list., the following items were addressed: medical records; medications and any changes can be found within the plan section of the note.  A detailed listing of time spent for chronic care management is tracked within each outreach encounter.  Current medications include:  has a current medication list which includes the following prescription(s): admelog, amlodipine, aspirin, atorvastatin, carvedilol, cetirizine, cholecalciferol, citalopram, clonidine, cyclobenzaprine, dorzolamide-timolol, ferrous sulfate, furosemide, hydralazine, hydrocodone-acetaminophen, insulin degludec, insulin lispro (1 unit dial), lisinopril, nifedipine xl, omeprazole, ropinirole, sodium bicarbonate, sodium polystyrene, and tamsulosin. and the patient is reported to be patient is noncompliant with medication protocol, unsure,  Medications are  reported to be effective, K[place]ney function reduced, A1c high.  Regarding these diagnoses, referrals were made to the following provider(s):  .  All notes on chart for PCP to review.    The patient was monitored remotely for blood glucose; blood pressure; mood & behavior.    The patient's physical needs include:  help taking medications as prescribed; medication education; needs assistance with ADLs.     The patient's mental support needs include:  increased support; needs met    The patient's cognitive support needs include:  not applicable    The patient's psychosocial support needs include:  need for increased support; needs met    The patient's functional needs include: needs met    The patient's environmental needs include:  not applicable, N/A    Care Plan overall comments:  Care plan updated,    Refer to previous outreach notes for more information on the areas listed above.    Monthly Billing Diagnoses  (E11.3513,  Z79.4) Type 2 diabetes mellitus with both eyes affected by proliferative retinopathy and macular edema, with long-term current use of insulin    (I10) Essential hypertension    Medications   Medications have been reconciled    Care Plan progress this month:      Recently Modified Care Plans Updates made since 7/31/2023 12:00 AM      No recently modified care plans.            Current Specialty Plan of Care Status not yet initiated    Instructions   Patient was provided an electronic copy of care plan  CCM services were explained and offered and patient has accepted these services.  Patient has given their written consent to receive CCM services and understands that this includes the authorization of electronic communication of medical information with the other treating providers.  Patient understands that they may stop CCM services at any time and these changes will be effective at the end of the calendar month and will effectively revocate the agreement of CCM services.  Patient understands that  only one practitioner can furnish and be paid for CCM services during one calendar month.  Patient also understands that there may be co-payment or deductible fees in association with CCM services.  Patient will continue with at least monthly follow-up calls with the Ambulatory .    JULES HUIZAR  Ambulatory Case Management    8/31/2023, 09:42 EDT    Education Documentation  No documentation found.        JULES HUIZAR  Ambulatory Case Management    8/31/2023, 09:42 EDT

## 2023-09-29 DIAGNOSIS — I10 ESSENTIAL HYPERTENSION: ICD-10-CM

## 2023-09-29 DIAGNOSIS — F32.89 OTHER DEPRESSION: ICD-10-CM

## 2023-09-29 RX ORDER — HYDRALAZINE HYDROCHLORIDE 100 MG/1
TABLET, FILM COATED ORAL
Qty: 270 TABLET | Refills: 2 | Status: SHIPPED | OUTPATIENT
Start: 2023-09-29

## 2023-09-29 RX ORDER — TAMSULOSIN HYDROCHLORIDE 0.4 MG/1
1 CAPSULE ORAL DAILY
Qty: 90 CAPSULE | Refills: 2 | Status: SHIPPED | OUTPATIENT
Start: 2023-09-29

## 2023-09-29 RX ORDER — CITALOPRAM 40 MG/1
40 TABLET ORAL DAILY
Qty: 90 TABLET | Refills: 2 | Status: SHIPPED | OUTPATIENT
Start: 2023-09-29

## 2023-10-25 ENCOUNTER — TELEPHONE (OUTPATIENT)
Dept: CASE MANAGEMENT | Facility: OTHER | Age: 53
End: 2023-10-25
Payer: COMMERCIAL

## 2023-10-25 DIAGNOSIS — Z79.4 TYPE 2 DIABETES MELLITUS WITH BOTH EYES AFFECTED BY PROLIFERATIVE RETINOPATHY AND MACULAR EDEMA, WITH LONG-TERM CURRENT USE OF INSULIN: Primary | ICD-10-CM

## 2023-10-25 DIAGNOSIS — I10 ESSENTIAL HYPERTENSION: ICD-10-CM

## 2023-10-25 DIAGNOSIS — N18.4 STAGE 4 CHRONIC KIDNEY DISEASE: ICD-10-CM

## 2023-10-25 DIAGNOSIS — E11.3513 TYPE 2 DIABETES MELLITUS WITH BOTH EYES AFFECTED BY PROLIFERATIVE RETINOPATHY AND MACULAR EDEMA, WITH LONG-TERM CURRENT USE OF INSULIN: Primary | ICD-10-CM

## 2023-10-25 DIAGNOSIS — I50.32 CHRONIC DIASTOLIC (CONGESTIVE) HEART FAILURE: ICD-10-CM

## 2023-10-26 ENCOUNTER — PATIENT OUTREACH (OUTPATIENT)
Dept: CASE MANAGEMENT | Facility: OTHER | Age: 53
End: 2023-10-26
Payer: COMMERCIAL

## 2023-10-26 DIAGNOSIS — I10 ESSENTIAL HYPERTENSION: ICD-10-CM

## 2023-10-26 DIAGNOSIS — I50.32 CHRONIC DIASTOLIC (CONGESTIVE) HEART FAILURE: ICD-10-CM

## 2023-10-26 DIAGNOSIS — Z79.4 TYPE 2 DIABETES MELLITUS WITH BOTH EYES AFFECTED BY PROLIFERATIVE RETINOPATHY AND MACULAR EDEMA, WITH LONG-TERM CURRENT USE OF INSULIN: Primary | ICD-10-CM

## 2023-10-26 DIAGNOSIS — E11.3513 TYPE 2 DIABETES MELLITUS WITH BOTH EYES AFFECTED BY PROLIFERATIVE RETINOPATHY AND MACULAR EDEMA, WITH LONG-TERM CURRENT USE OF INSULIN: Primary | ICD-10-CM

## 2023-10-26 NOTE — OUTREACH NOTE
AMBULATORY CASE MANAGEMENT NOTE    Name and Relationship of Patient/Support Person: Luiz Rogelio S - Self    Care Coordination      CM reached out to the patient and he stated that he was feeling well at the time of the call CM discussed in detail the patients meds. No refills were required at time of call . Patients over all condition was discussed and he stated that he felt as he had reached his potential at this time. CM reviewed the patients chart and stated that the patient would be placed into a monitoring status and may be graduated from program at a later date and the patient agreed. .Chart reviewed , meds reviewed, patient stated no further needs at this time.All future appts reviewed . All questions were answered and contact information provided . Future outreach scheduled .     CM placed patient into monitoring status and scheduled review follow up               Education Documentation  No documentation found.        JULES HUIZAR  Ambulatory Case Management    10/26/2023, 12:22 EDT

## 2023-11-06 ENCOUNTER — OFFICE VISIT (OUTPATIENT)
Dept: INTERNAL MEDICINE | Facility: CLINIC | Age: 53
End: 2023-11-06
Payer: COMMERCIAL

## 2023-11-06 VITALS
OXYGEN SATURATION: 96 % | HEIGHT: 68 IN | HEART RATE: 82 BPM | BODY MASS INDEX: 26.19 KG/M2 | TEMPERATURE: 97.7 F | SYSTOLIC BLOOD PRESSURE: 136 MMHG | DIASTOLIC BLOOD PRESSURE: 74 MMHG | WEIGHT: 172.8 LBS

## 2023-11-06 DIAGNOSIS — I50.32 CHRONIC DIASTOLIC (CONGESTIVE) HEART FAILURE: ICD-10-CM

## 2023-11-06 DIAGNOSIS — Z59.41 FOOD INSECURITY: ICD-10-CM

## 2023-11-06 DIAGNOSIS — R19.7 DIARRHEA, UNSPECIFIED TYPE: ICD-10-CM

## 2023-11-06 DIAGNOSIS — E87.20 METABOLIC ACIDOSIS: ICD-10-CM

## 2023-11-06 DIAGNOSIS — H54.40 BLINDNESS OF LEFT EYE, UNSPECIFIED RIGHT EYE VISUAL IMPAIRMENT CATEGORY: ICD-10-CM

## 2023-11-06 DIAGNOSIS — N18.4 STAGE 4 CHRONIC KIDNEY DISEASE: ICD-10-CM

## 2023-11-06 DIAGNOSIS — E11.3513 TYPE 2 DIABETES MELLITUS WITH BOTH EYES AFFECTED BY PROLIFERATIVE RETINOPATHY AND MACULAR EDEMA, WITH LONG-TERM CURRENT USE OF INSULIN: ICD-10-CM

## 2023-11-06 DIAGNOSIS — E78.2 HYPERLIPEMIA, MIXED: ICD-10-CM

## 2023-11-06 DIAGNOSIS — Z79.4 TYPE 2 DIABETES MELLITUS WITH BOTH EYES AFFECTED BY PROLIFERATIVE RETINOPATHY AND MACULAR EDEMA, WITH LONG-TERM CURRENT USE OF INSULIN: ICD-10-CM

## 2023-11-06 DIAGNOSIS — I10 ESSENTIAL HYPERTENSION: Primary | ICD-10-CM

## 2023-11-06 DIAGNOSIS — F32.89 OTHER DEPRESSION: ICD-10-CM

## 2023-11-06 LAB
BASOPHILS # BLD AUTO: 0.08 10*3/MM3 (ref 0–0.2)
BASOPHILS NFR BLD AUTO: 0.9 % (ref 0–1.5)
DEPRECATED RDW RBC AUTO: 39.8 FL (ref 37–54)
EOSINOPHIL # BLD AUTO: 0.28 10*3/MM3 (ref 0–0.4)
EOSINOPHIL NFR BLD AUTO: 3.1 % (ref 0.3–6.2)
ERYTHROCYTE [DISTWIDTH] IN BLOOD BY AUTOMATED COUNT: 12 % (ref 12.3–15.4)
HBA1C MFR BLD: 7.7 % (ref 4.8–5.6)
HCT VFR BLD AUTO: 28.5 % (ref 37.5–51)
HGB BLD-MCNC: 9.4 G/DL (ref 13–17.7)
IMM GRANULOCYTES # BLD AUTO: 0.04 10*3/MM3 (ref 0–0.05)
IMM GRANULOCYTES NFR BLD AUTO: 0.4 % (ref 0–0.5)
LYMPHOCYTES # BLD AUTO: 1.38 10*3/MM3 (ref 0.7–3.1)
LYMPHOCYTES NFR BLD AUTO: 15.5 % (ref 19.6–45.3)
MCH RBC QN AUTO: 30.1 PG (ref 26.6–33)
MCHC RBC AUTO-ENTMCNC: 33 G/DL (ref 31.5–35.7)
MCV RBC AUTO: 91.3 FL (ref 79–97)
MONOCYTES # BLD AUTO: 0.65 10*3/MM3 (ref 0.1–0.9)
MONOCYTES NFR BLD AUTO: 7.3 % (ref 5–12)
NEUTROPHILS NFR BLD AUTO: 6.46 10*3/MM3 (ref 1.7–7)
NEUTROPHILS NFR BLD AUTO: 72.8 % (ref 42.7–76)
NRBC BLD AUTO-RTO: 0 /100 WBC (ref 0–0.2)
PLATELET # BLD AUTO: 314 10*3/MM3 (ref 140–450)
PMV BLD AUTO: 11.2 FL (ref 6–12)
RBC # BLD AUTO: 3.12 10*6/MM3 (ref 4.14–5.8)
WBC NRBC COR # BLD: 8.89 10*3/MM3 (ref 3.4–10.8)

## 2023-11-06 PROCEDURE — 80053 COMPREHEN METABOLIC PANEL: CPT | Performed by: STUDENT IN AN ORGANIZED HEALTH CARE EDUCATION/TRAINING PROGRAM

## 2023-11-06 PROCEDURE — 85025 COMPLETE CBC W/AUTO DIFF WBC: CPT | Performed by: STUDENT IN AN ORGANIZED HEALTH CARE EDUCATION/TRAINING PROGRAM

## 2023-11-06 PROCEDURE — 83036 HEMOGLOBIN GLYCOSYLATED A1C: CPT | Performed by: STUDENT IN AN ORGANIZED HEALTH CARE EDUCATION/TRAINING PROGRAM

## 2023-11-06 PROCEDURE — 80061 LIPID PANEL: CPT | Performed by: STUDENT IN AN ORGANIZED HEALTH CARE EDUCATION/TRAINING PROGRAM

## 2023-11-06 SDOH — ECONOMIC STABILITY - FOOD INSECURITY: FOOD INSECURITY: Z59.41

## 2023-11-06 NOTE — PROGRESS NOTES
Chief Complaint  Chronic Kidney Disease (Follow up) and Abdominal Cramping (Lower abdominal pain for 3 days )    Subjective          Rogelio Dee presents to [facility]  History of Present Illness    Checks blood sugar once daily (at night).  Running 100-200 per his report.  Using 15U of tresiba daily, and not using short acting insulin.    Has had no falls, no syncopal episodes, and denies chest pain.    Has had diarrhea the last three days.  Would like to try immodium.    He is doing well in terms of his depression.  He denies SI.    He is not interested in quitting smoking.    PHQ-9 Depression Screening  Little interest or pleasure in doing things?     Feeling down, depressed, or hopeless?     Trouble falling or staying asleep, or sleeping too much?     Feeling tired or having little energy?     Poor appetite or overeating?     Feeling bad about yourself - or that you are a failure or have let yourself or your family down?     Trouble concentrating on things, such as reading the newspaper or watching television?     Moving or speaking so slowly that other people could have noticed? Or the opposite - being so fidgety or restless that you have been moving around a lot more than usual?     Thoughts that you would be better off dead, or of hurting yourself in some way?     PHQ-9 Total Score     If you checked off any problems, how difficult have these problems made it for you to do your work, take care of things at home, or get along with other people?           Current Outpatient Medications   Medication Instructions    Admelog 100 UNIT/ML injection ADMINISTER 15 UNITS UNDER THE SKIN BEFORE A MEAL AS NEEDED PER SLIDING SCALE    amLODIPine (NORVASC) 10 MG tablet amlodipine 10 mg tablet    aspirin 81 mg, Oral, Daily    atorvastatin (LIPITOR) 20 mg, Oral, Daily    carvedilol (COREG) 25 MG tablet TAKE 1 TABLET BY MOUTH TWICE DAILY WITH MEALS    cetirizine (ZYRTEC) 5  "mg, Oral, Daily    Cholecalciferol 2,000 Units, Oral, Daily    citalopram (CELEXA) 40 mg, Oral, Daily    cloNIDine (CATAPRES) 0.2 mg, Oral, 2 Times Daily    cyclobenzaprine (FLEXERIL) 10 MG tablet Every 8 Hours Scheduled    dorzolamide-timolol (COSOPT) 22.3-6.8 MG/ML ophthalmic solution 1 drop, Both Eyes, 2 Times Daily    ferrous sulfate 325 (65 FE) MG tablet FeroSul 325 mg (65 mg iron) tablet   TAKE 1 TABLET BY MOUTH TWICE DAILY    furosemide (LASIX) 40 mg, Oral, Daily    hydrALAZINE (APRESOLINE) 100 MG tablet TAKE 1 TABLET BY MOUTH THREE TIMES DAILY    HYDROcodone-acetaminophen (NORCO) 7.5-325 MG per tablet Every 6 Hours    insulin degludec (TRESIBA FLEXTOUCH) 20 Units, Subcutaneous, Daily    Insulin Lispro (1 Unit Dial) (HUMALOG) 8 Units, Subcutaneous, Daily With Breakfast, Lunch & Dinner    NIFEdipine XL (PROCARDIA XL) 30 MG 24 hr tablet TAKE 2 TABLETS BY MOUTH EVERY DAY    omeprazole (PRILOSEC) 40 mg, Oral, Daily    rOPINIRole (REQUIP) 0.25 MG tablet TAKE 1 TABLET BY MOUTH EVERY NIGHT 1 HOUR BEFORE BEDTIME    sodium bicarbonate 650 mg, Oral, 3 Times Daily    sodium polystyrene (KAYEXALATE) 15 GM/60ML suspension     tamsulosin (FLOMAX) 0.4 mg, Oral, Daily       The following portions of the patient's history were reviewed and updated as appropriate: allergies, current medications, past family history, past medical history, past social history, past surgical history, and problem list.    Objective   Vital Signs:   /74 (BP Location: Right arm, Patient Position: Sitting, Cuff Size: Large Adult)   Pulse 82   Temp 97.7 °F (36.5 °C) (Temporal)   Ht 172.7 cm (68\")   Wt 78.4 kg (172 lb 12.8 oz)   SpO2 96%   BMI 26.27 kg/m²     BP Readings from Last 3 Encounters:   11/06/23 136/74   08/04/23 131/86   07/06/23 150/72     Wt Readings from Last 3 Encounters:   11/06/23 78.4 kg (172 lb 12.8 oz)   08/04/23 76.2 kg (168 lb)   07/06/23 77.6 kg (171 lb)           Physical Exam  Vitals reviewed.   Constitutional:      "  General: He is not in acute distress.     Appearance: Normal appearance. He is not ill-appearing, toxic-appearing or diaphoretic.   HENT:      Head: Normocephalic and atraumatic.      Right Ear: External ear normal.      Left Ear: External ear normal.   Eyes:      Conjunctiva/sclera: Conjunctivae normal.   Cardiovascular:      Rate and Rhythm: Normal rate and regular rhythm.      Pulses: Normal pulses.      Heart sounds: Normal heart sounds. No murmur heard.     No friction rub. No gallop.   Pulmonary:      Effort: Pulmonary effort is normal. No respiratory distress.      Breath sounds: Normal breath sounds. No stridor. No wheezing, rhonchi or rales.   Chest:      Chest wall: No tenderness.   Abdominal:      General: Abdomen is flat.      Palpations: Abdomen is soft. There is no mass.      Tenderness: There is abdominal tenderness. There is no guarding.      Comments: Mild periumbilical TTP   Musculoskeletal:      Right lower leg: No edema.      Left lower leg: No edema.   Skin:     General: Skin is warm and dry.   Neurological:      Mental Status: He is alert. Mental status is at baseline.   Psychiatric:         Behavior: Behavior normal.         Thought Content: Thought content normal.         Judgment: Judgment normal.          Result Review :   The following data was reviewed by: Simeon Lomeli MD on 11/06/2023:  Common labs          3/13/2023    14:15 4/28/2023    16:01 8/4/2023    16:52   Common Labs   Glucose 206  215  191    BUN 49  62  43    Creatinine 3.80  3.60  3.76    Sodium 137  134  134    Potassium 4.3  3.8  4.0    Chloride 103  101  102    Calcium 9.0  10.0  9.4    Albumin 4.0  4.3  3.9    Total Bilirubin  0.3  0.3    Alkaline Phosphatase  113  105    AST (SGOT)  <5  8    ALT (SGPT)  8  6    WBC 9.33  9.55  9.57    Hemoglobin 10.5  11.2  9.9    Hematocrit 31.9  34.1  30.4    Platelets 379  367  355    Total Cholesterol  120     Triglycerides  164     HDL Cholesterol  26     LDL Cholesterol   66      Hemoglobin A1C  7.60  7.50             Lab Results   Component Value Date    INR 0.91 (L) 09/02/2020    BILIRUBINUR Negative 03/13/2023       Procedures        Assessment and Plan    Diagnoses and all orders for this visit:    1. Essential hypertension (Primary)  -     Comprehensive Metabolic Panel    2. Stage 4 chronic kidney disease  -     CBC & Differential  -     Comprehensive Metabolic Panel  -     Hemoglobin A1c    3. Type 2 diabetes mellitus with both eyes affected by proliferative retinopathy and macular edema, with long-term current use of insulin  -     Comprehensive Metabolic Panel  -     Hemoglobin A1c  -     Ambulatory Referral to Endocrinology    4. Metabolic acidosis  -     Comprehensive Metabolic Panel    5. Chronic diastolic (congestive) heart failure    6. Other depression    7. Food insecurity    8. Blindness of left eye, unspecified right eye visual impairment category    9. Hyperlipemia, mixed  -     Lipid Panel    10. Diarrhea, unspecified type      HTN:  -BP at goal  -will continue current regimen  -labs today    CKD 4:  -labs today  -I encouraged him to schedule follow up with nephro  -I once again reviewed the process of dialysis, and the process of AV fistula planning and placement  -I counseled him today that given his current level of renal function it is highly likely that he will need dialysis at some point in the future (quite possibly in the near term)  -he did express more interest than he had in the past, and although not committing to it he did seem at least to be seriously considering dialysis    Diarrhea:  -I would not be comfortable using bentyl given his renal function  -I counseled him that it should be safe to use immodium      Medications Discontinued During This Encounter   Medication Reason    lisinopril (PRINIVIL,ZESTRIL) 20 MG tablet           Follow Up   Return in about 3 months (around 2/6/2024) for CKD.  Patient was given instructions and counseling regarding his  condition or for health maintenance advice. Please see specific information pulled into the AVS if appropriate.       Simeon Lomeli MD  11/06/23  18:08 EST

## 2023-11-06 NOTE — PROGRESS NOTES
Chief Complaint  Chronic Kidney Disease (Follow up)    Subjective     {Problem List  Visit Diagnosis   Encounters  Notes  Medications  Labs  Result Review Imaging  Media :23}     Rogelio Dee presents to [facility]  History of Present Illness      Current Outpatient Medications   Medication Instructions    Admelog 100 UNIT/ML injection ADMINISTER 15 UNITS UNDER THE SKIN BEFORE A MEAL AS NEEDED PER SLIDING SCALE    amLODIPine (NORVASC) 10 MG tablet amlodipine 10 mg tablet    aspirin 81 mg, Oral, Daily    atorvastatin (LIPITOR) 20 mg, Oral, Daily    carvedilol (COREG) 25 MG tablet TAKE 1 TABLET BY MOUTH TWICE DAILY WITH MEALS    cetirizine (ZYRTEC) 5 mg, Oral, Daily    Cholecalciferol 2,000 Units, Oral, Daily    citalopram (CELEXA) 40 mg, Oral, Daily    cloNIDine (CATAPRES) 0.2 mg, Oral, 2 Times Daily    cyclobenzaprine (FLEXERIL) 10 MG tablet Every 8 Hours Scheduled    dorzolamide-timolol (COSOPT) 22.3-6.8 MG/ML ophthalmic solution 1 drop, Both Eyes, 2 Times Daily    ferrous sulfate 325 (65 FE) MG tablet FeroSul 325 mg (65 mg iron) tablet   TAKE 1 TABLET BY MOUTH TWICE DAILY    furosemide (LASIX) 40 mg, Oral, Daily    hydrALAZINE (APRESOLINE) 100 MG tablet TAKE 1 TABLET BY MOUTH THREE TIMES DAILY    HYDROcodone-acetaminophen (NORCO) 7.5-325 MG per tablet Every 6 Hours    insulin degludec (TRESIBA FLEXTOUCH) 20 Units, Subcutaneous, Daily    Insulin Lispro (1 Unit Dial) (HUMALOG) 8 Units, Subcutaneous, Daily With Breakfast, Lunch & Dinner    lisinopril (PRINIVIL,ZESTRIL) 20 mg, Oral, 2 Times Daily    NIFEdipine XL (PROCARDIA XL) 30 MG 24 hr tablet TAKE 2 TABLETS BY MOUTH EVERY DAY    omeprazole (PRILOSEC) 40 mg, Oral, Daily    rOPINIRole (REQUIP) 0.25 MG tablet TAKE 1 TABLET BY MOUTH EVERY NIGHT 1 HOUR BEFORE BEDTIME    sodium bicarbonate 650 mg, Oral, 3 Times Daily    sodium polystyrene (KAYEXALATE) 15 GM/60ML suspension     tamsulosin (FLOMAX) 0.4 mg,  Oral, Daily       The following portions of the patient's history were reviewed and updated as appropriate: allergies, current medications, past family history, past medical history, past social history, past surgical history, and problem list.    Objective   Vital Signs:   There were no vitals taken for this visit.    BP Readings from Last 3 Encounters:   08/04/23 131/86   07/06/23 150/72   04/28/23 160/88     Wt Readings from Last 3 Encounters:   08/04/23 76.2 kg (168 lb)   07/06/23 77.6 kg (171 lb)   04/28/23 77 kg (169 lb 12.8 oz)           Physical Exam     Appearance: No acute distress, well-nourished  Head: normocephalic, atraumatic  Eyes: extraocular movements intact, no scleral icterus, no conjunctival injection  Ears, Nose, and Throat: external ears normal, nares patent, moist mucous membranes  Cardiovascular: regular rate and rhythm. no murmurs, rubs, or gallops. no edema  Respiratory: breathing comfortably, symmetric chest rise, clear to auscultation bilaterally. No wheezes, rales, or rhonchi.  Neuro: alert and oriented to time, place, and person. Normal gait  Psych: normal mood and affect     Result Review :{Labs  Result Review  Imaging  Med Tab  Media  Procedures :23}   The following data was reviewed by: Prabha Liang MA on 11/06/2023:  Common labs          3/13/2023    14:15 4/28/2023    16:01 8/4/2023    16:52   Common Labs   Glucose 206  215  191    BUN 49  62  43    Creatinine 3.80  3.60  3.76    Sodium 137  134  134    Potassium 4.3  3.8  4.0    Chloride 103  101  102    Calcium 9.0  10.0  9.4    Albumin 4.0  4.3  3.9    Total Bilirubin  0.3  0.3    Alkaline Phosphatase  113  105    AST (SGOT)  <5  8    ALT (SGPT)  8  6    WBC 9.33  9.55  9.57    Hemoglobin 10.5  11.2  9.9    Hematocrit 31.9  34.1  30.4    Platelets 379  367  355    Total Cholesterol  120     Triglycerides  164     HDL Cholesterol  26     LDL Cholesterol   66     Hemoglobin A1C  7.60  7.50        {Data reviewed  (Optional):13655:::1}     Lab Results   Component Value Date    INR 0.91 (L) 09/02/2020    BILIRUBINUR Negative 03/13/2023       Procedures        Assessment and Plan {CC Problem List  Visit Diagnosis   ROS  Review (Popup)  Health Maintenance  Quality  BestPractice  Medications  SmartSets  SnapShot Encounters  Media :23}   Diagnoses and all orders for this visit:    1. Essential hypertension (Primary)    2. Stage 4 chronic kidney disease    3. Type 2 diabetes mellitus with both eyes affected by proliferative retinopathy and macular edema, with long-term current use of insulin    4. Metabolic acidosis    5. Chronic diastolic (congestive) heart failure    6. Other depression    7. Food insecurity    8. Blindness of left eye, unspecified right eye visual impairment category    9. Hyperlipemia, mixed          There are no discontinued medications.     {Time Spent (Optional):88471}  Follow Up {Instructions Charge Capture  Follow-up Communications :23}  No follow-ups on file.  Patient was given instructions and counseling regarding his condition or for health maintenance advice. Please see specific information pulled into the AVS if appropriate.       Prabha Liang MA  11/06/23  15:24 EST

## 2023-11-07 DIAGNOSIS — I10 ESSENTIAL HYPERTENSION: ICD-10-CM

## 2023-11-07 LAB
ALBUMIN SERPL-MCNC: 3.5 G/DL (ref 3.5–5.2)
ALBUMIN/GLOB SERPL: 1.1 G/DL
ALP SERPL-CCNC: 109 U/L (ref 39–117)
ALT SERPL W P-5'-P-CCNC: 9 U/L (ref 1–41)
ANION GAP SERPL CALCULATED.3IONS-SCNC: 15.5 MMOL/L (ref 5–15)
AST SERPL-CCNC: 8 U/L (ref 1–40)
BILIRUB SERPL-MCNC: <0.2 MG/DL (ref 0–1.2)
BUN SERPL-MCNC: 39 MG/DL (ref 6–20)
BUN/CREAT SERPL: 11 (ref 7–25)
CALCIUM SPEC-SCNC: 8.8 MG/DL (ref 8.6–10.5)
CHLORIDE SERPL-SCNC: 103 MMOL/L (ref 98–107)
CHOLEST SERPL-MCNC: 85 MG/DL (ref 0–200)
CO2 SERPL-SCNC: 15.5 MMOL/L (ref 22–29)
CREAT SERPL-MCNC: 3.54 MG/DL (ref 0.76–1.27)
EGFRCR SERPLBLD CKD-EPI 2021: 19.9 ML/MIN/1.73
GLOBULIN UR ELPH-MCNC: 3.1 GM/DL
GLUCOSE SERPL-MCNC: 299 MG/DL (ref 65–99)
HDLC SERPL-MCNC: 22 MG/DL (ref 40–60)
LDLC SERPL CALC-MCNC: 35 MG/DL (ref 0–100)
LDLC/HDLC SERPL: 1.35 {RATIO}
POTASSIUM SERPL-SCNC: 3.9 MMOL/L (ref 3.5–5.2)
PROT SERPL-MCNC: 6.6 G/DL (ref 6–8.5)
SODIUM SERPL-SCNC: 134 MMOL/L (ref 136–145)
TRIGL SERPL-MCNC: 166 MG/DL (ref 0–150)
VLDLC SERPL-MCNC: 28 MG/DL (ref 5–40)

## 2023-11-07 RX ORDER — FUROSEMIDE 40 MG/1
40 TABLET ORAL DAILY
Qty: 90 TABLET | Refills: 2 | Status: SHIPPED | OUTPATIENT
Start: 2023-11-07

## 2023-11-07 RX ORDER — ROPINIROLE 0.25 MG/1
TABLET, FILM COATED ORAL
Qty: 90 TABLET | Refills: 2 | Status: SHIPPED | OUTPATIENT
Start: 2023-11-07

## 2023-11-07 RX ORDER — ASPIRIN 81 MG/1
81 TABLET ORAL DAILY
Qty: 90 TABLET | Refills: 1 | Status: SHIPPED | OUTPATIENT
Start: 2023-11-07

## 2023-11-07 RX ORDER — CARVEDILOL 25 MG/1
TABLET ORAL
Qty: 180 TABLET | Refills: 2 | Status: SHIPPED | OUTPATIENT
Start: 2023-11-07

## 2023-11-13 RX ORDER — DORZOLAMIDE HYDROCHLORIDE AND TIMOLOL MALEATE 20; 5 MG/ML; MG/ML
1 SOLUTION/ DROPS OPHTHALMIC 2 TIMES DAILY
Qty: 10 ML | Refills: 3 | OUTPATIENT
Start: 2023-11-13

## 2023-11-13 NOTE — TELEPHONE ENCOUNTER
Please advise refill request, warning populated for contraindication based on patient information.

## 2023-11-20 ENCOUNTER — PATIENT OUTREACH (OUTPATIENT)
Dept: CASE MANAGEMENT | Facility: OTHER | Age: 53
End: 2023-11-20
Payer: COMMERCIAL

## 2023-11-20 DIAGNOSIS — I10 ESSENTIAL HYPERTENSION: ICD-10-CM

## 2023-11-20 DIAGNOSIS — E11.3513 TYPE 2 DIABETES MELLITUS WITH BOTH EYES AFFECTED BY PROLIFERATIVE RETINOPATHY AND MACULAR EDEMA, WITH LONG-TERM CURRENT USE OF INSULIN: Primary | ICD-10-CM

## 2023-11-20 DIAGNOSIS — Z79.4 TYPE 2 DIABETES MELLITUS WITH BOTH EYES AFFECTED BY PROLIFERATIVE RETINOPATHY AND MACULAR EDEMA, WITH LONG-TERM CURRENT USE OF INSULIN: Primary | ICD-10-CM

## 2023-11-20 NOTE — OUTREACH NOTE
AMBULATORY CASE MANAGEMENT NOTE    Name and Relationship of Patient/Support Person: Rogelio Dee S - Self    CCM Interim Update    Care Coordination      CM reached out to the patient . The patient stated that he had been doing well and that he had no needs at this time . .Chart reviewed , meds reviewed, patient stated no further needs at this time.All future appts reviewed . All questions were answered and contact information provided . Future outreach scheduled . Patient will be slated for possible Graduation from [employer] [facility].           Education Documentation  No documentation found.        JULES HUIZAR  Ambulatory Case Management    11/20/2023, 09:26 EST

## 2023-12-04 ENCOUNTER — PATIENT OUTREACH (OUTPATIENT)
Dept: CASE MANAGEMENT | Facility: OTHER | Age: 53
End: 2023-12-04
Payer: COMMERCIAL

## 2023-12-04 DIAGNOSIS — I50.32 CHRONIC DIASTOLIC (CONGESTIVE) HEART FAILURE: ICD-10-CM

## 2023-12-04 DIAGNOSIS — I10 ESSENTIAL HYPERTENSION: ICD-10-CM

## 2023-12-04 DIAGNOSIS — N18.4 STAGE 4 CHRONIC KIDNEY DISEASE: ICD-10-CM

## 2023-12-04 DIAGNOSIS — Z79.4 TYPE 2 DIABETES MELLITUS WITH BOTH EYES AFFECTED BY PROLIFERATIVE RETINOPATHY AND MACULAR EDEMA, WITH LONG-TERM CURRENT USE OF INSULIN: Primary | ICD-10-CM

## 2023-12-04 DIAGNOSIS — E11.3513 TYPE 2 DIABETES MELLITUS WITH BOTH EYES AFFECTED BY PROLIFERATIVE RETINOPATHY AND MACULAR EDEMA, WITH LONG-TERM CURRENT USE OF INSULIN: Primary | ICD-10-CM
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
## 2023-12-05 ENCOUNTER — PATIENT OUTREACH (OUTPATIENT)
Dept: CASE MANAGEMENT | Facility: OTHER | Age: 53
End: 2023-12-05
Payer: COMMERCIAL

## 2023-12-05 DIAGNOSIS — I10 ESSENTIAL HYPERTENSION: ICD-10-CM

## 2023-12-05 DIAGNOSIS — Z79.4 TYPE 2 DIABETES MELLITUS WITH BOTH EYES AFFECTED BY PROLIFERATIVE RETINOPATHY AND MACULAR EDEMA, WITH LONG-TERM CURRENT USE OF INSULIN: Primary | ICD-10-CM

## 2023-12-05 DIAGNOSIS — E11.3513 TYPE 2 DIABETES MELLITUS WITH BOTH EYES AFFECTED BY PROLIFERATIVE RETINOPATHY AND MACULAR EDEMA, WITH LONG-TERM CURRENT USE OF INSULIN: Primary | ICD-10-CM

## 2023-12-05 NOTE — OUTREACH NOTE
AMBULATORY CASE MANAGEMENT NOTE    Name and Relationship of Patient/Support Person: Rogelio Dee - Self    Care Coordination    Patient called about his significant other and asked  CM for an update on the patient . CM explained that since he was not listed on the patiens release of information CM could not provide any information about the patient. The patient verbalized understanding . No further needs at this time.     Education Documentation  No documentation found.        JULES HUIZAR  Ambulatory Case Management    12/5/2023, 16:12 EST

## 2023-12-06 RX ORDER — NIFEDIPINE 30 MG/1
TABLET, EXTENDED RELEASE ORAL
Qty: 180 TABLET | Refills: 1 | Status: SHIPPED | OUTPATIENT
Start: 2023-12-06

## 2023-12-18 DIAGNOSIS — E78.2 HYPERLIPEMIA, MIXED: ICD-10-CM

## 2023-12-19 RX ORDER — ATORVASTATIN CALCIUM 20 MG/1
20 TABLET, FILM COATED ORAL DAILY
Qty: 90 TABLET | Refills: 2 | Status: SHIPPED | OUTPATIENT
Start: 2023-12-19

## 2023-12-21 DIAGNOSIS — K21.9 GASTROESOPHAGEAL REFLUX DISEASE WITHOUT ESOPHAGITIS: ICD-10-CM

## 2023-12-22 RX ORDER — OMEPRAZOLE 40 MG/1
40 CAPSULE, DELAYED RELEASE ORAL DAILY
Qty: 90 CAPSULE | Refills: 2 | Status: SHIPPED | OUTPATIENT
Start: 2023-12-22

## 2023-12-29 ENCOUNTER — PATIENT OUTREACH (OUTPATIENT)
Dept: CASE MANAGEMENT | Facility: OTHER | Age: 53
End: 2023-12-29
Payer: COMMERCIAL

## 2023-12-29 DIAGNOSIS — N18.4 STAGE 4 CHRONIC KIDNEY DISEASE: ICD-10-CM

## 2023-12-29 DIAGNOSIS — I10 ESSENTIAL HYPERTENSION: ICD-10-CM

## 2023-12-29 DIAGNOSIS — Z79.4 TYPE 2 DIABETES MELLITUS WITH BOTH EYES AFFECTED BY PROLIFERATIVE RETINOPATHY AND MACULAR EDEMA, WITH LONG-TERM CURRENT USE OF INSULIN: Primary | ICD-10-CM

## 2023-12-29 DIAGNOSIS — I50.32 CHRONIC DIASTOLIC (CONGESTIVE) HEART FAILURE: ICD-10-CM

## 2023-12-29 DIAGNOSIS — E11.3513 TYPE 2 DIABETES MELLITUS WITH BOTH EYES AFFECTED BY PROLIFERATIVE RETINOPATHY AND MACULAR EDEMA, WITH LONG-TERM CURRENT USE OF INSULIN: Primary | ICD-10-CM

## 2023-12-29 NOTE — OUTREACH NOTE
AMBULATORY CASE MANAGEMENT NOTE    Name and Relationship of Patient/Support Person:  -     [facility] [place] End of Month Documentation    This Chronic Medical Management Care Plan for Rogelio Dee, 53 y.o. male, has been monitored and managed; reviewed and a new plan of care implemented for the month of December.  A cumulative time of 145  minutes was spent on this patient record this month, including chart review; phone call with patient; phone call with primary care provider, 911 call.    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, bilateral; Stage 4 CKD; Blindness of left eye; Diabetic neuropathy; Suicidal ideation; Depression; Housing situation unstable; Smoker; Chronic diastolic CHF; Essential hypertension; Hyperlipemia, mixed; Chronic pain disorder; Combined forms of age-related cataract, bilateral; Displacement of lumbar intervertebral disc without myelopathy; Ocular hypertension, bilateral; Cigarette nicotine dependence without complication; History of suicidal ideation; Overweight (BMI 25.0-29.9); Food insecurity; and Lumbar spondylosis on their problem list., the following items were addressed: medical records; medications and any changes can be found within the plan section of the note.  A detailed listing of time spent for chronic care management is tracked within each outreach encounter.  Current medications include:  has a current medication list which includes the following prescription(s): admelog, amlodipine, aspirin, atorvastatin, carvedilol, cetirizine, cholecalciferol, citalopram, clonidine, cyclobenzaprine, dorzolamide-timolol, ferrous sulfate, furosemide, hydralazine, hydrocodone-acetaminophen, insulin degludec, insulin lispro (1 unit dial), nifedipine xl, omeprazole, ropinirole, sodium bicarbonate, sodium polystyrene, and tamsulosin. and the patient is reported to be patient is noncompliant with medication protocol, unsure,  Medications are  reported to be effective, K[place]ney function reduced, A1c high.    All notes on chart for PCP to review.    The patient was monitored remotely for blood glucose; blood pressure; mood & behavior.    The patient's physical needs include:  help taking medications as prescribed; medication education; needs assistance with ADLs.     The patient's mental support needs include:  needs met; continued support    The patient's cognitive support needs include:  not applicable    The patient's psychosocial support needs include:  continued support    The patient's functional needs include: needs met    The patient's environmental needs include:  not applicable, N/A    Care Plan overall comments:  Care plan updated,    Refer to previous outreach notes for more information on the areas listed above.    Monthly Billing Diagnoses  (E11.3513,  Z79.4) Type 2 diabetes mellitus with both eyes affected by proliferative retinopathy and macular edema, with long-term current use of insulin    (I10) Essential hypertension    (I50.32) Chronic diastolic (congestive) heart failure    (N18.4) Stage 4 CKD    Medications   Medications have been reconciled    Care Plan progress this month:      Recently Modified Care Plans Updates made since 11/28/2023 12:00 AM      No recently modified care plans.            Current Specialty Plan of Care Status not yet initiated    Instructions   Patient was provided an electronic copy of care plan  CCM services were explained and offered and patient has accepted these services.  Patient has given their written consent to receive CCM services and understands that this includes the authorization of electronic communication of medical information with the other treating providers.  Patient understands that they may stop CCM services at any time and these changes will be effective at the end of the calendar month and will effectively revocate the agreement of CCM services.  Patient understands that only one  practitioner can furnish and be paid for CCM services during one calendar month.  Patient also understands that there may be co-payment or deductible fees in association with CCM services.  Patient will continue with at least monthly follow-up calls with the Ambulatory .    JULES HUIZAR  Ambulatory Case Management    12/29/2023, 16:21 EST    Education Documentation  No documentation found.        JULES HUIZAR  Ambulatory Case Management    12/29/2023, 16:21 EST

## 2024-01-01 ENCOUNTER — APPOINTMENT (OUTPATIENT)
Dept: GENERAL RADIOLOGY | Facility: HOSPITAL | Age: 54
DRG: 871 | End: 2024-01-01
Payer: OTHER MISCELLANEOUS

## 2024-01-01 ENCOUNTER — APPOINTMENT (OUTPATIENT)
Facility: HOSPITAL | Age: 54
DRG: 871 | End: 2024-01-01
Payer: OTHER MISCELLANEOUS

## 2024-01-01 ENCOUNTER — HOSPITAL ENCOUNTER (INPATIENT)
Facility: HOSPITAL | Age: 54
LOS: 4 days | DRG: 871 | End: 2024-06-15
Attending: EMERGENCY MEDICINE | Admitting: INTERNAL MEDICINE
Payer: OTHER MISCELLANEOUS

## 2024-01-01 ENCOUNTER — APPOINTMENT (OUTPATIENT)
Dept: CT IMAGING | Facility: HOSPITAL | Age: 54
DRG: 871 | End: 2024-01-01
Payer: OTHER MISCELLANEOUS

## 2024-01-01 ENCOUNTER — TELEPHONE (OUTPATIENT)
Dept: VASCULAR SURGERY | Facility: HOSPITAL | Age: 54
End: 2024-01-01
Payer: COMMERCIAL

## 2024-01-01 ENCOUNTER — HOSPITAL ENCOUNTER (INPATIENT)
Facility: HOSPITAL | Age: 54
LOS: 1 days | DRG: 871 | End: 2024-06-16
Attending: INTERNAL MEDICINE | Admitting: INTERNAL MEDICINE
Payer: OTHER MISCELLANEOUS

## 2024-01-01 VITALS
HEIGHT: 68 IN | BODY MASS INDEX: 26.9 KG/M2 | DIASTOLIC BLOOD PRESSURE: 67 MMHG | SYSTOLIC BLOOD PRESSURE: 147 MMHG | WEIGHT: 177.47 LBS | RESPIRATION RATE: 20 BRPM | OXYGEN SATURATION: 66 % | TEMPERATURE: 100.4 F | HEART RATE: 102 BPM

## 2024-01-01 VITALS
HEART RATE: 113 BPM | TEMPERATURE: 103 F | RESPIRATION RATE: 32 BRPM | DIASTOLIC BLOOD PRESSURE: 68 MMHG | SYSTOLIC BLOOD PRESSURE: 173 MMHG | OXYGEN SATURATION: 73 %

## 2024-01-01 DIAGNOSIS — N18.9 ACUTE RENAL FAILURE SUPERIMPOSED ON CHRONIC KIDNEY DISEASE, UNSPECIFIED ACUTE RENAL FAILURE TYPE, UNSPECIFIED CKD STAGE: ICD-10-CM

## 2024-01-01 DIAGNOSIS — A41.9 SEPSIS WITH ACUTE RENAL FAILURE, DUE TO UNSPECIFIED ORGANISM, UNSPECIFIED ACUTE RENAL FAILURE TYPE, UNSPECIFIED WHETHER SEPTIC SHOCK PRESENT: Primary | ICD-10-CM

## 2024-01-01 DIAGNOSIS — N17.9 ACUTE RENAL FAILURE SUPERIMPOSED ON CHRONIC KIDNEY DISEASE, UNSPECIFIED ACUTE RENAL FAILURE TYPE, UNSPECIFIED CKD STAGE: ICD-10-CM

## 2024-01-01 DIAGNOSIS — L03.116 CELLULITIS OF LEFT FOOT: ICD-10-CM

## 2024-01-01 DIAGNOSIS — R65.20 SEPSIS WITH ACUTE RENAL FAILURE, DUE TO UNSPECIFIED ORGANISM, UNSPECIFIED ACUTE RENAL FAILURE TYPE, UNSPECIFIED WHETHER SEPTIC SHOCK PRESENT: Primary | ICD-10-CM

## 2024-01-01 DIAGNOSIS — N17.9 SEPSIS WITH ACUTE RENAL FAILURE, DUE TO UNSPECIFIED ORGANISM, UNSPECIFIED ACUTE RENAL FAILURE TYPE, UNSPECIFIED WHETHER SEPTIC SHOCK PRESENT: Primary | ICD-10-CM

## 2024-01-01 LAB
ACETONE BLD QL: NEGATIVE
ALBUMIN SERPL-MCNC: 2.7 G/DL (ref 3.5–5.2)
ALBUMIN SERPL-MCNC: 3.1 G/DL (ref 3.5–5.2)
ALBUMIN/GLOB SERPL: 0.8 G/DL
ALBUMIN/GLOB SERPL: 0.9 G/DL
ALP SERPL-CCNC: 139 U/L (ref 39–117)
ALP SERPL-CCNC: 186 U/L (ref 39–117)
ALT SERPL W P-5'-P-CCNC: 8 U/L (ref 1–41)
ALT SERPL W P-5'-P-CCNC: 8 U/L (ref 1–41)
ANION GAP SERPL CALCULATED.3IONS-SCNC: 12.9 MMOL/L (ref 5–15)
ANION GAP SERPL CALCULATED.3IONS-SCNC: 13.3 MMOL/L (ref 5–15)
ANION GAP SERPL CALCULATED.3IONS-SCNC: 13.4 MMOL/L (ref 5–15)
ANION GAP SERPL CALCULATED.3IONS-SCNC: 14.1 MMOL/L (ref 5–15)
ANION GAP SERPL CALCULATED.3IONS-SCNC: 19.9 MMOL/L (ref 5–15)
ANION GAP SERPL CALCULATED.3IONS-SCNC: 21.9 MMOL/L (ref 5–15)
ANION GAP SERPL CALCULATED.3IONS-SCNC: 21.9 MMOL/L (ref 5–15)
ANION GAP SERPL CALCULATED.3IONS-SCNC: 28.7 MMOL/L (ref 5–15)
ANISOCYTOSIS BLD QL: ABNORMAL
ANISOCYTOSIS BLD QL: NORMAL
ARTERIAL PATENCY WRIST A: POSITIVE
ARTERIAL PATENCY WRIST A: POSITIVE
AST SERPL-CCNC: 28 U/L (ref 1–40)
AST SERPL-CCNC: 28 U/L (ref 1–40)
ATMOSPHERIC PRESS: 740.5 MMHG
ATMOSPHERIC PRESS: 742.7 MMHG
ATMOSPHERIC PRESS: 744.7 MMHG
BACTERIA BLD CULT: ABNORMAL
BACTERIA SPEC AEROBE CULT: ABNORMAL
BACTERIA UR QL AUTO: ABNORMAL /HPF
BASE EXCESS BLDA CALC-SCNC: -15.6 MMOL/L (ref -2–2)
BASE EXCESS BLDA CALC-SCNC: -3.8 MMOL/L (ref -2–2)
BASE EXCESS BLDV CALC-SCNC: -15.8 MMOL/L (ref -2–2)
BASOPHILS # BLD AUTO: 0.1 10*3/MM3 (ref 0–0.2)
BASOPHILS # BLD AUTO: 0.15 10*3/MM3 (ref 0–0.2)
BASOPHILS NFR BLD AUTO: 0.4 % (ref 0–1.5)
BASOPHILS NFR BLD AUTO: 0.4 % (ref 0–1.5)
BDY SITE: ABNORMAL
BDY SITE: ABNORMAL
BH CV GRAFT BRACHIAL PRESSURE LEFT: 109 MMHG
BH CV GRAFT BRACHIAL PRESSURE RIGHT: 109 MMHG
BH CV LEA LEFT ANT TIBIAL A DISTAL EDV: 6.09 CM/S
BH CV LEA LEFT ANT TIBIAL A DISTAL PSV: 57.5 CM/S
BH CV LEA LEFT ANT TIBIAL A PROX EDV: 4.05 CM/S
BH CV LEA LEFT ANT TIBIAL A PROX PSV: 36.65 CM/S
BH CV LEA LEFT CFA DISTAL EDV: 25.13 CM/S
BH CV LEA LEFT CFA DISTAL PSV: 167.94 CM/S
BH CV LEA LEFT CFA PROX EDV: 23.99 CM/S
BH CV LEA LEFT CFA PROX PSV: 106.25 CM/S
BH CV LEA LEFT DFA PROX EDV: 17.14 CM/S
BH CV LEA LEFT DFA PROX PSV: 110.82 CM/S
BH CV LEA LEFT PERONEAL  DISTAL EDV: 0.48 CM/S
BH CV LEA LEFT PERONEAL  DISTAL PSV: 28.08 CM/S
BH CV LEA LEFT PERONEAL  PROX EDV: 4.17 CM/S
BH CV LEA LEFT PERONEAL  PROX PSV: 47.9 CM/S
BH CV LEA LEFT POPITEAL A  DISTAL EDV: 8.37 CM/S
BH CV LEA LEFT POPITEAL A  DISTAL PSV: 63.52 CM/S
BH CV LEA LEFT POPITEAL A  MID EDV: 6.35 CM/S
BH CV LEA LEFT POPITEAL A  MID PSV: 55.01 CM/S
BH CV LEA LEFT POPITEAL A  PROX EDV: 4.76 CM/S
BH CV LEA LEFT POPITEAL A  PROX PSV: 48.7 CM/S
BH CV LEA LEFT PTA DISTAL EDV: 5.21 CM/S
BH CV LEA LEFT PTA DISTAL PSV: 29.24 CM/S
BH CV LEA LEFT PTA PRESSURE: 106 MMHG
BH CV LEA LEFT PTA PROX EDV: 5.37 CM/S
BH CV LEA LEFT PTA PROX PSV: 29.08 CM/S
BH CV LEA LEFT SFA DISTAL EDV: 5.24 CM/S
BH CV LEA LEFT SFA DISTAL PSV: 55.69 CM/S
BH CV LEA LEFT SFA MID EDV: 8.37 CM/S
BH CV LEA LEFT SFA MID PSV: 58.11 CM/S
BH CV LEA LEFT SFA PROX EDV: 14.85 CM/S
BH CV LEA LEFT SFA PROX PSV: 115.39 CM/S
BH CV LEA LEFT TIBEOPERONEAL EDV: 5.24 CM/S
BH CV LEA LEFT TIBEOPERONEAL PSV: 81.4 CM/S
BH CV LEA RIGHT PTA PRESSURE: 100 MMHG
BH CV LOWER ARTERIAL LEFT ABI RATIO: 0.97
BH CV LOWER ARTERIAL RIGHT ABI RATIO: 0.92
BH CV LOWER VASCULAR LEFT COMMON FEMORAL AUGMENT: NORMAL
BH CV LOWER VASCULAR LEFT COMMON FEMORAL COMPETENT: NORMAL
BH CV LOWER VASCULAR LEFT COMMON FEMORAL COMPRESS: NORMAL
BH CV LOWER VASCULAR LEFT COMMON FEMORAL PHASIC: NORMAL
BH CV LOWER VASCULAR LEFT COMMON FEMORAL SPONT: NORMAL
BH CV LOWER VASCULAR LEFT DISTAL FEMORAL COMPRESS: NORMAL
BH CV LOWER VASCULAR LEFT GASTRONEMIUS COMPRESS: NORMAL
BH CV LOWER VASCULAR LEFT GREATER SAPH AK COMPRESS: NORMAL
BH CV LOWER VASCULAR LEFT GREATER SAPH BK COMPRESS: NORMAL
BH CV LOWER VASCULAR LEFT LESSER SAPH COMPRESS: NORMAL
BH CV LOWER VASCULAR LEFT MID FEMORAL AUGMENT: NORMAL
BH CV LOWER VASCULAR LEFT MID FEMORAL COMPETENT: NORMAL
BH CV LOWER VASCULAR LEFT MID FEMORAL COMPRESS: NORMAL
BH CV LOWER VASCULAR LEFT MID FEMORAL PHASIC: NORMAL
BH CV LOWER VASCULAR LEFT MID FEMORAL SPONT: NORMAL
BH CV LOWER VASCULAR LEFT PERONEAL AUGMENT: NORMAL
BH CV LOWER VASCULAR LEFT PERONEAL COMPETENT: NORMAL
BH CV LOWER VASCULAR LEFT PERONEAL COMPRESS: NORMAL
BH CV LOWER VASCULAR LEFT PERONEAL PHASIC: NORMAL
BH CV LOWER VASCULAR LEFT PERONEAL SPONT: NORMAL
BH CV LOWER VASCULAR LEFT POPLITEAL AUGMENT: NORMAL
BH CV LOWER VASCULAR LEFT POPLITEAL COMPETENT: NORMAL
BH CV LOWER VASCULAR LEFT POPLITEAL COMPRESS: NORMAL
BH CV LOWER VASCULAR LEFT POPLITEAL PHASIC: NORMAL
BH CV LOWER VASCULAR LEFT POPLITEAL SPONT: NORMAL
BH CV LOWER VASCULAR LEFT POSTERIOR TIBIAL AUGMENT: NORMAL
BH CV LOWER VASCULAR LEFT POSTERIOR TIBIAL COMPETENT: NORMAL
BH CV LOWER VASCULAR LEFT POSTERIOR TIBIAL COMPRESS: NORMAL
BH CV LOWER VASCULAR LEFT POSTERIOR TIBIAL PHASIC: NORMAL
BH CV LOWER VASCULAR LEFT POSTERIOR TIBIAL SPONT: NORMAL
BH CV LOWER VASCULAR LEFT PROXIMAL FEMORAL COMPRESS: NORMAL
BH CV LOWER VASCULAR RIGHT COMMON FEMORAL AUGMENT: NORMAL
BH CV LOWER VASCULAR RIGHT COMMON FEMORAL COMPETENT: NORMAL
BH CV LOWER VASCULAR RIGHT COMMON FEMORAL COMPRESS: NORMAL
BH CV LOWER VASCULAR RIGHT COMMON FEMORAL PHASIC: NORMAL
BH CV LOWER VASCULAR RIGHT COMMON FEMORAL SPONT: NORMAL
BH CV LOWER VASCULAR RIGHT DISTAL FEMORAL COMPRESS: NORMAL
BH CV LOWER VASCULAR RIGHT GASTRONEMIUS COMPRESS: NORMAL
BH CV LOWER VASCULAR RIGHT GREATER SAPH AK AUGMENT: NORMAL
BH CV LOWER VASCULAR RIGHT GREATER SAPH AK COMPETENT: NORMAL
BH CV LOWER VASCULAR RIGHT GREATER SAPH AK COMPRESS: NORMAL
BH CV LOWER VASCULAR RIGHT GREATER SAPH AK PHASIC: NORMAL
BH CV LOWER VASCULAR RIGHT GREATER SAPH AK SPONT: NORMAL
BH CV LOWER VASCULAR RIGHT GREATER SAPH BK COMPRESS: NORMAL
BH CV LOWER VASCULAR RIGHT LESSER SAPH COMPRESS: NORMAL
BH CV LOWER VASCULAR RIGHT MID FEMORAL AUGMENT: NORMAL
BH CV LOWER VASCULAR RIGHT MID FEMORAL COMPETENT: NORMAL
BH CV LOWER VASCULAR RIGHT MID FEMORAL COMPRESS: NORMAL
BH CV LOWER VASCULAR RIGHT MID FEMORAL PHASIC: NORMAL
BH CV LOWER VASCULAR RIGHT MID FEMORAL SPONT: NORMAL
BH CV LOWER VASCULAR RIGHT PERONEAL AUGMENT: NORMAL
BH CV LOWER VASCULAR RIGHT PERONEAL COMPETENT: NORMAL
BH CV LOWER VASCULAR RIGHT PERONEAL COMPRESS: NORMAL
BH CV LOWER VASCULAR RIGHT PERONEAL PHASIC: NORMAL
BH CV LOWER VASCULAR RIGHT PERONEAL SPONT: NORMAL
BH CV LOWER VASCULAR RIGHT POPLITEAL AUGMENT: NORMAL
BH CV LOWER VASCULAR RIGHT POPLITEAL COMPETENT: NORMAL
BH CV LOWER VASCULAR RIGHT POPLITEAL COMPRESS: NORMAL
BH CV LOWER VASCULAR RIGHT POPLITEAL PHASIC: NORMAL
BH CV LOWER VASCULAR RIGHT POPLITEAL SPONT: NORMAL
BH CV LOWER VASCULAR RIGHT POSTERIOR TIBIAL AUGMENT: NORMAL
BH CV LOWER VASCULAR RIGHT POSTERIOR TIBIAL COMPETENT: NORMAL
BH CV LOWER VASCULAR RIGHT POSTERIOR TIBIAL COMPRESS: NORMAL
BH CV LOWER VASCULAR RIGHT POSTERIOR TIBIAL PHASIC: NORMAL
BH CV LOWER VASCULAR RIGHT POSTERIOR TIBIAL SPONT: NORMAL
BH CV LOWER VASCULAR RIGHT PROXIMAL FEMORAL COMPRESS: NORMAL
BILIRUB SERPL-MCNC: 0.2 MG/DL (ref 0–1.2)
BILIRUB SERPL-MCNC: 0.4 MG/DL (ref 0–1.2)
BILIRUB UR QL STRIP: NEGATIVE
BOTTLE TYPE: ABNORMAL
BUN SERPL-MCNC: 62 MG/DL (ref 6–20)
BUN SERPL-MCNC: 62 MG/DL (ref 6–20)
BUN SERPL-MCNC: 64 MG/DL (ref 6–20)
BUN SERPL-MCNC: 65 MG/DL (ref 6–20)
BUN SERPL-MCNC: 65 MG/DL (ref 6–20)
BUN SERPL-MCNC: 66 MG/DL (ref 6–20)
BUN/CREAT SERPL: 12.9 (ref 7–25)
BUN/CREAT SERPL: 13.8 (ref 7–25)
BUN/CREAT SERPL: 13.8 (ref 7–25)
BUN/CREAT SERPL: 14.5 (ref 7–25)
BUN/CREAT SERPL: 14.9 (ref 7–25)
BUN/CREAT SERPL: 15.4 (ref 7–25)
BUN/CREAT SERPL: 15.4 (ref 7–25)
BUN/CREAT SERPL: 15.6 (ref 7–25)
BURR CELLS BLD QL SMEAR: ABNORMAL
CA-I BLDA-SCNC: 1.07 MMOL/L (ref 1.13–1.32)
CA-I BLDA-SCNC: 1.09 MMOL/L (ref 1.13–1.32)
CALCIUM SPEC-SCNC: 7.4 MG/DL (ref 8.6–10.5)
CALCIUM SPEC-SCNC: 7.4 MG/DL (ref 8.6–10.5)
CALCIUM SPEC-SCNC: 7.6 MG/DL (ref 8.6–10.5)
CALCIUM SPEC-SCNC: 7.6 MG/DL (ref 8.6–10.5)
CALCIUM SPEC-SCNC: 7.9 MG/DL (ref 8.6–10.5)
CALCIUM SPEC-SCNC: 9.3 MG/DL (ref 8.6–10.5)
CHLORIDE BLDA-SCNC: 106 MMOL/L (ref 98–107)
CHLORIDE BLDA-SCNC: 109 MMOL/L (ref 98–107)
CHLORIDE SERPL-SCNC: 102 MMOL/L (ref 98–107)
CHLORIDE SERPL-SCNC: 103 MMOL/L (ref 98–107)
CHLORIDE SERPL-SCNC: 105 MMOL/L (ref 98–107)
CHLORIDE SERPL-SCNC: 92 MMOL/L (ref 98–107)
CHLORIDE SERPL-SCNC: 97 MMOL/L (ref 98–107)
CHLORIDE SERPL-SCNC: 98 MMOL/L (ref 98–107)
CLARITY UR: CLEAR
CLUMPED PLATELETS: ABNORMAL
CO2 SERPL-SCNC: 10.1 MMOL/L (ref 22–29)
CO2 SERPL-SCNC: 10.1 MMOL/L (ref 22–29)
CO2 SERPL-SCNC: 13.1 MMOL/L (ref 22–29)
CO2 SERPL-SCNC: 18.6 MMOL/L (ref 22–29)
CO2 SERPL-SCNC: 20.1 MMOL/L (ref 22–29)
CO2 SERPL-SCNC: 22.7 MMOL/L (ref 22–29)
CO2 SERPL-SCNC: 22.9 MMOL/L (ref 22–29)
CO2 SERPL-SCNC: 9.3 MMOL/L (ref 22–29)
COLOR UR: YELLOW
CREAT SERPL-MCNC: 4.02 MG/DL (ref 0.76–1.27)
CREAT SERPL-MCNC: 4.16 MG/DL (ref 0.76–1.27)
CREAT SERPL-MCNC: 4.17 MG/DL (ref 0.76–1.27)
CREAT SERPL-MCNC: 4.28 MG/DL (ref 0.76–1.27)
CREAT SERPL-MCNC: 4.42 MG/DL (ref 0.76–1.27)
CREAT SERPL-MCNC: 4.63 MG/DL (ref 0.76–1.27)
CREAT SERPL-MCNC: 4.63 MG/DL (ref 0.76–1.27)
CREAT SERPL-MCNC: 5.03 MG/DL (ref 0.76–1.27)
D-LACTATE SERPL-SCNC: 1.3 MMOL/L
D-LACTATE SERPL-SCNC: 1.4 MMOL/L (ref 0.5–2)
D-LACTATE SERPL-SCNC: 1.5 MMOL/L
D-LACTATE SERPL-SCNC: 2.3 MMOL/L (ref 0.5–2)
DEPRECATED RDW RBC AUTO: 43.1 FL (ref 37–54)
DEPRECATED RDW RBC AUTO: 45.3 FL (ref 37–54)
DEPRECATED RDW RBC AUTO: 46.5 FL (ref 37–54)
DEVICE COMMENT: ABNORMAL
EGFRCR SERPLBLD CKD-EPI 2021: 13 ML/MIN/1.73
EGFRCR SERPLBLD CKD-EPI 2021: 14.3 ML/MIN/1.73
EGFRCR SERPLBLD CKD-EPI 2021: 14.3 ML/MIN/1.73
EGFRCR SERPLBLD CKD-EPI 2021: 15.1 ML/MIN/1.73
EGFRCR SERPLBLD CKD-EPI 2021: 15.7 ML/MIN/1.73
EGFRCR SERPLBLD CKD-EPI 2021: 16.2 ML/MIN/1.73
EGFRCR SERPLBLD CKD-EPI 2021: 16.3 ML/MIN/1.73
EGFRCR SERPLBLD CKD-EPI 2021: 16.9 ML/MIN/1.73
EOSINOPHIL # BLD AUTO: 0 10*3/MM3 (ref 0–0.4)
EOSINOPHIL # BLD AUTO: 0.14 10*3/MM3 (ref 0–0.4)
EOSINOPHIL NFR BLD AUTO: 0 % (ref 0.3–6.2)
EOSINOPHIL NFR BLD AUTO: 0.6 % (ref 0.3–6.2)
ERYTHROCYTE [DISTWIDTH] IN BLOOD BY AUTOMATED COUNT: 13.2 % (ref 12.3–15.4)
ERYTHROCYTE [DISTWIDTH] IN BLOOD BY AUTOMATED COUNT: 13.2 % (ref 12.3–15.4)
ERYTHROCYTE [DISTWIDTH] IN BLOOD BY AUTOMATED COUNT: 13.3 % (ref 12.3–15.4)
FERRITIN SERPL-MCNC: 727.8 NG/ML (ref 30–400)
FOLATE SERPL-MCNC: 9.92 NG/ML (ref 4.78–24.2)
GEN 5 2HR TROPONIN T REFLEX: 442 NG/L
GIANT PLATELETS: ABNORMAL
GLOBULIN UR ELPH-MCNC: 3.1 GM/DL
GLOBULIN UR ELPH-MCNC: 4 GM/DL
GLUCOSE BLDC GLUCOMTR-MCNC: 101 MG/DL (ref 70–99)
GLUCOSE BLDC GLUCOMTR-MCNC: 104 MG/DL (ref 70–99)
GLUCOSE BLDC GLUCOMTR-MCNC: 105 MG/DL (ref 70–99)
GLUCOSE BLDC GLUCOMTR-MCNC: 111 MG/DL (ref 70–99)
GLUCOSE BLDC GLUCOMTR-MCNC: 112 MG/DL (ref 70–99)
GLUCOSE BLDC GLUCOMTR-MCNC: 118 MG/DL (ref 70–99)
GLUCOSE BLDC GLUCOMTR-MCNC: 129 MG/DL (ref 70–99)
GLUCOSE BLDC GLUCOMTR-MCNC: 131 MG/DL (ref 70–99)
GLUCOSE BLDC GLUCOMTR-MCNC: 133 MG/DL (ref 70–99)
GLUCOSE BLDC GLUCOMTR-MCNC: 144 MG/DL (ref 70–99)
GLUCOSE BLDC GLUCOMTR-MCNC: 153 MG/DL (ref 70–99)
GLUCOSE BLDC GLUCOMTR-MCNC: 163 MG/DL (ref 70–99)
GLUCOSE BLDC GLUCOMTR-MCNC: 172 MG/DL (ref 70–99)
GLUCOSE BLDC GLUCOMTR-MCNC: 178 MG/DL (ref 70–99)
GLUCOSE BLDC GLUCOMTR-MCNC: 191 MG/DL (ref 70–99)
GLUCOSE BLDC GLUCOMTR-MCNC: 191 MG/DL (ref 70–99)
GLUCOSE BLDC GLUCOMTR-MCNC: 193 MG/DL (ref 70–99)
GLUCOSE BLDC GLUCOMTR-MCNC: 194 MG/DL (ref 70–99)
GLUCOSE BLDC GLUCOMTR-MCNC: 197 MG/DL (ref 70–99)
GLUCOSE BLDC GLUCOMTR-MCNC: 203 MG/DL (ref 70–99)
GLUCOSE BLDC GLUCOMTR-MCNC: 207 MG/DL (ref 70–99)
GLUCOSE BLDC GLUCOMTR-MCNC: 214 MG/DL (ref 70–99)
GLUCOSE BLDC GLUCOMTR-MCNC: 215 MG/DL (ref 70–99)
GLUCOSE BLDC GLUCOMTR-MCNC: 217 MG/DL (ref 70–99)
GLUCOSE BLDC GLUCOMTR-MCNC: 231 MG/DL (ref 70–99)
GLUCOSE BLDC GLUCOMTR-MCNC: 232 MG/DL (ref 70–99)
GLUCOSE BLDC GLUCOMTR-MCNC: 232 MG/DL (ref 70–99)
GLUCOSE BLDC GLUCOMTR-MCNC: 244 MG/DL (ref 70–99)
GLUCOSE BLDC GLUCOMTR-MCNC: 71 MG/DL (ref 70–99)
GLUCOSE BLDC GLUCOMTR-MCNC: 88 MG/DL (ref 70–99)
GLUCOSE SERPL-MCNC: 146 MG/DL (ref 65–99)
GLUCOSE SERPL-MCNC: 158 MG/DL (ref 65–99)
GLUCOSE SERPL-MCNC: 170 MG/DL (ref 65–99)
GLUCOSE SERPL-MCNC: 194 MG/DL (ref 65–99)
GLUCOSE SERPL-MCNC: 194 MG/DL (ref 65–99)
GLUCOSE SERPL-MCNC: 199 MG/DL (ref 65–99)
GLUCOSE SERPL-MCNC: 222 MG/DL (ref 65–99)
GLUCOSE SERPL-MCNC: 222 MG/DL (ref 65–99)
GLUCOSE UR STRIP-MCNC: ABNORMAL MG/DL
GRAM STN SPEC: ABNORMAL
HBA1C MFR BLD: 6.1 % (ref 4.8–5.6)
HCO3 BLDA-SCNC: 19.2 MMOL/L (ref 22–26)
HCO3 BLDA-SCNC: 8.2 MMOL/L (ref 22–26)
HCO3 BLDV-SCNC: 9.8 MMOL/L (ref 22–26)
HCT VFR BLD AUTO: 16.8 % (ref 37.5–51)
HCT VFR BLD AUTO: 22.6 % (ref 37.5–51)
HCT VFR BLD AUTO: 24.7 % (ref 37.5–51)
HCT VFR BLD CALC: 14 % (ref 38–51)
HCT VFR BLD CALC: 22 % (ref 38–51)
HEMODILUTION: NO
HEMODILUTION: NO
HGB BLD-MCNC: 5.6 G/DL (ref 13–17.7)
HGB BLD-MCNC: 7.1 G/DL (ref 13–17.7)
HGB BLD-MCNC: 7.8 G/DL (ref 13–17.7)
HGB BLDA-MCNC: 4.9 G/DL (ref 12–18)
HGB BLDA-MCNC: 7.4 G/DL (ref 12–18)
HGB BLDA-MCNC: 7.6 G/DL (ref 12–18)
HGB UR QL STRIP.AUTO: NEGATIVE
HOLD SPECIMEN: NORMAL
HOLD SPECIMEN: NORMAL
HYALINE CASTS UR QL AUTO: ABNORMAL /LPF
HYPOCHROMIA BLD QL: ABNORMAL
HYPOCHROMIA BLD QL: NORMAL
IMM GRANULOCYTES # BLD AUTO: 0.5 10*3/MM3 (ref 0–0.05)
IMM GRANULOCYTES # BLD AUTO: 1.34 10*3/MM3 (ref 0–0.05)
IMM GRANULOCYTES NFR BLD AUTO: 2.1 % (ref 0–0.5)
IMM GRANULOCYTES NFR BLD AUTO: 3.8 % (ref 0–0.5)
INHALED O2 CONCENTRATION: 30 %
INHALED O2 CONCENTRATION: 40 %
IRON 24H UR-MRATE: 13 MCG/DL (ref 59–158)
IRON SATN MFR SERPL: 8 % (ref 20–50)
ISOLATED FROM: ABNORMAL
ISOLATED FROM: ABNORMAL
KETONES UR QL STRIP: ABNORMAL
LARGE PLATELETS: ABNORMAL
LARGE PLATELETS: NORMAL
LEUKOCYTE ESTERASE UR QL STRIP.AUTO: NEGATIVE
LYMPHOCYTES # BLD AUTO: 0.61 10*3/MM3 (ref 0.7–3.1)
LYMPHOCYTES # BLD AUTO: 0.83 10*3/MM3 (ref 0.7–3.1)
LYMPHOCYTES # BLD MANUAL: 0.39 10*3/MM3 (ref 0.7–3.1)
LYMPHOCYTES NFR BLD AUTO: 2.4 % (ref 19.6–45.3)
LYMPHOCYTES NFR BLD AUTO: 2.6 % (ref 19.6–45.3)
LYMPHOCYTES NFR BLD MANUAL: 3 % (ref 5–12)
Lab: ABNORMAL
Lab: ABNORMAL
MAGNESIUM SERPL-MCNC: 1.4 MG/DL (ref 1.6–2.6)
MAGNESIUM SERPL-MCNC: 1.6 MG/DL (ref 1.6–2.6)
MAGNESIUM SERPL-MCNC: 1.6 MG/DL (ref 1.6–2.6)
MAGNESIUM SERPL-MCNC: 1.7 MG/DL (ref 1.6–2.6)
MAGNESIUM SERPL-MCNC: 1.9 MG/DL (ref 1.6–2.6)
MCH RBC QN AUTO: 29.2 PG (ref 26.6–33)
MCH RBC QN AUTO: 29.6 PG (ref 26.6–33)
MCH RBC QN AUTO: 30 PG (ref 26.6–33)
MCHC RBC AUTO-ENTMCNC: 31.4 G/DL (ref 31.5–35.7)
MCHC RBC AUTO-ENTMCNC: 31.6 G/DL (ref 31.5–35.7)
MCHC RBC AUTO-ENTMCNC: 33.3 G/DL (ref 31.5–35.7)
MCV RBC AUTO: 88.9 FL (ref 79–97)
MCV RBC AUTO: 92.5 FL (ref 79–97)
MCV RBC AUTO: 95.4 FL (ref 79–97)
METAMYELOCYTES NFR BLD MANUAL: 1 % (ref 0–0)
MICROCYTES BLD QL: ABNORMAL
MODALITY: ABNORMAL
MONOCYTES # BLD AUTO: 0.61 10*3/MM3 (ref 0.1–0.9)
MONOCYTES # BLD AUTO: 1.89 10*3/MM3 (ref 0.1–0.9)
MONOCYTES # BLD: 1.16 10*3/MM3 (ref 0.1–0.9)
MONOCYTES NFR BLD AUTO: 2.6 % (ref 5–12)
MONOCYTES NFR BLD AUTO: 5.4 % (ref 5–12)
NEUTROPHILS # BLD AUTO: 35.87 10*3/MM3 (ref 1.7–7)
NEUTROPHILS NFR BLD AUTO: 21.84 10*3/MM3 (ref 1.7–7)
NEUTROPHILS NFR BLD AUTO: 30.85 10*3/MM3 (ref 1.7–7)
NEUTROPHILS NFR BLD AUTO: 88 % (ref 42.7–76)
NEUTROPHILS NFR BLD AUTO: 91.7 % (ref 42.7–76)
NEUTROPHILS NFR BLD MANUAL: 89 % (ref 42.7–76)
NEUTS BAND NFR BLD MANUAL: 4 % (ref 0–5)
NEUTS HYPERSEG # BLD: ABNORMAL 10*3/UL
NITRITE UR QL STRIP: NEGATIVE
NOTIFIED WHO: ABNORMAL
NRBC BLD AUTO-RTO: 0.1 /100 WBC (ref 0–0.2)
NRBC BLD AUTO-RTO: 0.1 /100 WBC (ref 0–0.2)
OSMOLALITY SERPL: 309 MOSM/KG (ref 275–295)
PAW @ PEAK INSP FLOW SETTING VENT: 14 CMH2O
PCO2 BLDA: 14.9 MM HG (ref 35–45)
PCO2 BLDA: 24.1 MM HG (ref 35–45)
PCO2 BLDV: 22.2 MM HG (ref 41–51)
PEEP RESPIRATORY: 7 CM[H2O]
PH BLDA: 7.35 PH UNITS (ref 7.35–7.45)
PH BLDA: 7.51 PH UNITS (ref 7.35–7.45)
PH BLDV: 7.25 PH UNITS (ref 7.31–7.41)
PH UR STRIP.AUTO: 5.5 [PH] (ref 5–8)
PHOSPHATE SERPL-MCNC: 2.7 MG/DL (ref 2.5–4.5)
PHOSPHATE SERPL-MCNC: 3 MG/DL (ref 2.5–4.5)
PHOSPHATE SERPL-MCNC: 3.1 MG/DL (ref 2.5–4.5)
PHOSPHATE SERPL-MCNC: 3.2 MG/DL (ref 2.5–4.5)
PHOSPHATE SERPL-MCNC: 4.3 MG/DL (ref 2.5–4.5)
PHOSPHATE SERPL-MCNC: 4.4 MG/DL (ref 2.5–4.5)
PHOSPHATE SERPL-MCNC: 5.1 MG/DL (ref 2.5–4.5)
PLATELET # BLD AUTO: 369 10*3/MM3 (ref 140–450)
PLATELET # BLD AUTO: 505 10*3/MM3 (ref 140–450)
PLATELET # BLD AUTO: 534 10*3/MM3 (ref 140–450)
PMV BLD AUTO: 10 FL (ref 6–12)
PMV BLD AUTO: 9.8 FL (ref 6–12)
PMV BLD AUTO: 9.9 FL (ref 6–12)
PO2 BLD: 188 MM[HG] (ref 0–500)
PO2 BLDA: 56.4 MM HG (ref 80–100)
PO2 BLDA: 95.8 MM HG (ref 80–100)
PO2 BLDV: 62.2 MM HG (ref 35–42)
POIKILOCYTOSIS BLD QL SMEAR: ABNORMAL
POIKILOCYTOSIS BLD QL SMEAR: NORMAL
POLYCHROMASIA BLD QL SMEAR: ABNORMAL
POTASSIUM BLDA-SCNC: 3.8 MMOL/L (ref 3.5–5)
POTASSIUM BLDA-SCNC: 3.9 MMOL/L (ref 3.5–5)
POTASSIUM SERPL-SCNC: 3.5 MMOL/L (ref 3.5–5.2)
POTASSIUM SERPL-SCNC: 3.6 MMOL/L (ref 3.5–5.2)
POTASSIUM SERPL-SCNC: 3.7 MMOL/L (ref 3.5–5.2)
POTASSIUM SERPL-SCNC: 3.7 MMOL/L (ref 3.5–5.2)
POTASSIUM SERPL-SCNC: 3.8 MMOL/L (ref 3.5–5.2)
POTASSIUM SERPL-SCNC: 3.9 MMOL/L (ref 3.5–5.2)
POTASSIUM SERPL-SCNC: 3.9 MMOL/L (ref 3.5–5.2)
POTASSIUM SERPL-SCNC: 4.1 MMOL/L (ref 3.5–5.2)
PROMYELOCYTES NFR BLD MANUAL: 2 % (ref 0–0)
PROT SERPL-MCNC: 5.8 G/DL (ref 6–8.5)
PROT SERPL-MCNC: 7.1 G/DL (ref 6–8.5)
PROT UR QL STRIP: ABNORMAL
QT INTERVAL: 338 MS
QTC INTERVAL: 474 MS
RBC # BLD AUTO: 1.89 10*6/MM3 (ref 4.14–5.8)
RBC # BLD AUTO: 2.37 10*6/MM3 (ref 4.14–5.8)
RBC # BLD AUTO: 2.67 10*6/MM3 (ref 4.14–5.8)
RBC # UR STRIP: ABNORMAL /HPF
READ BACK: YES
READ BACK: YES
REF LAB TEST METHOD: ABNORMAL
RETICS # AUTO: 0.04 10*6/MM3 (ref 0.02–0.13)
RETICS/RBC NFR AUTO: 1.65 % (ref 0.7–1.9)
SAO2 % BLDCOA: 92.3 % (ref 95–99)
SAO2 % BLDCOA: 97.4 % (ref 95–99)
SAO2 % BLDCOV: 88.4 % (ref 45–75)
SCAN SLIDE: NORMAL
SMALL PLATELETS BLD QL SMEAR: ABNORMAL
SODIUM BLD-SCNC: 129 MMOL/L (ref 131–143)
SODIUM BLD-SCNC: 134 MMOL/L (ref 131–143)
SODIUM SERPL-SCNC: 130 MMOL/L (ref 136–145)
SODIUM SERPL-SCNC: 133 MMOL/L (ref 136–145)
SODIUM SERPL-SCNC: 135 MMOL/L (ref 136–145)
SODIUM SERPL-SCNC: 138 MMOL/L (ref 136–145)
SP GR UR STRIP: 1.01 (ref 1–1.03)
SQUAMOUS #/AREA URNS HPF: ABNORMAL /HPF
TIBC SERPL-MCNC: 161 MCG/DL (ref 298–536)
TRANSFERRIN SERPL-MCNC: 108 MG/DL (ref 200–360)
TROPONIN T DELTA: -2 NG/L
TROPONIN T SERPL HS-MCNC: 444 NG/L
UROBILINOGEN UR QL STRIP: ABNORMAL
VANCOMYCIN SERPL-MCNC: 22.6 MCG/ML (ref 5–40)
VARIANT LYMPHS NFR BLD MANUAL: 1 % (ref 19.6–45.3)
VIT B12 BLD-MCNC: 1019 PG/ML (ref 211–946)
WBC # UR STRIP: ABNORMAL /HPF
WBC MORPH BLD: NORMAL
WBC MORPH BLD: NORMAL
WBC NRBC COR # BLD AUTO: 23.8 10*3/MM3 (ref 3.4–10.8)
WBC NRBC COR # BLD AUTO: 35.06 10*3/MM3 (ref 3.4–10.8)
WBC NRBC COR # BLD AUTO: 38.57 10*3/MM3 (ref 3.4–10.8)
WHOLE BLOOD HOLD COAG: NORMAL
WHOLE BLOOD HOLD SPECIMEN: NORMAL

## 2024-01-01 PROCEDURE — 94660 CPAP INITIATION&MGMT: CPT

## 2024-01-01 PROCEDURE — 82746 ASSAY OF FOLIC ACID SERUM: CPT | Performed by: HOSPITALIST

## 2024-01-01 PROCEDURE — 84100 ASSAY OF PHOSPHORUS: CPT | Performed by: HOSPITALIST

## 2024-01-01 PROCEDURE — 25010000002 MORPHINE PER 10 MG: Performed by: FAMILY MEDICINE

## 2024-01-01 PROCEDURE — 36415 COLL VENOUS BLD VENIPUNCTURE: CPT

## 2024-01-01 PROCEDURE — 80051 ELECTROLYTE PANEL: CPT

## 2024-01-01 PROCEDURE — 93005 ELECTROCARDIOGRAM TRACING: CPT | Performed by: EMERGENCY MEDICINE

## 2024-01-01 PROCEDURE — 25010000002 MIDAZOLAM PER 1MG: Performed by: FAMILY MEDICINE

## 2024-01-01 PROCEDURE — 94799 UNLISTED PULMONARY SVC/PX: CPT

## 2024-01-01 PROCEDURE — 25010000002 GLYCOPYRROLATE 0.2 MG/ML SOLUTION: Performed by: PHYSICIAN ASSISTANT

## 2024-01-01 PROCEDURE — 25810000003 SODIUM CHLORIDE 0.9 % SOLUTION: Performed by: HOSPITALIST

## 2024-01-01 PROCEDURE — 84466 ASSAY OF TRANSFERRIN: CPT | Performed by: HOSPITALIST

## 2024-01-01 PROCEDURE — 85025 COMPLETE CBC W/AUTO DIFF WBC: CPT | Performed by: EMERGENCY MEDICINE

## 2024-01-01 PROCEDURE — 93010 ELECTROCARDIOGRAM REPORT: CPT | Performed by: INTERNAL MEDICINE

## 2024-01-01 PROCEDURE — 73620 X-RAY EXAM OF FOOT: CPT

## 2024-01-01 PROCEDURE — 84484 ASSAY OF TROPONIN QUANT: CPT | Performed by: EMERGENCY MEDICINE

## 2024-01-01 PROCEDURE — 99291 CRITICAL CARE FIRST HOUR: CPT

## 2024-01-01 PROCEDURE — 83605 ASSAY OF LACTIC ACID: CPT

## 2024-01-01 PROCEDURE — 82948 REAGENT STRIP/BLOOD GLUCOSE: CPT

## 2024-01-01 PROCEDURE — 76937 US GUIDE VASCULAR ACCESS: CPT | Performed by: PHYSICIAN ASSISTANT

## 2024-01-01 PROCEDURE — 99222 1ST HOSP IP/OBS MODERATE 55: CPT | Performed by: PODIATRIST

## 2024-01-01 PROCEDURE — 99291 CRITICAL CARE FIRST HOUR: CPT | Performed by: INTERNAL MEDICINE

## 2024-01-01 PROCEDURE — 83735 ASSAY OF MAGNESIUM: CPT | Performed by: HOSPITALIST

## 2024-01-01 PROCEDURE — 85007 BL SMEAR W/DIFF WBC COUNT: CPT | Performed by: EMERGENCY MEDICINE

## 2024-01-01 PROCEDURE — 25010000002 MORPHINE PER 10 MG: Performed by: INTERNAL MEDICINE

## 2024-01-01 PROCEDURE — 80048 BASIC METABOLIC PNL TOTAL CA: CPT | Performed by: HOSPITALIST

## 2024-01-01 PROCEDURE — 82803 BLOOD GASES ANY COMBINATION: CPT

## 2024-01-01 PROCEDURE — 70450 CT HEAD/BRAIN W/O DYE: CPT

## 2024-01-01 PROCEDURE — 82330 ASSAY OF CALCIUM: CPT

## 2024-01-01 PROCEDURE — 25010000002 CEFTRIAXONE PER 250 MG: Performed by: EMERGENCY MEDICINE

## 2024-01-01 PROCEDURE — 02HV33Z INSERTION OF INFUSION DEVICE INTO SUPERIOR VENA CAVA, PERCUTANEOUS APPROACH: ICD-10-PCS | Performed by: INTERNAL MEDICINE

## 2024-01-01 PROCEDURE — 87154 CUL TYP ID BLD PTHGN 6+ TRGT: CPT | Performed by: EMERGENCY MEDICINE

## 2024-01-01 PROCEDURE — 87040 BLOOD CULTURE FOR BACTERIA: CPT | Performed by: EMERGENCY MEDICINE

## 2024-01-01 PROCEDURE — 99232 SBSQ HOSP IP/OBS MODERATE 35: CPT | Performed by: INTERNAL MEDICINE

## 2024-01-01 PROCEDURE — 25010000002 PIPERACILLIN SOD-TAZOBACTAM PER 1 G: Performed by: INTERNAL MEDICINE

## 2024-01-01 PROCEDURE — 25010000002 MAGNESIUM SULFATE IN D5W 1G/100ML (PREMIX) 1-5 GM/100ML-% SOLUTION: Performed by: FAMILY MEDICINE

## 2024-01-01 PROCEDURE — 99223 1ST HOSP IP/OBS HIGH 75: CPT | Performed by: HOSPITALIST

## 2024-01-01 PROCEDURE — 25010000002 AMPICILLIN-SULBACTAM PER 1.5 G: Performed by: INTERNAL MEDICINE

## 2024-01-01 PROCEDURE — 83605 ASSAY OF LACTIC ACID: CPT | Performed by: EMERGENCY MEDICINE

## 2024-01-01 PROCEDURE — 85025 COMPLETE CBC W/AUTO DIFF WBC: CPT | Performed by: HOSPITALIST

## 2024-01-01 PROCEDURE — 25010000002 VANCOMYCIN 5 G RECONSTITUTED SOLUTION: Performed by: EMERGENCY MEDICINE

## 2024-01-01 PROCEDURE — 0 DEXTROSE 5 % SOLUTION: Performed by: PHYSICIAN ASSISTANT

## 2024-01-01 PROCEDURE — 85045 AUTOMATED RETICULOCYTE COUNT: CPT | Performed by: HOSPITALIST

## 2024-01-01 PROCEDURE — 87077 CULTURE AEROBIC IDENTIFY: CPT | Performed by: EMERGENCY MEDICINE

## 2024-01-01 PROCEDURE — 25810000003 SODIUM CHLORIDE 0.9 % SOLUTION: Performed by: INTERNAL MEDICINE

## 2024-01-01 PROCEDURE — 82948 REAGENT STRIP/BLOOD GLUCOSE: CPT | Performed by: EMERGENCY MEDICINE

## 2024-01-01 PROCEDURE — 82607 VITAMIN B-12: CPT | Performed by: HOSPITALIST

## 2024-01-01 PROCEDURE — 93970 EXTREMITY STUDY: CPT | Performed by: SURGERY

## 2024-01-01 PROCEDURE — 25010000002 HEPARIN (PORCINE) PER 1000 UNITS: Performed by: HOSPITALIST

## 2024-01-01 PROCEDURE — 25810000003 SODIUM CHLORIDE 0.9 % SOLUTION: Performed by: EMERGENCY MEDICINE

## 2024-01-01 PROCEDURE — 83036 HEMOGLOBIN GLYCOSYLATED A1C: CPT | Performed by: HOSPITALIST

## 2024-01-01 PROCEDURE — 25010000002 MORPHINE PER 10 MG: Performed by: STUDENT IN AN ORGANIZED HEALTH CARE EDUCATION/TRAINING PROGRAM

## 2024-01-01 PROCEDURE — 83735 ASSAY OF MAGNESIUM: CPT | Performed by: EMERGENCY MEDICINE

## 2024-01-01 PROCEDURE — 80202 ASSAY OF VANCOMYCIN: CPT | Performed by: HOSPITALIST

## 2024-01-01 PROCEDURE — 71045 X-RAY EXAM CHEST 1 VIEW: CPT

## 2024-01-01 PROCEDURE — 83540 ASSAY OF IRON: CPT | Performed by: HOSPITALIST

## 2024-01-01 PROCEDURE — 36556 INSERT NON-TUNNEL CV CATH: CPT | Performed by: PHYSICIAN ASSISTANT

## 2024-01-01 PROCEDURE — 93926 LOWER EXTREMITY STUDY: CPT

## 2024-01-01 PROCEDURE — 82728 ASSAY OF FERRITIN: CPT | Performed by: HOSPITALIST

## 2024-01-01 PROCEDURE — 93926 LOWER EXTREMITY STUDY: CPT | Performed by: SURGERY

## 2024-01-01 PROCEDURE — 25010000002 NA FERRIC GLUC CPLX PER 12.5 MG: Performed by: INTERNAL MEDICINE

## 2024-01-01 PROCEDURE — B548ZZA ULTRASONOGRAPHY OF SUPERIOR VENA CAVA, GUIDANCE: ICD-10-PCS | Performed by: INTERNAL MEDICINE

## 2024-01-01 PROCEDURE — 93970 EXTREMITY STUDY: CPT

## 2024-01-01 PROCEDURE — 25010000002 MIDAZOLAM PER 1MG: Performed by: INTERNAL MEDICINE

## 2024-01-01 PROCEDURE — 85007 BL SMEAR W/DIFF WBC COUNT: CPT | Performed by: HOSPITALIST

## 2024-01-01 PROCEDURE — 25010000002 LORAZEPAM PER 2 MG: Performed by: EMERGENCY MEDICINE

## 2024-01-01 PROCEDURE — 36600 WITHDRAWAL OF ARTERIAL BLOOD: CPT | Performed by: INTERNAL MEDICINE

## 2024-01-01 PROCEDURE — 25010000002 AZITHROMYCIN PER 500 MG: Performed by: EMERGENCY MEDICINE

## 2024-01-01 PROCEDURE — 87147 CULTURE TYPE IMMUNOLOGIC: CPT | Performed by: PHYSICIAN ASSISTANT

## 2024-01-01 PROCEDURE — 87070 CULTURE OTHR SPECIMN AEROBIC: CPT | Performed by: PHYSICIAN ASSISTANT

## 2024-01-01 PROCEDURE — 80053 COMPREHEN METABOLIC PANEL: CPT | Performed by: EMERGENCY MEDICINE

## 2024-01-01 PROCEDURE — 81001 URINALYSIS AUTO W/SCOPE: CPT | Performed by: EMERGENCY MEDICINE

## 2024-01-01 PROCEDURE — 83930 ASSAY OF BLOOD OSMOLALITY: CPT | Performed by: HOSPITALIST

## 2024-01-01 PROCEDURE — 87186 SC STD MICRODIL/AGAR DIL: CPT | Performed by: EMERGENCY MEDICINE

## 2024-01-01 PROCEDURE — 5A09357 ASSISTANCE WITH RESPIRATORY VENTILATION, LESS THAN 24 CONSECUTIVE HOURS, CONTINUOUS POSITIVE AIRWAY PRESSURE: ICD-10-PCS | Performed by: INTERNAL MEDICINE

## 2024-01-01 PROCEDURE — 88312 SPECIAL STAINS GROUP 1: CPT | Performed by: EMERGENCY MEDICINE

## 2024-01-01 PROCEDURE — 25010000002 MAGNESIUM SULFATE 2 GM/50ML SOLUTION: Performed by: PHYSICIAN ASSISTANT

## 2024-01-01 PROCEDURE — 82009 KETONE BODYS QUAL: CPT | Performed by: EMERGENCY MEDICINE

## 2024-01-01 PROCEDURE — 82803 BLOOD GASES ANY COMBINATION: CPT | Performed by: INTERNAL MEDICINE

## 2024-01-01 PROCEDURE — 36600 WITHDRAWAL OF ARTERIAL BLOOD: CPT

## 2024-01-01 PROCEDURE — 87205 SMEAR GRAM STAIN: CPT | Performed by: PHYSICIAN ASSISTANT

## 2024-01-01 RX ORDER — DEXTROSE MONOHYDRATE 25 G/50ML
25 INJECTION, SOLUTION INTRAVENOUS
Status: DISCONTINUED | OUTPATIENT
Start: 2024-01-01 | End: 2024-01-01 | Stop reason: HOSPADM

## 2024-01-01 RX ORDER — LORAZEPAM 2 MG/ML
0.5 CONCENTRATE ORAL
Status: DISCONTINUED | OUTPATIENT
Start: 2024-01-01 | End: 2024-01-01 | Stop reason: HOSPADM

## 2024-01-01 RX ORDER — LORAZEPAM 0.5 MG/1
1 TABLET ORAL
Status: CANCELLED | OUTPATIENT
Start: 2024-01-01 | End: 2024-06-17

## 2024-01-01 RX ORDER — SODIUM CHLORIDE 0.9 % (FLUSH) 0.9 %
10 SYRINGE (ML) INJECTION AS NEEDED
Status: DISCONTINUED | OUTPATIENT
Start: 2024-01-01 | End: 2024-01-01

## 2024-01-01 RX ORDER — LORAZEPAM 0.5 MG/1
1 TABLET ORAL
Status: DISCONTINUED | OUTPATIENT
Start: 2024-01-01 | End: 2024-01-01 | Stop reason: SDUPTHER

## 2024-01-01 RX ORDER — MIDAZOLAM HYDROCHLORIDE 2 MG/2ML
1 INJECTION, SOLUTION INTRAMUSCULAR; INTRAVENOUS
Status: CANCELLED | OUTPATIENT
Start: 2024-01-01 | End: 2024-06-17

## 2024-01-01 RX ORDER — SODIUM CHLORIDE 450 MG/100ML
250 INJECTION, SOLUTION INTRAVENOUS CONTINUOUS PRN
Status: DISCONTINUED | OUTPATIENT
Start: 2024-01-01 | End: 2024-01-01

## 2024-01-01 RX ORDER — LORAZEPAM 2 MG/ML
0.5 CONCENTRATE ORAL
Status: DISCONTINUED | OUTPATIENT
Start: 2024-01-01 | End: 2024-01-01 | Stop reason: SDUPTHER

## 2024-01-01 RX ORDER — LORAZEPAM 0.5 MG/1
2 TABLET ORAL
Status: DISCONTINUED | OUTPATIENT
Start: 2024-01-01 | End: 2024-01-01 | Stop reason: HOSPADM

## 2024-01-01 RX ORDER — BISACODYL 5 MG/1
5 TABLET, DELAYED RELEASE ORAL DAILY PRN
Status: DISCONTINUED | OUTPATIENT
Start: 2024-01-01 | End: 2024-01-01 | Stop reason: HOSPADM

## 2024-01-01 RX ORDER — MORPHINE SULFATE 20 MG/ML
10 SOLUTION ORAL
Status: DISCONTINUED | OUTPATIENT
Start: 2024-01-01 | End: 2024-01-01

## 2024-01-01 RX ORDER — MIDAZOLAM HYDROCHLORIDE 2 MG/2ML
4 INJECTION, SOLUTION INTRAMUSCULAR; INTRAVENOUS
Status: DISCONTINUED | OUTPATIENT
Start: 2024-01-01 | End: 2024-01-01

## 2024-01-01 RX ORDER — MORPHINE SULFATE 20 MG/ML
2 SOLUTION ORAL
Status: DISCONTINUED | OUTPATIENT
Start: 2024-01-01 | End: 2024-01-01 | Stop reason: SDUPTHER

## 2024-01-01 RX ORDER — LORAZEPAM 2 MG/ML
2 CONCENTRATE ORAL
Status: CANCELLED | OUTPATIENT
Start: 2024-01-01 | End: 2024-06-17

## 2024-01-01 RX ORDER — GLYCOPYRROLATE 0.2 MG/ML
0.1 INJECTION INTRAMUSCULAR; INTRAVENOUS
Status: DISCONTINUED | OUTPATIENT
Start: 2024-01-01 | End: 2024-01-01 | Stop reason: HOSPADM

## 2024-01-01 RX ORDER — HYDRALAZINE HYDROCHLORIDE 20 MG/ML
10 INJECTION INTRAMUSCULAR; INTRAVENOUS EVERY 6 HOURS PRN
Status: DISCONTINUED | OUTPATIENT
Start: 2024-01-01 | End: 2024-01-01 | Stop reason: HOSPADM

## 2024-01-01 RX ORDER — VANCOMYCIN/0.9 % SOD CHLORIDE 1.5G/250ML
20 PLASTIC BAG, INJECTION (ML) INTRAVENOUS ONCE
Status: COMPLETED | OUTPATIENT
Start: 2024-01-01 | End: 2024-01-01

## 2024-01-01 RX ORDER — LORAZEPAM 2 MG/ML
2 CONCENTRATE ORAL
Status: DISCONTINUED | OUTPATIENT
Start: 2024-01-01 | End: 2024-01-01 | Stop reason: HOSPADM

## 2024-01-01 RX ORDER — DEXTROSE MONOHYDRATE 25 G/50ML
10-50 INJECTION, SOLUTION INTRAVENOUS
Status: DISCONTINUED | OUTPATIENT
Start: 2024-01-01 | End: 2024-01-01

## 2024-01-01 RX ORDER — SODIUM CHLORIDE 0.9 % (FLUSH) 0.9 %
10 SYRINGE (ML) INJECTION EVERY 12 HOURS SCHEDULED
Status: DISCONTINUED | OUTPATIENT
Start: 2024-01-01 | End: 2024-01-01 | Stop reason: HOSPADM

## 2024-01-01 RX ORDER — DIPHENOXYLATE HYDROCHLORIDE AND ATROPINE SULFATE 2.5; .025 MG/1; MG/1
1 TABLET ORAL
Status: DISCONTINUED | OUTPATIENT
Start: 2024-01-01 | End: 2024-01-01 | Stop reason: HOSPADM

## 2024-01-01 RX ORDER — LORAZEPAM 2 MG/ML
1 CONCENTRATE ORAL
Status: DISCONTINUED | OUTPATIENT
Start: 2024-01-01 | End: 2024-01-01 | Stop reason: SDUPTHER

## 2024-01-01 RX ORDER — NICOTINE POLACRILEX 4 MG
15 LOZENGE BUCCAL
Status: DISCONTINUED | OUTPATIENT
Start: 2024-01-01 | End: 2024-01-01 | Stop reason: HOSPADM

## 2024-01-01 RX ORDER — MIDAZOLAM HYDROCHLORIDE 2 MG/2ML
2 INJECTION, SOLUTION INTRAMUSCULAR; INTRAVENOUS
Status: CANCELLED | OUTPATIENT
Start: 2024-01-01 | End: 2024-06-17

## 2024-01-01 RX ORDER — MORPHINE SULFATE 20 MG/ML
20 SOLUTION ORAL
Status: DISCONTINUED | OUTPATIENT
Start: 2024-01-01 | End: 2024-01-01 | Stop reason: HOSPADM

## 2024-01-01 RX ORDER — NITROGLYCERIN 0.4 MG/1
0.4 TABLET SUBLINGUAL
Status: DISCONTINUED | OUTPATIENT
Start: 2024-01-01 | End: 2024-01-01

## 2024-01-01 RX ORDER — LORAZEPAM 0.5 MG/1
1 TABLET ORAL
Status: DISCONTINUED | OUTPATIENT
Start: 2024-01-01 | End: 2024-01-01 | Stop reason: HOSPADM

## 2024-01-01 RX ORDER — BISACODYL 10 MG
10 SUPPOSITORY, RECTAL RECTAL DAILY PRN
Status: CANCELLED | OUTPATIENT
Start: 2024-01-01

## 2024-01-01 RX ORDER — DEXTROSE MONOHYDRATE AND SODIUM CHLORIDE 5; .45 G/100ML; G/100ML
150 INJECTION, SOLUTION INTRAVENOUS CONTINUOUS PRN
Status: DISCONTINUED | OUTPATIENT
Start: 2024-01-01 | End: 2024-01-01

## 2024-01-01 RX ORDER — DEXMEDETOMIDINE HYDROCHLORIDE 4 UG/ML
.2-1.5 INJECTION, SOLUTION INTRAVENOUS
Status: DISCONTINUED | OUTPATIENT
Start: 2024-01-01 | End: 2024-01-01

## 2024-01-01 RX ORDER — DEXTROSE MONOHYDRATE, SODIUM CHLORIDE, AND POTASSIUM CHLORIDE 50; 1.49; 9 G/1000ML; G/1000ML; G/1000ML
150 INJECTION, SOLUTION INTRAVENOUS CONTINUOUS PRN
Status: DISCONTINUED | OUTPATIENT
Start: 2024-01-01 | End: 2024-01-01

## 2024-01-01 RX ORDER — MIDAZOLAM HYDROCHLORIDE 2 MG/2ML
2 INJECTION, SOLUTION INTRAMUSCULAR; INTRAVENOUS
Status: DISCONTINUED | OUTPATIENT
Start: 2024-01-01 | End: 2024-01-01

## 2024-01-01 RX ORDER — SODIUM CHLORIDE 9 MG/ML
40 INJECTION, SOLUTION INTRAVENOUS AS NEEDED
Status: DISCONTINUED | OUTPATIENT
Start: 2024-01-01 | End: 2024-01-01 | Stop reason: HOSPADM

## 2024-01-01 RX ORDER — ACETAMINOPHEN 325 MG/1
650 TABLET ORAL EVERY 4 HOURS PRN
Status: CANCELLED | OUTPATIENT
Start: 2024-01-01

## 2024-01-01 RX ORDER — LORAZEPAM 2 MG/ML
0.5 CONCENTRATE ORAL
Status: CANCELLED | OUTPATIENT
Start: 2024-01-01 | End: 2024-06-17

## 2024-01-01 RX ORDER — NICOTINE 21 MG/24HR
1 PATCH, TRANSDERMAL 24 HOURS TRANSDERMAL
Status: DISCONTINUED | OUTPATIENT
Start: 2024-01-01 | End: 2024-01-01 | Stop reason: HOSPADM

## 2024-01-01 RX ORDER — DIPHENOXYLATE HYDROCHLORIDE AND ATROPINE SULFATE 2.5; .025 MG/1; MG/1
1 TABLET ORAL
Status: CANCELLED | OUTPATIENT
Start: 2024-01-01

## 2024-01-01 RX ORDER — DEXTROSE MONOHYDRATE, SODIUM CHLORIDE, AND POTASSIUM CHLORIDE 50; 2.98; 9 G/1000ML; G/1000ML; G/1000ML
150 INJECTION, SOLUTION INTRAVENOUS CONTINUOUS PRN
Status: DISCONTINUED | OUTPATIENT
Start: 2024-01-01 | End: 2024-01-01

## 2024-01-01 RX ORDER — ACETAMINOPHEN 160 MG/5ML
650 SOLUTION ORAL EVERY 4 HOURS PRN
Status: CANCELLED | OUTPATIENT
Start: 2024-01-01

## 2024-01-01 RX ORDER — MORPHINE SULFATE 2 MG/ML
2 INJECTION, SOLUTION INTRAMUSCULAR; INTRAVENOUS
Status: DISCONTINUED | OUTPATIENT
Start: 2024-01-01 | End: 2024-01-01 | Stop reason: HOSPADM

## 2024-01-01 RX ORDER — MIDAZOLAM HYDROCHLORIDE 2 MG/2ML
1 INJECTION, SOLUTION INTRAMUSCULAR; INTRAVENOUS
Status: DISCONTINUED | OUTPATIENT
Start: 2024-01-01 | End: 2024-01-01 | Stop reason: HOSPADM

## 2024-01-01 RX ORDER — ACETAMINOPHEN 160 MG/5ML
650 SOLUTION ORAL EVERY 4 HOURS PRN
Status: DISCONTINUED | OUTPATIENT
Start: 2024-01-01 | End: 2024-01-01

## 2024-01-01 RX ORDER — MIDAZOLAM HYDROCHLORIDE 2 MG/2ML
4 INJECTION, SOLUTION INTRAMUSCULAR; INTRAVENOUS
Status: DISCONTINUED | OUTPATIENT
Start: 2024-01-01 | End: 2024-01-01 | Stop reason: HOSPADM

## 2024-01-01 RX ORDER — MIDAZOLAM HYDROCHLORIDE 2 MG/2ML
2 INJECTION, SOLUTION INTRAMUSCULAR; INTRAVENOUS
Status: DISCONTINUED | OUTPATIENT
Start: 2024-01-01 | End: 2024-01-01 | Stop reason: HOSPADM

## 2024-01-01 RX ORDER — LORAZEPAM 2 MG/ML
1 CONCENTRATE ORAL
Status: CANCELLED | OUTPATIENT
Start: 2024-01-01 | End: 2024-06-17

## 2024-01-01 RX ORDER — SODIUM CHLORIDE 0.9 % (FLUSH) 0.9 %
10 SYRINGE (ML) INJECTION AS NEEDED
Status: DISCONTINUED | OUTPATIENT
Start: 2024-01-01 | End: 2024-01-01 | Stop reason: HOSPADM

## 2024-01-01 RX ORDER — ACETAMINOPHEN 160 MG/5ML
650 SOLUTION ORAL EVERY 4 HOURS PRN
Status: DISCONTINUED | OUTPATIENT
Start: 2024-01-01 | End: 2024-01-01 | Stop reason: HOSPADM

## 2024-01-01 RX ORDER — ACETAMINOPHEN 650 MG/1
650 SUPPOSITORY RECTAL EVERY 4 HOURS PRN
Status: DISCONTINUED | OUTPATIENT
Start: 2024-01-01 | End: 2024-01-01

## 2024-01-01 RX ORDER — IBUPROFEN 600 MG/1
1 TABLET ORAL
Status: DISCONTINUED | OUTPATIENT
Start: 2024-01-01 | End: 2024-01-01 | Stop reason: HOSPADM

## 2024-01-01 RX ORDER — ATROPINE SULFATE 10 MG/ML
2 SOLUTION/ DROPS OPHTHALMIC 2 TIMES DAILY PRN
Status: CANCELLED | OUTPATIENT
Start: 2024-01-01

## 2024-01-01 RX ORDER — SODIUM CHLORIDE 0.9 % (FLUSH) 0.9 %
10 SYRINGE (ML) INJECTION EVERY 12 HOURS SCHEDULED
Status: CANCELLED | OUTPATIENT
Start: 2024-01-01

## 2024-01-01 RX ORDER — AMOXICILLIN 250 MG
2 CAPSULE ORAL 2 TIMES DAILY
Status: CANCELLED | OUTPATIENT
Start: 2024-01-01

## 2024-01-01 RX ORDER — FAMOTIDINE 10 MG/ML
20 INJECTION, SOLUTION INTRAVENOUS DAILY
Status: DISCONTINUED | OUTPATIENT
Start: 2024-01-01 | End: 2024-01-01

## 2024-01-01 RX ORDER — IBUPROFEN 600 MG/1
1 TABLET ORAL
Status: DISCONTINUED | OUTPATIENT
Start: 2024-01-01 | End: 2024-01-01

## 2024-01-01 RX ORDER — LORAZEPAM 2 MG/ML
1 CONCENTRATE ORAL
Status: DISCONTINUED | OUTPATIENT
Start: 2024-01-01 | End: 2024-01-01

## 2024-01-01 RX ORDER — LORAZEPAM 0.5 MG/1
2 TABLET ORAL
Status: DISCONTINUED | OUTPATIENT
Start: 2024-01-01 | End: 2024-01-01

## 2024-01-01 RX ORDER — LORAZEPAM 0.5 MG/1
1 TABLET ORAL
Status: DISCONTINUED | OUTPATIENT
Start: 2024-01-01 | End: 2024-01-01

## 2024-01-01 RX ORDER — ACETAMINOPHEN 325 MG/1
650 TABLET ORAL EVERY 4 HOURS PRN
Status: DISCONTINUED | OUTPATIENT
Start: 2024-01-01 | End: 2024-01-01 | Stop reason: SDUPTHER

## 2024-01-01 RX ORDER — DEXTROSE MONOHYDRATE, SODIUM CHLORIDE, AND POTASSIUM CHLORIDE 50; 2.98; 4.5 G/1000ML; G/1000ML; G/1000ML
150 INJECTION, SOLUTION INTRAVENOUS CONTINUOUS PRN
Status: DISCONTINUED | OUTPATIENT
Start: 2024-01-01 | End: 2024-01-01

## 2024-01-01 RX ORDER — AMOXICILLIN 250 MG
2 CAPSULE ORAL 2 TIMES DAILY
Status: DISCONTINUED | OUTPATIENT
Start: 2024-01-01 | End: 2024-01-01 | Stop reason: HOSPADM

## 2024-01-01 RX ORDER — SODIUM CHLORIDE 0.9 % (FLUSH) 0.9 %
10 SYRINGE (ML) INJECTION AS NEEDED
Status: CANCELLED | OUTPATIENT
Start: 2024-01-01

## 2024-01-01 RX ORDER — ACETAMINOPHEN 325 MG/1
650 TABLET ORAL EVERY 4 HOURS PRN
Status: DISCONTINUED | OUTPATIENT
Start: 2024-01-01 | End: 2024-01-01

## 2024-01-01 RX ORDER — NICOTINE POLACRILEX 4 MG
15 LOZENGE BUCCAL
Status: DISCONTINUED | OUTPATIENT
Start: 2024-01-01 | End: 2024-01-01

## 2024-01-01 RX ORDER — POLYETHYLENE GLYCOL 3350 17 G/17G
17 POWDER, FOR SOLUTION ORAL DAILY PRN
Status: DISCONTINUED | OUTPATIENT
Start: 2024-01-01 | End: 2024-01-01 | Stop reason: HOSPADM

## 2024-01-01 RX ORDER — BISACODYL 5 MG/1
5 TABLET, DELAYED RELEASE ORAL DAILY PRN
Status: CANCELLED | OUTPATIENT
Start: 2024-01-01

## 2024-01-01 RX ORDER — ACETAMINOPHEN 650 MG/1
650 SUPPOSITORY RECTAL EVERY 4 HOURS PRN
Status: CANCELLED | OUTPATIENT
Start: 2024-01-01

## 2024-01-01 RX ORDER — ACETAMINOPHEN 650 MG/1
650 SUPPOSITORY RECTAL EVERY 4 HOURS PRN
Status: DISCONTINUED | OUTPATIENT
Start: 2024-01-01 | End: 2024-01-01 | Stop reason: HOSPADM

## 2024-01-01 RX ORDER — MIDAZOLAM HYDROCHLORIDE 2 MG/2ML
1 INJECTION, SOLUTION INTRAMUSCULAR; INTRAVENOUS
Status: DISCONTINUED | OUTPATIENT
Start: 2024-01-01 | End: 2024-01-01 | Stop reason: SDUPTHER

## 2024-01-01 RX ORDER — LORAZEPAM 2 MG/ML
1 INJECTION INTRAMUSCULAR ONCE
Status: COMPLETED | OUTPATIENT
Start: 2024-01-01 | End: 2024-01-01

## 2024-01-01 RX ORDER — SODIUM CHLORIDE 9 MG/ML
250 INJECTION, SOLUTION INTRAVENOUS CONTINUOUS PRN
Status: DISCONTINUED | OUTPATIENT
Start: 2024-01-01 | End: 2024-01-01

## 2024-01-01 RX ORDER — SODIUM CHLORIDE 9 MG/ML
40 INJECTION, SOLUTION INTRAVENOUS AS NEEDED
Status: DISCONTINUED | OUTPATIENT
Start: 2024-01-01 | End: 2024-01-01

## 2024-01-01 RX ORDER — LORAZEPAM 0.5 MG/1
0.5 TABLET ORAL
Status: DISCONTINUED | OUTPATIENT
Start: 2024-01-01 | End: 2024-01-01 | Stop reason: SDUPTHER

## 2024-01-01 RX ORDER — LORAZEPAM 0.5 MG/1
0.5 TABLET ORAL
Status: CANCELLED | OUTPATIENT
Start: 2024-01-01 | End: 2024-06-17

## 2024-01-01 RX ORDER — ACETAMINOPHEN 325 MG/1
650 TABLET ORAL EVERY 4 HOURS PRN
Status: DISCONTINUED | OUTPATIENT
Start: 2024-01-01 | End: 2024-01-01 | Stop reason: HOSPADM

## 2024-01-01 RX ORDER — LORAZEPAM 2 MG/ML
2 CONCENTRATE ORAL
Status: DISCONTINUED | OUTPATIENT
Start: 2024-01-01 | End: 2024-01-01

## 2024-01-01 RX ORDER — ACETAMINOPHEN 160 MG/5ML
650 SOLUTION ORAL EVERY 4 HOURS PRN
Status: DISCONTINUED | OUTPATIENT
Start: 2024-01-01 | End: 2024-01-01 | Stop reason: SDUPTHER

## 2024-01-01 RX ORDER — SODIUM CHLORIDE AND POTASSIUM CHLORIDE 150; 450 MG/100ML; MG/100ML
250 INJECTION, SOLUTION INTRAVENOUS CONTINUOUS PRN
Status: DISCONTINUED | OUTPATIENT
Start: 2024-01-01 | End: 2024-01-01

## 2024-01-01 RX ORDER — DEXTROSE MONOHYDRATE AND SODIUM CHLORIDE 5; .9 G/100ML; G/100ML
150 INJECTION, SOLUTION INTRAVENOUS CONTINUOUS PRN
Status: DISCONTINUED | OUTPATIENT
Start: 2024-01-01 | End: 2024-01-01

## 2024-01-01 RX ORDER — MORPHINE SULFATE 2 MG/ML
1 INJECTION, SOLUTION INTRAMUSCULAR; INTRAVENOUS EVERY 6 HOURS
Status: DISCONTINUED | OUTPATIENT
Start: 2024-01-01 | End: 2024-01-01

## 2024-01-01 RX ORDER — MAGNESIUM SULFATE 1 G/100ML
1 INJECTION INTRAVENOUS ONCE
Status: COMPLETED | OUTPATIENT
Start: 2024-01-01 | End: 2024-01-01

## 2024-01-01 RX ORDER — ATROPINE SULFATE 10 MG/ML
2 SOLUTION/ DROPS OPHTHALMIC 2 TIMES DAILY PRN
Status: DISCONTINUED | OUTPATIENT
Start: 2024-01-01 | End: 2024-01-01 | Stop reason: HOSPADM

## 2024-01-01 RX ORDER — MIDAZOLAM HYDROCHLORIDE 2 MG/2ML
2 INJECTION, SOLUTION INTRAMUSCULAR; INTRAVENOUS
Status: DISCONTINUED | OUTPATIENT
Start: 2024-01-01 | End: 2024-01-01 | Stop reason: SDUPTHER

## 2024-01-01 RX ORDER — SODIUM CHLORIDE AND POTASSIUM CHLORIDE 150; 900 MG/100ML; MG/100ML
250 INJECTION, SOLUTION INTRAVENOUS CONTINUOUS PRN
Status: DISCONTINUED | OUTPATIENT
Start: 2024-01-01 | End: 2024-01-01

## 2024-01-01 RX ORDER — MORPHINE SULFATE 2 MG/ML
2 INJECTION, SOLUTION INTRAMUSCULAR; INTRAVENOUS ONCE
Status: COMPLETED | OUTPATIENT
Start: 2024-01-01 | End: 2024-01-01

## 2024-01-01 RX ORDER — LORAZEPAM 0.5 MG/1
2 TABLET ORAL
Status: CANCELLED | OUTPATIENT
Start: 2024-01-01 | End: 2024-06-17

## 2024-01-01 RX ORDER — ACETAMINOPHEN 10 MG/ML
1000 INJECTION, SOLUTION INTRAVENOUS EVERY 8 HOURS PRN
Status: DISCONTINUED | OUTPATIENT
Start: 2024-01-01 | End: 2024-01-01 | Stop reason: HOSPADM

## 2024-01-01 RX ORDER — MORPHINE SULFATE 20 MG/ML
10 SOLUTION ORAL
Status: CANCELLED | OUTPATIENT
Start: 2024-01-01 | End: 2024-06-17

## 2024-01-01 RX ORDER — HEPARIN SODIUM 5000 [USP'U]/ML
5000 INJECTION, SOLUTION INTRAVENOUS; SUBCUTANEOUS EVERY 8 HOURS SCHEDULED
Status: DISCONTINUED | OUTPATIENT
Start: 2024-01-01 | End: 2024-01-01

## 2024-01-01 RX ORDER — SODIUM CHLORIDE 0.9 % (FLUSH) 0.9 %
10 SYRINGE (ML) INJECTION EVERY 12 HOURS SCHEDULED
Status: DISCONTINUED | OUTPATIENT
Start: 2024-01-01 | End: 2024-01-01

## 2024-01-01 RX ORDER — MORPHINE SULFATE 20 MG/ML
5 SOLUTION ORAL
Status: DISCONTINUED | OUTPATIENT
Start: 2024-01-01 | End: 2024-01-01 | Stop reason: HOSPADM

## 2024-01-01 RX ORDER — MORPHINE SULFATE 2 MG/ML
1 INJECTION, SOLUTION INTRAMUSCULAR; INTRAVENOUS EVERY 6 HOURS
Status: CANCELLED | OUTPATIENT
Start: 2024-01-01 | End: 2024-06-18

## 2024-01-01 RX ORDER — ACETAMINOPHEN 650 MG/1
650 SUPPOSITORY RECTAL EVERY 4 HOURS PRN
Status: DISCONTINUED | OUTPATIENT
Start: 2024-01-01 | End: 2024-01-01 | Stop reason: SDUPTHER

## 2024-01-01 RX ORDER — MAGNESIUM SULFATE HEPTAHYDRATE 40 MG/ML
2 INJECTION, SOLUTION INTRAVENOUS
Status: COMPLETED | OUTPATIENT
Start: 2024-01-01 | End: 2024-01-01

## 2024-01-01 RX ORDER — DIPHENOXYLATE HYDROCHLORIDE AND ATROPINE SULFATE 2.5; .025 MG/1; MG/1
1 TABLET ORAL
Status: DISCONTINUED | OUTPATIENT
Start: 2024-01-01 | End: 2024-01-01 | Stop reason: SDUPTHER

## 2024-01-01 RX ORDER — SODIUM CHLORIDE AND POTASSIUM CHLORIDE 300; 900 MG/100ML; MG/100ML
250 INJECTION, SOLUTION INTRAVENOUS CONTINUOUS PRN
Status: DISCONTINUED | OUTPATIENT
Start: 2024-01-01 | End: 2024-01-01

## 2024-01-01 RX ORDER — SODIUM CHLORIDE 9 MG/ML
40 INJECTION, SOLUTION INTRAVENOUS AS NEEDED
Status: CANCELLED | OUTPATIENT
Start: 2024-01-01

## 2024-01-01 RX ORDER — DEXTROSE MONOHYDRATE, SODIUM CHLORIDE, AND POTASSIUM CHLORIDE 50; 1.49; 4.5 G/1000ML; G/1000ML; G/1000ML
150 INJECTION, SOLUTION INTRAVENOUS CONTINUOUS PRN
Status: DISCONTINUED | OUTPATIENT
Start: 2024-01-01 | End: 2024-01-01

## 2024-01-01 RX ORDER — LORAZEPAM 0.5 MG/1
0.5 TABLET ORAL
Status: DISCONTINUED | OUTPATIENT
Start: 2024-01-01 | End: 2024-01-01 | Stop reason: HOSPADM

## 2024-01-01 RX ORDER — NOREPINEPHRINE BITARTRATE 0.03 MG/ML
.02-.3 INJECTION, SOLUTION INTRAVENOUS
Status: DISCONTINUED | OUTPATIENT
Start: 2024-01-01 | End: 2024-01-01

## 2024-01-01 RX ORDER — BISACODYL 10 MG
10 SUPPOSITORY, RECTAL RECTAL DAILY PRN
Status: DISCONTINUED | OUTPATIENT
Start: 2024-01-01 | End: 2024-01-01 | Stop reason: HOSPADM

## 2024-01-01 RX ORDER — MIDAZOLAM HYDROCHLORIDE 2 MG/2ML
4 INJECTION, SOLUTION INTRAMUSCULAR; INTRAVENOUS
Status: CANCELLED | OUTPATIENT
Start: 2024-01-01 | End: 2024-06-17

## 2024-01-01 RX ORDER — IPRATROPIUM BROMIDE AND ALBUTEROL SULFATE 2.5; .5 MG/3ML; MG/3ML
3 SOLUTION RESPIRATORY (INHALATION) EVERY 4 HOURS PRN
Status: DISCONTINUED | OUTPATIENT
Start: 2024-01-01 | End: 2024-01-01 | Stop reason: HOSPADM

## 2024-01-01 RX ORDER — POLYETHYLENE GLYCOL 3350 17 G/17G
17 POWDER, FOR SOLUTION ORAL DAILY PRN
Status: CANCELLED | OUTPATIENT
Start: 2024-01-01

## 2024-01-01 RX ADMIN — Medication 10 ML: at 20:43

## 2024-01-01 RX ADMIN — MORPHINE SULFATE 5 MG: 10 SOLUTION ORAL at 06:43

## 2024-01-01 RX ADMIN — ACETAMINOPHEN 650 MG: 160 SOLUTION ORAL at 07:41

## 2024-01-01 RX ADMIN — PIPERACILLIN AND TAZOBACTAM 4.5 G: 4; .5 INJECTION, POWDER, FOR SOLUTION INTRAVENOUS at 18:54

## 2024-01-01 RX ADMIN — Medication 10 ML: at 08:03

## 2024-01-01 RX ADMIN — MAGNESIUM SULFATE 1 G: 1 INJECTION INTRAVENOUS at 22:16

## 2024-01-01 RX ADMIN — POTASSIUM CHLORIDE, DEXTROSE MONOHYDRATE AND SODIUM CHLORIDE 150 ML/HR: 150; 5; 900 INJECTION, SOLUTION INTRAVENOUS at 12:05

## 2024-01-01 RX ADMIN — GLYCOPYRROLATE 0.1 MG: 0.2 INJECTION INTRAMUSCULAR; INTRAVENOUS at 04:52

## 2024-01-01 RX ADMIN — DEXMEDETOMIDINE HYDROCHLORIDE 1.5 MCG/KG/HR: 4 INJECTION, SOLUTION INTRAVENOUS at 21:04

## 2024-01-01 RX ADMIN — MORPHINE SULFATE 4 MG: 4 INJECTION, SOLUTION INTRAMUSCULAR; INTRAVENOUS at 11:45

## 2024-01-01 RX ADMIN — POTASSIUM CHLORIDE, DEXTROSE MONOHYDRATE AND SODIUM CHLORIDE 150 ML/HR: 150; 5; 450 INJECTION, SOLUTION INTRAVENOUS at 21:24

## 2024-01-01 RX ADMIN — ATROPINE SULFATE 2 DROP: 10 SOLUTION/ DROPS OPHTHALMIC at 11:25

## 2024-01-01 RX ADMIN — LORAZEPAM 2 MG: 2 SOLUTION, CONCENTRATE ORAL at 04:10

## 2024-01-01 RX ADMIN — ATROPINE SULFATE 2 DROP: 10 SOLUTION/ DROPS OPHTHALMIC at 04:41

## 2024-01-01 RX ADMIN — MAGNESIUM SULFATE HEPTAHYDRATE 2 G: 40 INJECTION, SOLUTION INTRAVENOUS at 10:47

## 2024-01-01 RX ADMIN — NICOTINE 1 PATCH: 14 PATCH, EXTENDED RELEASE TRANSDERMAL at 10:47

## 2024-01-01 RX ADMIN — MORPHINE SULFATE 6 MG: 4 INJECTION, SOLUTION INTRAMUSCULAR; INTRAVENOUS at 14:15

## 2024-01-01 RX ADMIN — NICOTINE 1 PATCH: 14 PATCH, EXTENDED RELEASE TRANSDERMAL at 09:02

## 2024-01-01 RX ADMIN — MORPHINE SULFATE 4 MG: 4 INJECTION, SOLUTION INTRAMUSCULAR; INTRAVENOUS at 09:16

## 2024-01-01 RX ADMIN — HEPARIN SODIUM 5000 UNITS: 5000 INJECTION INTRAVENOUS; SUBCUTANEOUS at 22:16

## 2024-01-01 RX ADMIN — Medication 10 ML: at 21:00

## 2024-01-01 RX ADMIN — MORPHINE SULFATE 10 MG: 10 SOLUTION ORAL at 04:10

## 2024-01-01 RX ADMIN — POTASSIUM CHLORIDE, DEXTROSE MONOHYDRATE AND SODIUM CHLORIDE 150 ML/HR: 150; 5; 900 INJECTION, SOLUTION INTRAVENOUS at 16:26

## 2024-01-01 RX ADMIN — DEXMEDETOMIDINE HYDROCHLORIDE 1.5 MCG/KG/HR: 4 INJECTION, SOLUTION INTRAVENOUS at 04:23

## 2024-01-01 RX ADMIN — MORPHINE SULFATE 1 MG: 2 INJECTION, SOLUTION INTRAMUSCULAR; INTRAVENOUS at 13:19

## 2024-01-01 RX ADMIN — SODIUM BICARBONATE 150 MEQ: 84 INJECTION, SOLUTION INTRAVENOUS at 00:29

## 2024-01-01 RX ADMIN — GLYCOPYRROLATE 0.1 MG: 0.2 INJECTION INTRAMUSCULAR; INTRAVENOUS at 07:51

## 2024-01-01 RX ADMIN — MORPHINE SULFATE 1 MG: 2 INJECTION, SOLUTION INTRAMUSCULAR; INTRAVENOUS at 01:27

## 2024-01-01 RX ADMIN — Medication 10 ML: at 09:02

## 2024-01-01 RX ADMIN — MORPHINE SULFATE 1 MG: 2 INJECTION, SOLUTION INTRAMUSCULAR; INTRAVENOUS at 20:19

## 2024-01-01 RX ADMIN — LORAZEPAM 1 MG: 2 SOLUTION, CONCENTRATE ORAL at 22:28

## 2024-01-01 RX ADMIN — Medication 10 ML: at 07:57

## 2024-01-01 RX ADMIN — MORPHINE SULFATE 4 MG: 4 INJECTION, SOLUTION INTRAMUSCULAR; INTRAVENOUS at 14:44

## 2024-01-01 RX ADMIN — SENNOSIDES AND DOCUSATE SODIUM 2 TABLET: 50; 8.6 TABLET ORAL at 09:01

## 2024-01-01 RX ADMIN — SODIUM BICARBONATE 150 MEQ: 84 INJECTION, SOLUTION INTRAVENOUS at 17:59

## 2024-01-01 RX ADMIN — LORAZEPAM 1 MG: 2 SOLUTION, CONCENTRATE ORAL at 10:49

## 2024-01-01 RX ADMIN — MORPHINE SULFATE 2 MG: 2 INJECTION, SOLUTION INTRAMUSCULAR; INTRAVENOUS at 12:44

## 2024-01-01 RX ADMIN — DEXMEDETOMIDINE HYDROCHLORIDE 1.5 MCG/KG/HR: 4 INJECTION, SOLUTION INTRAVENOUS at 00:28

## 2024-01-01 RX ADMIN — MORPHINE SULFATE 2 MG: 2 INJECTION, SOLUTION INTRAMUSCULAR; INTRAVENOUS at 23:43

## 2024-01-01 RX ADMIN — MORPHINE SULFATE 6 MG: 4 INJECTION, SOLUTION INTRAMUSCULAR; INTRAVENOUS at 18:32

## 2024-01-01 RX ADMIN — ATROPINE SULFATE 2 DROP: 10 SOLUTION/ DROPS OPHTHALMIC at 22:30

## 2024-01-01 RX ADMIN — CEFTRIAXONE SODIUM 2000 MG: 2 INJECTION, POWDER, FOR SOLUTION INTRAMUSCULAR; INTRAVENOUS at 12:59

## 2024-01-01 RX ADMIN — MORPHINE SULFATE 1 MG: 2 INJECTION, SOLUTION INTRAMUSCULAR; INTRAVENOUS at 02:18

## 2024-01-01 RX ADMIN — MAGNESIUM SULFATE HEPTAHYDRATE 2 G: 40 INJECTION, SOLUTION INTRAVENOUS at 12:58

## 2024-01-01 RX ADMIN — MORPHINE SULFATE 1 MG: 2 INJECTION, SOLUTION INTRAMUSCULAR; INTRAVENOUS at 07:56

## 2024-01-01 RX ADMIN — VANCOMYCIN HYDROCHLORIDE 1500 MG: 5 INJECTION, POWDER, LYOPHILIZED, FOR SOLUTION INTRAVENOUS at 15:32

## 2024-01-01 RX ADMIN — HEPARIN SODIUM 5000 UNITS: 5000 INJECTION INTRAVENOUS; SUBCUTANEOUS at 05:47

## 2024-01-01 RX ADMIN — SODIUM CHLORIDE 2175 ML: 9 INJECTION, SOLUTION INTRAVENOUS at 13:00

## 2024-01-01 RX ADMIN — MORPHINE SULFATE 1 MG: 2 INJECTION, SOLUTION INTRAMUSCULAR; INTRAVENOUS at 12:45

## 2024-01-01 RX ADMIN — MORPHINE SULFATE 4 MG: 4 INJECTION, SOLUTION INTRAMUSCULAR; INTRAVENOUS at 10:50

## 2024-01-01 RX ADMIN — MORPHINE SULFATE 1 MG: 2 INJECTION, SOLUTION INTRAMUSCULAR; INTRAVENOUS at 08:03

## 2024-01-01 RX ADMIN — Medication 10 ML: at 09:31

## 2024-01-01 RX ADMIN — FAMOTIDINE 20 MG: 10 INJECTION INTRAVENOUS at 09:02

## 2024-01-01 RX ADMIN — GLYCOPYRROLATE 0.1 MG: 0.2 INJECTION INTRAMUSCULAR; INTRAVENOUS at 06:43

## 2024-01-01 RX ADMIN — INSULIN HUMAN 1.8 UNITS/HR: 1 INJECTION, SOLUTION INTRAVENOUS at 15:24

## 2024-01-01 RX ADMIN — DEXMEDETOMIDINE HYDROCHLORIDE 1.5 MCG/KG/HR: 4 INJECTION, SOLUTION INTRAVENOUS at 15:59

## 2024-01-01 RX ADMIN — LORAZEPAM 2 MG: 2 SOLUTION, CONCENTRATE ORAL at 06:43

## 2024-01-01 RX ADMIN — MORPHINE SULFATE 4 MG: 4 INJECTION, SOLUTION INTRAMUSCULAR; INTRAVENOUS at 22:28

## 2024-01-01 RX ADMIN — LORAZEPAM 2 MG: 2 SOLUTION, CONCENTRATE ORAL at 04:52

## 2024-01-01 RX ADMIN — DEXMEDETOMIDINE HYDROCHLORIDE 1.1 MCG/KG/HR: 4 INJECTION, SOLUTION INTRAVENOUS at 07:57

## 2024-01-01 RX ADMIN — MORPHINE SULFATE 4 MG: 4 INJECTION, SOLUTION INTRAMUSCULAR; INTRAVENOUS at 10:03

## 2024-01-01 RX ADMIN — MIDAZOLAM HYDROCHLORIDE 4 MG: 1 INJECTION, SOLUTION INTRAMUSCULAR; INTRAVENOUS at 12:45

## 2024-01-01 RX ADMIN — Medication 10 ML: at 10:47

## 2024-01-01 RX ADMIN — LORAZEPAM 0.5 MG: 2 SOLUTION, CONCENTRATE ORAL at 18:31

## 2024-01-01 RX ADMIN — DEXMEDETOMIDINE HYDROCHLORIDE 1.5 MCG/KG/HR: 4 INJECTION, SOLUTION INTRAVENOUS at 19:36

## 2024-01-01 RX ADMIN — NICOTINE 1 PATCH: 14 PATCH, EXTENDED RELEASE TRANSDERMAL at 18:09

## 2024-01-01 RX ADMIN — HEPARIN SODIUM 5000 UNITS: 5000 INJECTION INTRAVENOUS; SUBCUTANEOUS at 17:42

## 2024-01-01 RX ADMIN — AZITHROMYCIN MONOHYDRATE 500 MG: 500 INJECTION, POWDER, LYOPHILIZED, FOR SOLUTION INTRAVENOUS at 13:58

## 2024-01-01 RX ADMIN — MORPHINE SULFATE 5 MG: 10 SOLUTION ORAL at 04:53

## 2024-01-01 RX ADMIN — LORAZEPAM 0.5 MG: 2 SOLUTION, CONCENTRATE ORAL at 05:09

## 2024-01-01 RX ADMIN — GLYCOPYRROLATE 0.1 MG: 0.2 INJECTION INTRAMUSCULAR; INTRAVENOUS at 11:38

## 2024-01-01 RX ADMIN — LORAZEPAM 0.5 MG: 2 SOLUTION, CONCENTRATE ORAL at 14:15

## 2024-01-01 RX ADMIN — AMPICILLIN AND SULBACTAM 3 G: 2; 1 INJECTION, POWDER, FOR SOLUTION INTRAVENOUS at 14:56

## 2024-01-01 RX ADMIN — MIDAZOLAM HYDROCHLORIDE 4 MG: 1 INJECTION, SOLUTION INTRAMUSCULAR; INTRAVENOUS at 18:35

## 2024-01-01 RX ADMIN — MIDAZOLAM HYDROCHLORIDE 4 MG: 1 INJECTION, SOLUTION INTRAMUSCULAR; INTRAVENOUS at 14:43

## 2024-01-01 RX ADMIN — MORPHINE SULFATE 10 MG: 10 SOLUTION ORAL at 06:42

## 2024-01-01 RX ADMIN — LORAZEPAM 1 MG: 2 INJECTION, SOLUTION INTRAMUSCULAR; INTRAVENOUS at 12:35

## 2024-01-01 RX ADMIN — SODIUM CHLORIDE 1000 ML/HR: 9 INJECTION, SOLUTION INTRAVENOUS at 14:58

## 2024-01-01 RX ADMIN — MORPHINE SULFATE 4 MG: 4 INJECTION, SOLUTION INTRAMUSCULAR; INTRAVENOUS at 18:35

## 2024-01-01 RX ADMIN — LORAZEPAM 1 MG: 2 SOLUTION, CONCENTRATE ORAL at 09:16

## 2024-01-01 RX ADMIN — MORPHINE SULFATE 1 MG: 2 INJECTION, SOLUTION INTRAMUSCULAR; INTRAVENOUS at 19:30

## 2024-01-01 RX ADMIN — GLYCOPYRROLATE 0.1 MG: 0.2 INJECTION INTRAMUSCULAR; INTRAVENOUS at 23:43

## 2024-01-01 RX ADMIN — DEXMEDETOMIDINE HYDROCHLORIDE 0.2 MCG/KG/HR: 4 INJECTION, SOLUTION INTRAVENOUS at 16:03

## 2024-01-01 RX ADMIN — SODIUM BICARBONATE 150 MEQ: 84 INJECTION, SOLUTION INTRAVENOUS at 07:05

## 2024-01-01 RX ADMIN — FAMOTIDINE 20 MG: 10 INJECTION INTRAVENOUS at 11:59

## 2024-01-01 RX ADMIN — POTASSIUM CHLORIDE, DEXTROSE MONOHYDRATE AND SODIUM CHLORIDE 150 ML/HR: 150; 5; 450 INJECTION, SOLUTION INTRAVENOUS at 04:36

## 2024-01-01 RX ADMIN — MORPHINE SULFATE 2 MG: 2 INJECTION, SOLUTION INTRAMUSCULAR; INTRAVENOUS at 11:24

## 2024-01-01 RX ADMIN — MIDAZOLAM HYDROCHLORIDE 4 MG: 1 INJECTION, SOLUTION INTRAMUSCULAR; INTRAVENOUS at 10:04

## 2024-01-01 RX ADMIN — MORPHINE SULFATE 4 MG: 4 INJECTION, SOLUTION INTRAMUSCULAR; INTRAVENOUS at 20:48

## 2024-01-01 RX ADMIN — NICOTINE 1 PATCH: 14 PATCH, EXTENDED RELEASE TRANSDERMAL at 08:04

## 2024-01-01 RX ADMIN — PIPERACILLIN AND TAZOBACTAM 4.5 G: 4; .5 INJECTION, POWDER, FOR SOLUTION INTRAVENOUS at 04:21

## 2024-01-01 RX ADMIN — MORPHINE SULFATE 4 MG: 4 INJECTION, SOLUTION INTRAMUSCULAR; INTRAVENOUS at 06:34

## 2024-01-01 RX ADMIN — DEXMEDETOMIDINE HYDROCHLORIDE 0.2 MCG/KG/HR: 4 INJECTION, SOLUTION INTRAVENOUS at 16:21

## 2024-01-01 RX ADMIN — SODIUM CHLORIDE 250 MG: 9 INJECTION, SOLUTION INTRAVENOUS at 12:25

## 2024-01-01 RX ADMIN — MIDAZOLAM HYDROCHLORIDE 4 MG: 1 INJECTION, SOLUTION INTRAMUSCULAR; INTRAVENOUS at 07:51

## 2024-01-01 RX ADMIN — MORPHINE SULFATE 4 MG: 4 INJECTION, SOLUTION INTRAMUSCULAR; INTRAVENOUS at 05:03

## 2024-01-01 RX ADMIN — LORAZEPAM 2 MG: 2 SOLUTION, CONCENTRATE ORAL at 22:30

## 2024-01-01 RX ADMIN — Medication 10 ML: at 20:20

## 2024-01-01 RX ADMIN — MIDAZOLAM HYDROCHLORIDE 4 MG: 1 INJECTION, SOLUTION INTRAMUSCULAR; INTRAVENOUS at 11:24

## 2024-01-01 RX ADMIN — MORPHINE SULFATE 2 MG: 2 INJECTION, SOLUTION INTRAMUSCULAR; INTRAVENOUS at 07:51

## 2024-01-01 RX ADMIN — ACETAMINOPHEN 650 MG: 160 SOLUTION ORAL at 11:38

## 2024-01-01 RX ADMIN — GLYCOPYRROLATE 0.1 MG: 0.2 INJECTION INTRAMUSCULAR; INTRAVENOUS at 12:47

## 2024-01-01 RX ADMIN — NICOTINE 1 PATCH: 14 PATCH, EXTENDED RELEASE TRANSDERMAL at 07:56

## 2024-01-01 RX ADMIN — LORAZEPAM 2 MG: 2 SOLUTION, CONCENTRATE ORAL at 06:34

## 2024-01-01 RX ADMIN — MIDAZOLAM HYDROCHLORIDE 4 MG: 1 INJECTION, SOLUTION INTRAMUSCULAR; INTRAVENOUS at 20:48

## 2024-01-01 RX ADMIN — INSULIN HUMAN 2.1 UNITS/HR: 1 INJECTION, SOLUTION INTRAVENOUS at 08:44

## 2024-01-01 RX ADMIN — MORPHINE SULFATE 2 MG: 2 INJECTION, SOLUTION INTRAMUSCULAR; INTRAVENOUS at 20:39

## 2024-01-21 NOTE — PROGRESS NOTES
[facility]  Cardiology progress Note    Patient Name: Rogelio Dee  : 1970    CHIEF COMPLAINT  Hypertension        Subjective   Subjective     HISTORY OF PRESENT ILLNESS    Rogelio Dee is a 53 y.o. male with history of hypertension and palpitations.  No further palpitations.    REVIEW OF SYSTEMS    Constitutional:    No fever, no weight loss  Skin:     No rash  Otolaryngeal:    No difficulty swallowing  Cardiovascular: See HPI.  Pulmonary:    No cough, no sputum production    Personal History     Social History:    reports that he has been smoking cigarettes. He has a 15.00 pack-year smoking history. He has been exposed to tobacco smoke. He has never used smokeless tobacco. He reports that he does not drink alcohol and does not use drugs.    Home Medications:  Current Outpatient Medications on File Prior to Visit   Medication Sig    Admelog 100 UNIT/ML injection ADMINISTER 15 UNITS UNDER THE SKIN BEFORE A MEAL AS NEEDED PER SLIDING SCALE    amLODIPine (NORVASC) 10 MG tablet amlodipine 10 mg tablet    aspirin 81 MG EC tablet TAKE 1 TABLET BY MOUTH EVERY DAY    atorvastatin (LIPITOR) 20 MG tablet TAKE 1 TABLET BY MOUTH DAILY    carvedilol (COREG) 25 MG tablet TAKE 1 TABLET BY MOUTH TWICE DAILY WITH MEALS    cetirizine (zyrTEC) 5 MG tablet Take 1 tablet by mouth Daily.    Cholecalciferol 50 MCG (2000 UT) capsule Take 1 capsule by mouth Daily.    citalopram (CeleXA) 40 MG tablet TAKE 1 TABLET BY MOUTH DAILY    cloNIDine (CATAPRES) 0.1 MG tablet TAKE 2 TABLETS BY MOUTH TWICE DAILY    cyclobenzaprine (FLEXERIL) 10 MG tablet Every 8 (Eight) Hours.    dorzolamide-timolol (COSOPT) 22.3-6.8 MG/ML ophthalmic solution Administer 1 drop to both eyes 2 (Two) Times a Day.    ferrous sulfate 325 (65 FE) MG tablet FeroSul 325 mg (65 mg iron) tablet   TAKE 1 TABLET BY MOUTH TWICE DAILY    furosemide (LASIX) 40 MG tablet TAKE 1 TABLET BY MOUTH DAILY    hydrALAZINE (APRESOLINE) 100 MG tablet TAKE 1  TABLET BY MOUTH THREE TIMES DAILY    HYDROcodone-acetaminophen (NORCO) 7.5-325 MG per tablet Every 6 (Six) Hours.    insulin degludec (TRESIBA FLEXTOUCH) 100 UNIT/ML solution pen-injector injection Inject 20 Units under the skin into the appropriate area as directed Daily.    Insulin Lispro, 1 Unit Dial, (HUMALOG) 100 UNIT/ML solution pen-injector Inject 8 Units under the skin into the appropriate area as directed Daily With Breakfast, Lunch & Dinner.    NIFEdipine XL (PROCARDIA XL) 30 MG 24 hr tablet TAKE 2 TABLETS BY MOUTH EVERY DAY    omeprazole (priLOSEC) 40 MG capsule TAKE 1 CAPSULE BY MOUTH DAILY    rOPINIRole (REQUIP) 0.25 MG tablet TAKE 1 TABLET BY MOUTH EVERY NIGHT 1 HOUR BEFORE BEDTIME    sodium bicarbonate 650 MG tablet Take 1 tablet by mouth 3 (Three) Times a Day.    sodium polystyrene (KAYEXALATE) 15 GM/60ML suspension     tamsulosin (FLOMAX) 0.4 MG capsule 24 hr capsule TAKE 1 CAPSULE BY MOUTH DAILY     No current facility-administered medications on file prior to visit.       Past Medical History:   Diagnosis Date    Blindness of left eye     Chronic diastolic CHF 07/15/2021    Chronic pain syndrome     Diabetes mellitus, type 2     Diabetic neuropathy 07/15/2021    Hyperlipidemia     Hypertension     Lumbosacral disc herniation     L5-S1    Metabolic acidosis     Smoker 07/15/2021    Stage 4 CKD 07/15/2021    Status post amputation of great toe, left     Status post amputation of great toe, right     Type 2 DM with proliferative retinopathy with macular edema, bilateral 03/24/2021    PDR OD - New VH since last visit.  Missed appt in [place] for PRP, partially because of ride issues and partially because he is hesitant to undergo PRP because it is uncomfortable.  Avastin today.  Discussed retrobulbar for PRP vs. Treatment with anti-VEGF exclusively.  Pt will consider options.  If doing PRP, will try to get done in 1 session in order to minimize trips to [place].  PDR OS -        Allergies:  Allergies   Allergen Reactions    Oxycodone Mental Status Change and Other (See Comments)       Objective    Objective       Vitals:   Heart Rate:  [81] 81  BP: (144)/(67) 144/67  Body mass index is 24.63 kg/m².     PHYSICAL EXAM:    General Appearance:   well developed  well nourished  HENT:   oropharynx moist  lips not cyanotic  Neck:  thyroid not enlarged  supple  Respiratory:  no respiratory distress  normal breath sounds  no rales  Cardiovascular:  no jugular venous distention  regular rhythm  apical impulse normal  S1 normal, S2 normal  no S3, no S4   no murmur  no rub, no thrill  carotid pulses normal; no bruit  pedal pulses normal  lower extremity edema: none    Skin:   warm, dry  Psychiatric:  judgement and insight appropriate  normal mood and affect        Result Review:  I have personally reviewed the available results from  [x]  Laboratory  [x]  EKG  [x]  Cardiology  [x]  Medications  [x]  Old records  []  Other:     Procedures  Lab Results   Component Value Date    CHOL 85 11/06/2023    CHOL 120 04/28/2023    CHOL 133 04/21/2022     Lab Results   Component Value Date    TRIG 166 (H) 11/06/2023    TRIG 164 (H) 04/28/2023    TRIG 195 (H) 04/21/2022     Lab Results   Component Value Date    HDL 22 (L) 11/06/2023    HDL 26 (L) 04/28/2023    HDL 30 (L) 04/21/2022     Lab Results   Component Value Date    LDL 35 11/06/2023    LDL 66 04/28/2023    LDL 70 04/21/2022     Lab Results   Component Value Date    VLDL 28 11/06/2023    VLDL 28 04/28/2023    VLDL 33 04/21/2022        Impression/Plan:  1.  Essential hypertension controlled: Continue amlodipine 10 mg once a day.  Continue clonidine 0.2mg twice a day.    Continue hydralazine 100 mg 3 times a day.  Monitor blood pressure regularly.  2.  Positive nicotine use: Smoking cessation discussed with patient.  3.  Palpitations/sinus tachycardia: Continue carvedilol 25 mg twice a day.  4.  Hyperlipidemia: Continue Lipitor 20 mg once a day.  Monitor  lipid and hepatic profile.           Javid White MD   01/30/24   14:07 EST

## 2024-01-26 DIAGNOSIS — I10 ESSENTIAL HYPERTENSION: ICD-10-CM

## 2024-01-26 RX ORDER — CLONIDINE HYDROCHLORIDE 0.1 MG/1
0.2 TABLET ORAL 2 TIMES DAILY
Qty: 360 TABLET | Refills: 3 | Status: SHIPPED | OUTPATIENT
Start: 2024-01-26

## 2024-01-30 ENCOUNTER — OFFICE VISIT (OUTPATIENT)
Dept: CARDIOLOGY | Facility: CLINIC | Age: 54
End: 2024-01-30
Payer: COMMERCIAL

## 2024-01-30 ENCOUNTER — PATIENT OUTREACH (OUTPATIENT)
Dept: CASE MANAGEMENT | Facility: OTHER | Age: 54
End: 2024-01-30
Payer: COMMERCIAL

## 2024-01-30 VITALS
SYSTOLIC BLOOD PRESSURE: 144 MMHG | BODY MASS INDEX: 24.55 KG/M2 | HEIGHT: 68 IN | DIASTOLIC BLOOD PRESSURE: 67 MMHG | HEART RATE: 81 BPM | WEIGHT: 162 LBS

## 2024-01-30 DIAGNOSIS — Z79.4 TYPE 2 DIABETES MELLITUS WITH BOTH EYES AFFECTED BY PROLIFERATIVE RETINOPATHY AND MACULAR EDEMA, WITH LONG-TERM CURRENT USE OF INSULIN: Primary | ICD-10-CM

## 2024-01-30 DIAGNOSIS — Z72.0 NICOTINE USE: ICD-10-CM

## 2024-01-30 DIAGNOSIS — E78.2 HYPERLIPEMIA, MIXED: ICD-10-CM

## 2024-01-30 DIAGNOSIS — I50.32 CHRONIC DIASTOLIC (CONGESTIVE) HEART FAILURE: ICD-10-CM

## 2024-01-30 DIAGNOSIS — I10 HYPERTENSION, ESSENTIAL: Primary | ICD-10-CM

## 2024-01-30 DIAGNOSIS — I10 ESSENTIAL HYPERTENSION: ICD-10-CM

## 2024-01-30 DIAGNOSIS — E11.3513 TYPE 2 DIABETES MELLITUS WITH BOTH EYES AFFECTED BY PROLIFERATIVE RETINOPATHY AND MACULAR EDEMA, WITH LONG-TERM CURRENT USE OF INSULIN: Primary | ICD-10-CM

## 2024-01-30 DIAGNOSIS — R00.2 PALPITATIONS: ICD-10-CM

## 2024-01-30 PROCEDURE — 3078F DIAST BP <80 MM HG: CPT | Performed by: SPECIALIST

## 2024-01-30 PROCEDURE — 3077F SYST BP >= 140 MM HG: CPT | Performed by: SPECIALIST

## 2024-01-30 PROCEDURE — 99214 OFFICE O/P EST MOD 30 MIN: CPT | Performed by: SPECIALIST

## 2024-01-30 NOTE — OUTREACH NOTE
AMBULATORY CASE MANAGEMENT NOTE    Name and Relationship of Patient/Support Person: Rogelio Dee S - Self    CCM Interim Update      Care Coordination      CM reached out to the patient . He stated that he was doing well today . Patient stated no needs and said that all meds had just been refilled . CM discussed the idea of stopping the CCM program but stated that he would like to remain for a short period longer he stated he still needed support . CM stated that would be ok . Chart reviewed , meds reviewed, patient stated no further needs at this time.All future appts reviewed . All questions were answered and contact information provided . Future outreach scheduled .          Education Documentation  No documentation found.        JULES HUIZAR  Ambulatory Case Management    1/30/2024, 15:11 EST

## 2024-02-07 ENCOUNTER — OFFICE VISIT (OUTPATIENT)
Dept: INTERNAL MEDICINE | Facility: CLINIC | Age: 54
End: 2024-02-07
Payer: COMMERCIAL

## 2024-02-07 VITALS
DIASTOLIC BLOOD PRESSURE: 90 MMHG | HEART RATE: 116 BPM | TEMPERATURE: 98.7 F | BODY MASS INDEX: 24.4 KG/M2 | WEIGHT: 161 LBS | SYSTOLIC BLOOD PRESSURE: 182 MMHG | OXYGEN SATURATION: 95 % | HEIGHT: 68 IN

## 2024-02-07 DIAGNOSIS — N18.4 STAGE 4 CHRONIC KIDNEY DISEASE: ICD-10-CM

## 2024-02-07 DIAGNOSIS — E11.3513 TYPE 2 DIABETES MELLITUS WITH BOTH EYES AFFECTED BY PROLIFERATIVE RETINOPATHY AND MACULAR EDEMA, WITH LONG-TERM CURRENT USE OF INSULIN: ICD-10-CM

## 2024-02-07 DIAGNOSIS — I10 ESSENTIAL HYPERTENSION: Primary | ICD-10-CM

## 2024-02-07 DIAGNOSIS — Z79.4 TYPE 2 DIABETES MELLITUS WITH BOTH EYES AFFECTED BY PROLIFERATIVE RETINOPATHY AND MACULAR EDEMA, WITH LONG-TERM CURRENT USE OF INSULIN: ICD-10-CM

## 2024-02-07 DIAGNOSIS — E87.20 METABOLIC ACIDOSIS: ICD-10-CM

## 2024-02-07 LAB
ALBUMIN SERPL-MCNC: 3.9 G/DL (ref 3.5–5.2)
ALBUMIN/GLOB SERPL: 1.3 G/DL
ALP SERPL-CCNC: 105 U/L (ref 39–117)
ALT SERPL W P-5'-P-CCNC: 5 U/L (ref 1–41)
ANION GAP SERPL CALCULATED.3IONS-SCNC: 13 MMOL/L (ref 5–15)
AST SERPL-CCNC: 6 U/L (ref 1–40)
BASOPHILS # BLD AUTO: 0.12 10*3/MM3 (ref 0–0.2)
BASOPHILS NFR BLD AUTO: 1.2 % (ref 0–1.5)
BILIRUB SERPL-MCNC: 0.2 MG/DL (ref 0–1.2)
BUN SERPL-MCNC: 55 MG/DL (ref 6–20)
BUN/CREAT SERPL: 13.9 (ref 7–25)
CALCIUM SPEC-SCNC: 9.4 MG/DL (ref 8.6–10.5)
CHLORIDE SERPL-SCNC: 103 MMOL/L (ref 98–107)
CO2 SERPL-SCNC: 20 MMOL/L (ref 22–29)
CREAT SERPL-MCNC: 3.96 MG/DL (ref 0.76–1.27)
DEPRECATED RDW RBC AUTO: 45.1 FL (ref 37–54)
EGFRCR SERPLBLD CKD-EPI 2021: 17.4 ML/MIN/1.73
EOSINOPHIL # BLD AUTO: 0.29 10*3/MM3 (ref 0–0.4)
EOSINOPHIL NFR BLD AUTO: 2.9 % (ref 0.3–6.2)
ERYTHROCYTE [DISTWIDTH] IN BLOOD BY AUTOMATED COUNT: 13.2 % (ref 12.3–15.4)
GLOBULIN UR ELPH-MCNC: 3.1 GM/DL
GLUCOSE SERPL-MCNC: 254 MG/DL (ref 65–99)
HBA1C MFR BLD: 7.4 % (ref 4.8–5.6)
HCT VFR BLD AUTO: 30.9 % (ref 37.5–51)
HGB BLD-MCNC: 10 G/DL (ref 13–17.7)
IMM GRANULOCYTES # BLD AUTO: 0.05 10*3/MM3 (ref 0–0.05)
IMM GRANULOCYTES NFR BLD AUTO: 0.5 % (ref 0–0.5)
LYMPHOCYTES # BLD AUTO: 1.79 10*3/MM3 (ref 0.7–3.1)
LYMPHOCYTES NFR BLD AUTO: 17.8 % (ref 19.6–45.3)
MCH RBC QN AUTO: 29.9 PG (ref 26.6–33)
MCHC RBC AUTO-ENTMCNC: 32.4 G/DL (ref 31.5–35.7)
MCV RBC AUTO: 92.2 FL (ref 79–97)
MONOCYTES # BLD AUTO: 0.69 10*3/MM3 (ref 0.1–0.9)
MONOCYTES NFR BLD AUTO: 6.9 % (ref 5–12)
NEUTROPHILS NFR BLD AUTO: 7.09 10*3/MM3 (ref 1.7–7)
NEUTROPHILS NFR BLD AUTO: 70.7 % (ref 42.7–76)
NRBC BLD AUTO-RTO: 0 /100 WBC (ref 0–0.2)
PLATELET # BLD AUTO: 417 10*3/MM3 (ref 140–450)
PMV BLD AUTO: 11.3 FL (ref 6–12)
POTASSIUM SERPL-SCNC: 4 MMOL/L (ref 3.5–5.2)
PROT SERPL-MCNC: 7 G/DL (ref 6–8.5)
RBC # BLD AUTO: 3.35 10*6/MM3 (ref 4.14–5.8)
SODIUM SERPL-SCNC: 136 MMOL/L (ref 136–145)
WBC NRBC COR # BLD AUTO: 10.03 10*3/MM3 (ref 3.4–10.8)

## 2024-02-07 PROCEDURE — 83036 HEMOGLOBIN GLYCOSYLATED A1C: CPT | Performed by: STUDENT IN AN ORGANIZED HEALTH CARE EDUCATION/TRAINING PROGRAM

## 2024-02-07 PROCEDURE — 85025 COMPLETE CBC W/AUTO DIFF WBC: CPT | Performed by: STUDENT IN AN ORGANIZED HEALTH CARE EDUCATION/TRAINING PROGRAM

## 2024-02-07 PROCEDURE — 80053 COMPREHEN METABOLIC PANEL: CPT | Performed by: STUDENT IN AN ORGANIZED HEALTH CARE EDUCATION/TRAINING PROGRAM

## 2024-02-07 RX ORDER — LANCETS 28 GAUGE
EACH MISCELLANEOUS
Qty: 100 EACH | Refills: 12 | Status: SHIPPED | OUTPATIENT
Start: 2024-02-07

## 2024-02-07 RX ORDER — BLOOD SUGAR DIAGNOSTIC
1 STRIP MISCELLANEOUS
Qty: 1 EACH | Refills: 0 | Status: SHIPPED | OUTPATIENT
Start: 2024-02-07

## 2024-02-07 RX ORDER — CLONIDINE HYDROCHLORIDE 0.1 MG/1
0.2 TABLET ORAL 2 TIMES DAILY
Qty: 360 TABLET | Refills: 3 | Status: SHIPPED | OUTPATIENT
Start: 2024-02-07
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
## 2024-02-09 ENCOUNTER — TELEPHONE (OUTPATIENT)
Dept: INTERNAL MEDICINE | Facility: CLINIC | Age: 54
End: 2024-02-09
Payer: COMMERCIAL

## 2024-02-09 NOTE — TELEPHONE ENCOUNTER
----- Message from Simeon Lomeli MD sent at 2/8/2024  5:44 PM EST -----  CMP shows blood sugar that is quite high at 254.  Renal function is similar to previous measurements, with a filtration rate of 17.4.  Once again, I strongly recommend that you call your nephrologist to schedule follow up.    A1c is 7.4%.  Our goal is less than 7%.  I have placed a new referral with Ira Larson as we discussed in clinic.    CBC shows stable anemia.

## 2024-02-09 NOTE — TELEPHONE ENCOUNTER
Attempted to reach patient, left vm.     OKAY FOR HUB TO RELAY:   CMP shows blood sugar that is quite high at 254.  Renal function is similar to previous measurements, with a filtration rate of 17.4.  Once again, I strongly recommend that you call your nephrologist to schedule follow up.     A1c is 7.4%.  Our goal is less than 7%.  I have placed a new referral with Ira Larson as we discussed in clinic.     CBC shows stable anemia.

## 2024-02-09 NOTE — TELEPHONE ENCOUNTER
Name: Rogelio Dee    Relationship: Self    Best Callback Number: [phone]    HUB PROVIDED THE RELAY MESSAGE FROM THE OFFICE   PATIENT VOICED UNDERSTANDING AND HAS NO FURTHER QUESTIONS AT THIS TIME    ADDITIONAL INFORMATION:

## 2024-02-20 ENCOUNTER — TELEPHONE (OUTPATIENT)
Dept: CASE MANAGEMENT | Facility: OTHER | Age: 54
End: 2024-02-20
Payer: COMMERCIAL

## 2024-02-21 ENCOUNTER — TELEPHONE (OUTPATIENT)
Dept: CASE MANAGEMENT | Facility: OTHER | Age: 54
End: 2024-02-21
Payer: COMMERCIAL

## 2024-02-21 NOTE — TELEPHONE ENCOUNTER
Attempted to reach for CCM call to discuss health care goals and review medications and do a new assessment with taking over care from Chris in case management who has retired. Left message for return call to my work cell.

## 2024-03-08 RX ORDER — DORZOLAMIDE HYDROCHLORIDE AND TIMOLOL MALEATE 20; 5 MG/ML; MG/ML
1 SOLUTION/ DROPS OPHTHALMIC 2 TIMES DAILY
Qty: 10 ML | Refills: 3 | OUTPATIENT
Start: 2024-03-08

## 2024-03-08 RX ORDER — DORZOLAMIDE HYDROCHLORIDE AND TIMOLOL MALEATE 20; 5 MG/ML; MG/ML
1 SOLUTION/ DROPS OPHTHALMIC 2 TIMES DAILY
Qty: 10 ML | Refills: 12 | OUTPATIENT
Start: 2024-03-08

## 2024-03-08 RX ORDER — DORZOLAMIDE HYDROCHLORIDE AND TIMOLOL MALEATE 20; 5 MG/ML; MG/ML
1 SOLUTION/ DROPS OPHTHALMIC 2 TIMES DAILY
Qty: 10 ML | Refills: 12 | Status: SHIPPED | OUTPATIENT
Start: 2024-03-08

## 2024-03-18 ENCOUNTER — TELEPHONE (OUTPATIENT)
Dept: CASE MANAGEMENT | Facility: OTHER | Age: 54
End: 2024-03-18
Payer: COMMERCIAL

## 2024-03-18 ENCOUNTER — PATIENT OUTREACH (OUTPATIENT)
Dept: CASE MANAGEMENT | Facility: OTHER | Age: 54
End: 2024-03-18
Payer: COMMERCIAL

## 2024-03-18 DIAGNOSIS — E11.3513 TYPE 2 DIABETES MELLITUS WITH BOTH EYES AFFECTED BY PROLIFERATIVE RETINOPATHY AND MACULAR EDEMA, WITH LONG-TERM CURRENT USE OF INSULIN: Primary | ICD-10-CM

## 2024-03-18 DIAGNOSIS — E87.20 METABOLIC ACIDOSIS: ICD-10-CM

## 2024-03-18 DIAGNOSIS — E13.49 OTHER DIABETIC NEUROLOGICAL COMPLICATION ASSOCIATED WITH OTHER SPECIFIED DIABETES MELLITUS: ICD-10-CM

## 2024-03-18 DIAGNOSIS — H54.40 BLINDNESS OF LEFT EYE WITH NORMAL VISION IN CONTRALATERAL EYE: ICD-10-CM

## 2024-03-18 DIAGNOSIS — I50.32 CHRONIC DIASTOLIC (CONGESTIVE) HEART FAILURE: ICD-10-CM

## 2024-03-18 DIAGNOSIS — F33.1 MODERATE EPISODE OF RECURRENT MAJOR DEPRESSIVE DISORDER: ICD-10-CM

## 2024-03-18 DIAGNOSIS — Z79.4 TYPE 2 DIABETES MELLITUS WITH BOTH EYES AFFECTED BY PROLIFERATIVE RETINOPATHY AND MACULAR EDEMA, WITH LONG-TERM CURRENT USE OF INSULIN: Primary | ICD-10-CM

## 2024-03-18 DIAGNOSIS — N18.4 STAGE 4 CHRONIC KIDNEY DISEASE: ICD-10-CM

## 2024-03-18 DIAGNOSIS — I10 ESSENTIAL HYPERTENSION: ICD-10-CM

## 2024-03-18 PROBLEM — Z87.828 HISTORY OF MOTOR VEHICLE ACCIDENT: Status: ACTIVE | Noted: 2024-03-18

## 2024-03-18 RX ORDER — SODIUM BICARBONATE 650 MG/1
650 TABLET ORAL 3 TIMES DAILY
Qty: 270 TABLET | Refills: 2 | Status: SHIPPED | OUTPATIENT
Start: 2024-03-18

## 2024-03-18 NOTE — TELEPHONE ENCOUNTER
First contact since taking over for Chris: Patient reports saw eye doc 3 months ago but no records for them since 2021. ([facility] in [place]) I will pend new referrals for DM eye exam, DM foot exam for podiatry, and a new Behavioral Health referral as all are closed out from unable to reach patient.      Called his mother to clarify where they took for eye exam as patient reports they are the drivers for him and her nor his dad remember him going to eye doctor this year.     He also needs refil pended to you sent in.    TY

## 2024-03-18 NOTE — OUTREACH NOTE
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 Care plans reviewed, complete and new starts done with plan of care sent to PCP. New assessment done today. Plan is to get referrals for foot exam, eye exam, refills and follow up calls from after his speciality appointments.     Adult Patient Profile  Questions/Answers      Flowsheet Row Most Recent Value   Symptoms/Conditions Managed at Home behavioral health, diabetes, type 2, HEENT (head, eyes, ears, nose, throat), genitourinary   Barriers to Managing Health lack of transportation, financial resources   Genitourinary Symptoms/Conditions chronic kidney disease   Genitourinary Management Strategies coping strategies, medication therapy   Genitourinary Self-Management Outcome 2 (bad)   Genitourinary Comment referral to nephrology and is aware of his appointment   HEENT Symptoms/Conditions vision problem(s)   Vision Problems blindness/vision loss  [left eye blindness and issues with right eye recently (but vision has returned)]   HEENT Management Strategies medication therapy, routine screening   HEENT Self-Management Outcome 2 (bad)   HEENT Comment encouraged frequesnt eye doctor follow up and he reports he was seen at [facility] in [place] 3 o 4 months ago.   Behavioral Health Symptoms/Conditions depression   Behavioral Management Strategies other (see comments)   Behavioral Health Self-Management Outcome 2 (bad)   Behavioral Health Comment reports unable to get appointment for [facility] even though he is filling out paperwork on line and sending it in, no one gives him an appointment. Agrees to referral for [facility] with ALONDRA Watts at [place] office.   Identifying Life Goals to  not loose vision in right eye and go totally blind   Identifying Health Goals keep illness under control, learning to care for self, managing stress, anxiety or depression            Care Coordination    Call to [facility] in [place] and edmund Felix in medical records [phone] she reports patient has not been seen since 4/16/2021. I clarified [facility] records this is the [place] office, she reports office is correct but no visits since 2021.         Education Documentation  medication management, taught by Tami Granados, RN at 3/18/2024  4:09 PM.  Learner: Patient  Readiness: Acceptance  Method: Explanation  Response: Needs Reinforcement    preventive care, taught by Tami Granados, RN at 3/18/2024  4:09 PM.  Learner: Patient  Readiness: Acceptance  Method: Explanation  Response: Needs Reinforcement    Suicidal Feelings: How to Help Yourself, taught by Tami Granados, RN at 3/18/2024  4:09 PM.  Learner: Patient  Readiness: Acceptance  Method: Explanation  Response: Needs Reinforcement    Living with Depression, taught by Tami Granados, RN at 3/18/2024  4:09 PM.  Learner: Patient  Readiness: Acceptance  Method: Explanation  Response: Needs Reinforcement    Blood Glucose Monitoring, taught by Tami Granados, RN at 3/18/2024  4:09 PM.  Learner: Patient  Readiness: Acceptance  Method: Explanation  Response: Needs Reinforcement    Diabetes, Type 2, taught by Tami Granados, RN at 3/18/2024  4:09 PM.  Learner: Patient  Readiness: Acceptance  Method: Explanation  Response: Needs Reinforcement          Tami FLEMING  Ambulatory Case Management    3/18/2024, 15:59 EDT

## 2024-03-27 ENCOUNTER — TELEPHONE (OUTPATIENT)
Dept: CASE MANAGEMENT | Facility: OTHER | Age: 54
End: 2024-03-27
Payer: COMMERCIAL

## 2024-03-27 NOTE — TELEPHONE ENCOUNTER
Per pended referrals to PCP he is currently scheduled 4/29/24 with [facility] eye surgery [facility] and has a new patient appointment with [facility] for behavioral health for 5/9/24. Will place outreach task for all appointment reminders for all upcoming speciality appointments..

## 2024-03-28 NOTE — PROGRESS NOTES
Chief Complaint  Diabetes    Referred By: Simeon Lomeli MD    Subjective          Rogelio Dee presents to [facility] for diabetes medication management    History of Present Illness    Visit type:  to establish care  Diabetes type:  Type 2  Age at time of dx/Year of dx/Number of years:  20 years or more  Family History of Diabetes: mother and paternal grandmother  Current diabetes status/concerns/issues:  He is accompanied by his mother today.  She states they were suspicious for diabetes when he was a teenager but could not get a doctor to test him at the time.  He has been referred to our office by his PCP for assistance with managing his diabetes.  He reports he was having problems with having lows.  He has been without the fast acting insulin for 4-5 months.  When he ran of the fast acting he started taking the Tresiba before his meals and he developed hypoglycemia.  He stopped this insulin and this has eliminated the hypoglycemia issues.    Other current health concerns: He has some depression  Current Diabetes symptoms:    Polyuria: Yes he believes this is due to using Flomax   Polydipsia: No   Polyphagia: No   Blurred vision: No   Excessive fatigue: Yes sleeping a lot  Known Diabetes complications:  Neuropathy: Numbness, Tingling, Pain, and Burning; Location: Lower Extremities; from the knees down  Renal: Stage IV severe (GFR = 15-29 mL/min)  Eyes: No current eye exam available in record, Blindness, and Other: he reports having a history of retinopathy ; Location:  blind in his left eye due to retinopathy ; Last Eye Exam:  3 years ago ; Location: [facility]  Amputation/Wounds:  He has had amputation of both great toes due to ulcers leading to gangrene  GI: Reflux  Cardiovascular: Hypertension, Hyperlipidemia, CAD, MI (History of), and CVA (History of)  ED: Patient Reported  Other: None  Hospitalizations/ED/911 secondary to DM?  No  Hypoglycemia:  Level 2 (less  than 54 mg/dL); Frequency - he reports previously having glucose levels in the 40s-50s  Hypoglycemia Symptoms:  shaking/tremors and weakness  Current Diabetes treatment:  He has not been taking any diabetes medications over the last 2 months or so  Prior diabetes treatments:  Lispro, Januvia (stopped due to kidney concerns)  Using ACEI or ARB: No  Using Statin: Yes, atorvastatin, Managed by other provider  Blood glucose device:  Meter  Blood glucose monitoring frequency:  2 -3  Blood glucose range/average:   he typically test after his meals; 100-200  Glucose Source: Patient Reported  Dietary behavior:  Limits high carb/sweet foods, Avoids sugary drinks, Number of meals each day - 2; Number of snacks each day - 1  Activity/Exercise:  None  Last Foot Exam:  he sees a podiatrist  Diabetes Education Hx: None  Social Determinants of Health: Financial concerns; he is on disability, Food insecurities; he has tried to apply for food stamps but has had computer issues, and Transportation; he does not have a current 's license    Past Medical History:   Diagnosis Date    Blindness of left eye     Chronic diastolic CHF 07/15/2021    Chronic pain syndrome     Diabetes mellitus, type 2     Diabetic neuropathy 07/15/2021    Hyperlipidemia     Hypertension     Lumbosacral disc herniation     L5-S1    Metabolic acidosis     Smoker 07/15/2021    Stage 4 CKD 07/15/2021    Status post amputation of great toe, left     Status post amputation of great toe, right     Type 2 DM with proliferative retinopathy with macular edema, bilateral 03/24/2021    PDR OD - [facility] since last visit.  Missed appt in [place] for PRP, partially because of ride issues and partially because he is hesitant to undergo PRP because it is uncomfortable.  Avastin today.  Discussed retrobulbar for PRP vs. Treatment with anti-VEGF exclusively.  Pt will consider options.  If doing PRP, will try to get done in 1 session in order to minimize trips to  "[place].  PDR OS -     Past Surgical History:   Procedure Laterality Date    LUMBAR DISC SURGERY      TOE AMPUTATION       Family History   Problem Relation Age of Onset    Diabetes Mother     Diabetes Paternal Grandmother      Social History     Socioeconomic History    Marital status:    Tobacco Use    Smoking status: Every Day     Current packs/day: 0.50     Average packs/day: 0.5 packs/day for 30.0 years (15.0 ttl pk-yrs)     Types: Cigarettes     Passive exposure: Current    Smokeless tobacco: Never    Tobacco comments:     10 cigs a day   Vaping Use    Vaping status: Former   Substance and Sexual Activity    Alcohol use: Never    Drug use: Never    Sexual activity: Defer     Allergies   Allergen Reactions    Kayexalate [Sodium Polystyrene Sulfonate] GI Intolerance     \"Can not make it to the bathroom fast enough\"    Oxycodone Mental Status Change and Other (See Comments)       Current Outpatient Medications:     amLODIPine (NORVASC) 10 MG tablet, amlodipine 10 mg tablet, Disp: , Rfl:     aspirin 81 MG EC tablet, TAKE 1 TABLET BY MOUTH EVERY DAY, Disp: 90 tablet, Rfl: 1    atorvastatin (LIPITOR) 20 MG tablet, TAKE 1 TABLET BY MOUTH DAILY, Disp: 90 tablet, Rfl: 2    Blood Glucose Monitoring Suppl (Advocate Blood Glucose Monitor) device, Use 1 Units 4 (Four) Times a Day Before Meals & at Bedtime., Disp: 1 each, Rfl: 0    Blood Glucose Monitoring Suppl (FreeStyle Lite) w/Device kit, by Other route 4 (Four) Times a Day. use to test blood sugar, Disp: , Rfl:     carvedilol (COREG) 25 MG tablet, TAKE 1 TABLET BY MOUTH TWICE DAILY WITH MEALS, Disp: 180 tablet, Rfl: 2    cetirizine (zyrTEC) 5 MG tablet, Take 1 tablet by mouth Daily., Disp: 90 tablet, Rfl: 3    Cholecalciferol 50 MCG (2000 UT) capsule, Take 1 capsule by mouth Daily., Disp: 90 each, Rfl: 3    citalopram (CeleXA) 40 MG tablet, TAKE 1 TABLET BY MOUTH DAILY, Disp: 90 tablet, Rfl: 2    cloNIDine (CATAPRES) 0.1 MG tablet, Take 2 tablets by mouth 2 " "(Two) Times a Day., Disp: 360 tablet, Rfl: 3    cyclobenzaprine (FLEXERIL) 10 MG tablet, Every 8 (Eight) Hours., Disp: , Rfl:     dorzolamide-timolol (Cosopt) 2-0.5 % ophthalmic solution, Administer 1 drop to both eyes 2 (Two) Times a Day., Disp: 10 mL, Rfl: 12    ferrous sulfate 325 (65 FE) MG tablet, FeroSul 325 mg (65 mg iron) tablet  TAKE 1 TABLET BY MOUTH TWICE DAILY, Disp: , Rfl:     furosemide (LASIX) 40 MG tablet, TAKE 1 TABLET BY MOUTH DAILY, Disp: 90 tablet, Rfl: 2    glucose blood test strip, Use as instructed, Disp: 100 each, Rfl: 12    hydrALAZINE (APRESOLINE) 100 MG tablet, TAKE 1 TABLET BY MOUTH THREE TIMES DAILY, Disp: 270 tablet, Rfl: 2    HYDROcodone-acetaminophen (NORCO) 7.5-325 MG per tablet, Every 6 (Six) Hours., Disp: , Rfl:     Lancets (freestyle) lancets, Check blood sugar every morning (AM fasting) as well as before lunch and dinner and before bedtime, Disp: 100 each, Rfl: 12    NIFEdipine XL (PROCARDIA XL) 30 MG 24 hr tablet, Take 2 tablets by mouth Daily., Disp: , Rfl:     omeprazole (priLOSEC) 40 MG capsule, TAKE 1 CAPSULE BY MOUTH DAILY, Disp: 90 capsule, Rfl: 2    rOPINIRole (REQUIP) 0.25 MG tablet, TAKE 1 TABLET BY MOUTH EVERY NIGHT 1 HOUR BEFORE BEDTIME, Disp: 90 tablet, Rfl: 2    sodium bicarbonate 650 MG tablet, Take 1 tablet by mouth 3 (Three) Times a Day., Disp: 270 tablet, Rfl: 2    tamsulosin (FLOMAX) 0.4 MG capsule 24 hr capsule, TAKE 1 CAPSULE BY MOUTH DAILY, Disp: 90 capsule, Rfl: 2    Continuous Blood Gluc Sensor (FreeStyle Shawna 3 Sensor) misc, Use 1 each Every 14 (Fourteen) Days., Disp: 2 each, Rfl: 5    Objective     Vitals:    04/02/24 1541   BP: 178/71   BP Location: Right arm   Patient Position: Sitting   Cuff Size: Adult   Pulse: 73   SpO2: 98%   Weight: 76.2 kg (168 lb)   Height: 172.7 cm (68\")   PainSc:   8     Body mass index is 25.54 kg/m².    Physical Exam  Constitutional:       Appearance: Normal appearance.      Comments: Overweight (BMI 25 - 29.9) Pt Current " BMI = 25.54    HENT:      Head: Normocephalic and atraumatic.      Right Ear: External ear normal.      Left Ear: External ear normal.      Nose: Nose normal.   Eyes:      Extraocular Movements: Extraocular movements intact.      Conjunctiva/sclera: Conjunctivae normal.   Pulmonary:      Effort: Pulmonary effort is normal.   Musculoskeletal:         General: Normal range of motion.      Cervical back: Normal range of motion.   Skin:     General: Skin is warm and dry.   Neurological:      General: No focal deficit present.      Mental Status: He is alert and oriented to person, place, and time. Mental status is at baseline.   Psychiatric:         Mood and Affect: Mood normal.         Behavior: Behavior normal.         Thought Content: Thought content normal.         Judgment: Judgment normal.             Result Review :   The following data was reviewed by: ALONDRA Quan on 04/02/2024:    Most Recent A1C          4/2/2024    15:51   HGBA1C Most Recent   Hemoglobin A1C 6.9        A1C Last 3 Results          11/6/2023    16:16 2/7/2024    16:49 4/2/2024    15:51   HGBA1C Last 3 Results   Hemoglobin A1C 7.70  7.40  6.9      A1c collected in the office today is 6.9%, indicating Controlled Type II diabetes.  This result is down from the prior result of 7.4% collected on 27/24    Glucose   Date Value Ref Range Status   04/02/2024 182 (H) 70 - 99 mg/dL Final     Comment:     Serial Number: [id number]:  [id number]     Point of care glucose in the office today is  elevated at 182 mg/dL    Creatinine   Date Value Ref Range Status   02/07/2024 3.96 (H) 0.76 - 1.27 mg/dL Final   11/06/2023 3.54 (H) 0.76 - 1.27 mg/dL Final     eGFR   Date Value Ref Range Status   02/07/2024 17.4 (L) >60.0 mL/min/1.73 Final   11/06/2023 19.9 (L) >60.0 mL/min/1.73 Final     Labs collected on 2/7/2024 show Stage IV severe (GFR = 15-29 mL/min    Total Cholesterol   Date Value Ref Range Status   11/06/2023 85 0 - 200 mg/dL Final    04/28/2023 120 0 - 200 mg/dL Final     Triglycerides   Date Value Ref Range Status   11/06/2023 166 (H) 0 - 150 mg/dL Final   04/28/2023 164 (H) 0 - 150 mg/dL Final     HDL Cholesterol   Date Value Ref Range Status   11/06/2023 22 (L) 40 - 60 mg/dL Final   04/28/2023 26 (L) 40 - 60 mg/dL Final     LDL Cholesterol    Date Value Ref Range Status   11/06/2023 35 0 - 100 mg/dL Final   04/28/2023 66 0 - 100 mg/dL Final     Lipid panel collected on 11/6/2023 shows Hypertriglyceridemia and low HDL            Assessment: Patient's A1c currently shows controlled type 2 diabetes.  He is not currently taking any medications for his diabetes.  He has been without his fast acting insulin for 4 to 5 months.  Patient did admit to using Tresiba 2-3 times a day when he ran out of the fast acting insulin not understanding that it was a once a day and sent.  This caused him to have problems with hypoglycemia.  He stopped this insulin as well and his hypoglycemia issues resolved.  His diabetes is complicated by multiple conditions including retinopathy with blindness, amputations, neuropathy and nephropathy.      Diagnoses and all orders for this visit:    1. Controlled type 2 diabetes mellitus with stage 4 chronic kidney disease, without long-term current use of insulin (Primary)  -     POC Glycosylated Hemoglobin (Hb A1C)  -     POC Glucose  -     Continuous Blood Gluc Sensor (FreeStyle Shawna 3 Sensor) misc; Use 1 each Every 14 (Fourteen) Days.  Dispense: 2 each; Refill: 5    2. Type 2 diabetes mellitus with diabetic neuropathy, without long-term current use of insulin    3. Blindness due to type 2 diabetes mellitus  Comments:  Left eye        Plan: The patient remain off the insulin since his A1c is now in a controlled status.  We will consider adding Januvia if approved by his nephrologist.  Message was sent to the nephrologist for his opinion.  We will put the patient on a continuous glucose monitor to more closely evaluate  glucose levels to determine if insulin will be required again.    The patient will monitor his blood glucose levels using the CGM.  If he develops problematic hyperglycemia or hypoglycemia or adverse drug reactions, he will contact the office for further instructions.        Follow Up     Return in about 3 months (around 7/2/2024) for Medication Management.    Patient was given instructions and counseling regarding his condition or for health maintenance advice. Please see specific information pulled into the AVS if appropriate.     ALONDRA Quan  04/02/2024      Dictated Utilizing Dragon Dictation.  Please note that portions of this note were completed with a voice recognition program.  Part of this note may be an electronic transcription/translation of spoken language to printed text using the Dragon Dictation System.

## 2024-03-29 ENCOUNTER — PATIENT OUTREACH (OUTPATIENT)
Dept: CASE MANAGEMENT | Facility: OTHER | Age: 54
End: 2024-03-29
Payer: COMMERCIAL

## 2024-03-29 DIAGNOSIS — F33.1 MODERATE EPISODE OF RECURRENT MAJOR DEPRESSIVE DISORDER: ICD-10-CM

## 2024-03-29 DIAGNOSIS — E11.3513 TYPE 2 DIABETES MELLITUS WITH BOTH EYES AFFECTED BY PROLIFERATIVE RETINOPATHY AND MACULAR EDEMA, WITH LONG-TERM CURRENT USE OF INSULIN: ICD-10-CM

## 2024-03-29 DIAGNOSIS — H54.40 BLINDNESS OF LEFT EYE WITH NORMAL VISION IN CONTRALATERAL EYE: ICD-10-CM

## 2024-03-29 DIAGNOSIS — Z79.4 TYPE 2 DIABETES MELLITUS WITH BOTH EYES AFFECTED BY PROLIFERATIVE RETINOPATHY AND MACULAR EDEMA, WITH LONG-TERM CURRENT USE OF INSULIN: ICD-10-CM

## 2024-03-29 DIAGNOSIS — N18.4 STAGE 4 CHRONIC KIDNEY DISEASE: Primary | ICD-10-CM
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
## 2024-04-01 ENCOUNTER — TELEPHONE (OUTPATIENT)
Dept: CASE MANAGEMENT | Facility: OTHER | Age: 54
End: 2024-04-01
Payer: COMMERCIAL

## 2024-04-02 ENCOUNTER — OFFICE VISIT (OUTPATIENT)
Dept: DIABETES SERVICES | Facility: HOSPITAL | Age: 54
End: 2024-04-02
Payer: COMMERCIAL

## 2024-04-02 VITALS
WEIGHT: 168 LBS | DIASTOLIC BLOOD PRESSURE: 71 MMHG | OXYGEN SATURATION: 98 % | HEART RATE: 73 BPM | BODY MASS INDEX: 25.46 KG/M2 | SYSTOLIC BLOOD PRESSURE: 178 MMHG | HEIGHT: 68 IN

## 2024-04-02 DIAGNOSIS — E11.22 CONTROLLED TYPE 2 DIABETES MELLITUS WITH STAGE 4 CHRONIC KIDNEY DISEASE, WITHOUT LONG-TERM CURRENT USE OF INSULIN: Primary | ICD-10-CM

## 2024-04-02 DIAGNOSIS — E11.40 TYPE 2 DIABETES MELLITUS WITH DIABETIC NEUROPATHY, WITHOUT LONG-TERM CURRENT USE OF INSULIN: ICD-10-CM

## 2024-04-02 DIAGNOSIS — H54.7 BLINDNESS DUE TO TYPE 2 DIABETES MELLITUS: ICD-10-CM

## 2024-04-02 DIAGNOSIS — N18.4 CONTROLLED TYPE 2 DIABETES MELLITUS WITH STAGE 4 CHRONIC KIDNEY DISEASE, WITHOUT LONG-TERM CURRENT USE OF INSULIN: Primary | ICD-10-CM

## 2024-04-02 DIAGNOSIS — E11.39 BLINDNESS DUE TO TYPE 2 DIABETES MELLITUS: ICD-10-CM

## 2024-04-02 LAB
EXPIRATION DATE: ABNORMAL
GLUCOSE BLDC GLUCOMTR-MCNC: 182 MG/DL (ref 70–99)
HBA1C MFR BLD: 6.9 % (ref 4.5–5.7)
Lab: ABNORMAL

## 2024-04-02 PROCEDURE — 99204 OFFICE O/P NEW MOD 45 MIN: CPT | Performed by: NURSE PRACTITIONER

## 2024-04-02 PROCEDURE — 3077F SYST BP >= 140 MM HG: CPT | Performed by: NURSE PRACTITIONER

## 2024-04-02 PROCEDURE — 3044F HG A1C LEVEL LT 7.0%: CPT | Performed by: NURSE PRACTITIONER

## 2024-04-02 PROCEDURE — 1159F MED LIST DOCD IN RCRD: CPT | Performed by: NURSE PRACTITIONER

## 2024-04-02 PROCEDURE — 1160F RVW MEDS BY RX/DR IN RCRD: CPT | Performed by: NURSE PRACTITIONER

## 2024-04-02 PROCEDURE — 82948 REAGENT STRIP/BLOOD GLUCOSE: CPT | Performed by: NURSE PRACTITIONER

## 2024-04-02 PROCEDURE — 3078F DIAST BP <80 MM HG: CPT | Performed by: NURSE PRACTITIONER

## 2024-04-02 RX ORDER — NIFEDIPINE 30 MG/1
2 TABLET, EXTENDED RELEASE ORAL DAILY
COMMUNITY
Start: 2024-03-04

## 2024-04-02 RX ORDER — BLOOD-GLUCOSE METER
KIT MISCELLANEOUS 4 TIMES DAILY
COMMUNITY
Start: 2024-02-07

## 2024-04-02 RX ORDER — BLOOD-GLUCOSE SENSOR
1 EACH MISCELLANEOUS
Qty: 2 EACH | Refills: 5 | Status: SHIPPED | OUTPATIENT
Start: 2024-04-02

## 2024-04-03 ENCOUNTER — TELEPHONE (OUTPATIENT)
Dept: DIABETES SERVICES | Facility: HOSPITAL | Age: 54
End: 2024-04-03
Payer: COMMERCIAL

## 2024-04-05 ENCOUNTER — PATIENT ROUNDING (BHMG ONLY) (OUTPATIENT)
Dept: DIABETES SERVICES | Facility: HOSPITAL | Age: 54
End: 2024-04-05
Payer: COMMERCIAL

## 2024-04-05 NOTE — PROGRESS NOTES
April 5, 2024    Hello, may I speak with Rogelio Dee?    My name is Roberta      I am  with [facility]  [address] [place], [place] [address]  [place] [address] [phone].    Before we get started may I verify your date of birth? 1970    I am calling to officially welcome you to our practice and ask about your recent visit. Is this a good time to talk? yes    Tell me about your visit with us. What things went well?  My appointment went well. I am happy with it.       We're always looking for ways to make our patients' experiences even better. Do you have recommendations on ways we may improve?  no    Overall were you satisfied with your first visit to our practice? yes       I appreciate you taking the time to speak with me today. Is there anything else I can do for you? no      Thank you, and have a great day.

## 2024-04-09 ENCOUNTER — PATIENT OUTREACH (OUTPATIENT)
Dept: CASE MANAGEMENT | Facility: OTHER | Age: 54
End: 2024-04-09
Payer: COMMERCIAL

## 2024-04-09 DIAGNOSIS — E11.3513 TYPE 2 DIABETES MELLITUS WITH BOTH EYES AFFECTED BY PROLIFERATIVE RETINOPATHY AND MACULAR EDEMA, WITH LONG-TERM CURRENT USE OF INSULIN: ICD-10-CM

## 2024-04-09 DIAGNOSIS — N18.4 STAGE 4 CHRONIC KIDNEY DISEASE: Primary | ICD-10-CM

## 2024-04-09 DIAGNOSIS — H54.40 BLINDNESS OF LEFT EYE WITH NORMAL VISION IN CONTRALATERAL EYE: ICD-10-CM

## 2024-04-09 DIAGNOSIS — Z79.4 TYPE 2 DIABETES MELLITUS WITH BOTH EYES AFFECTED BY PROLIFERATIVE RETINOPATHY AND MACULAR EDEMA, WITH LONG-TERM CURRENT USE OF INSULIN: ICD-10-CM

## 2024-04-09 NOTE — OUTREACH NOTE
AMBULATORY CASE MANAGEMENT NOTE    Name and Relationship of Patient/Support Person: Rogelio Dee S - Self    [facility] Interim Update    Called patient to confirm his podiatry appointment for this week. He has it but needs address. He reports he has all of his further April appointments written down for 4/24/24 with Dr. Hutson and 4/29/24 for eye doctor at [facility] in [facility] and he is aware I will follow up chart review after each visit and call at end of month to check on him, otherwise he is doing well. He has been in CCM for 2 years and will discuss goals and once these are met closing program out.     Care Coordination    Outreached to Podiatry for office address and noted it is [address]  per office staff and it shows [address] on line. I called patient back and gave him the address and he wrote it down.         Education Documentation  Description, taught by Tami Granados, RN at 4/9/2024  2:51 PM.  Learner: Patient  Readiness: Acceptance  Method: Explanation  Response: Verbalizes Understanding  Comment: discussed need for prevenattive care and importance of follow up with specialists on a regular basis, reviewed up coming appointments    preventive care, taught by Tami Granados, RN at 4/9/2024  2:51 PM.  Learner: Patient  Readiness: Acceptance  Method: Explanation  Response: Verbalizes Understanding  Comment: discussed need for prevenattive care and importance of follow up with specialists on a regular basis, reviewed up coming appointments    Definition, taught by Tami Granados, RN at 4/9/2024  2:51 PM.  Learner: Patient  Readiness: Acceptance  Method: Explanation  Response: Verbalizes Understanding  Comment: discussed need for prevenattive care and importance of follow up with specialists on a regular basis, reviewed up coming appointments          Tami FLEMING  Ambulatory Case Management    4/9/2024, 14:45 EDT

## 2024-04-11 ENCOUNTER — OFFICE VISIT (OUTPATIENT)
Dept: PODIATRY | Facility: CLINIC | Age: 54
End: 2024-04-11
Payer: COMMERCIAL

## 2024-04-11 VITALS
BODY MASS INDEX: 25.24 KG/M2 | DIASTOLIC BLOOD PRESSURE: 84 MMHG | HEART RATE: 76 BPM | WEIGHT: 166 LBS | TEMPERATURE: 97.6 F | OXYGEN SATURATION: 97 % | SYSTOLIC BLOOD PRESSURE: 144 MMHG

## 2024-04-11 DIAGNOSIS — G62.9 NEUROPATHY: ICD-10-CM

## 2024-04-11 DIAGNOSIS — E11.42 TYPE 2 DIABETES MELLITUS WITH DIABETIC POLYNEUROPATHY, WITH LONG-TERM CURRENT USE OF INSULIN: ICD-10-CM

## 2024-04-11 DIAGNOSIS — Z79.4 TYPE 2 DIABETES MELLITUS WITH DIABETIC POLYNEUROPATHY, WITH LONG-TERM CURRENT USE OF INSULIN: ICD-10-CM

## 2024-04-11 DIAGNOSIS — L60.0 ONYCHOCRYPTOSIS: ICD-10-CM

## 2024-04-11 DIAGNOSIS — M79.671 FOOT PAIN, BILATERAL: Primary | ICD-10-CM

## 2024-04-11 DIAGNOSIS — S98.139A AMPUTATED TOE, UNSPECIFIED LATERALITY: ICD-10-CM

## 2024-04-11 DIAGNOSIS — B35.1 ONYCHOMYCOSIS: ICD-10-CM

## 2024-04-11 DIAGNOSIS — M79.672 FOOT PAIN, BILATERAL: Primary | ICD-10-CM

## 2024-04-11 NOTE — PROGRESS NOTES
[facility] - PODIATRY    Today's Date: 04/11/24    Patient Name: Rogelio Dee  MRN: [id number]  CSN: [id number]  PCP: Simeon Lomeli MD, Last PCP Visit: 2/7/2024  Referring Provider: Smieon Lomeli MD    SUBJECTIVE     Chief Complaint   Patient presents with    Left Foot - Follow-up, Nail Problem, Diabetes    Right Foot - Follow-up, Nail Problem, Diabetes     HPI: Rogelio Dee, a 54 y.o.male, presents to clinic for painful toenail and a diabetic foot evaluation.    New, Established, New Problem:  est  Location:  Toenails  Duration:   Greater than five years  Onset:  Gradual  Nature:  sore with palpation.  Stable, worsening, improving:   recurring  Aggravating factors:  Pain with shoe gear and ambulation.  Previous Treatment: debridement    Patient controlling diabetes via: Insulin, poorly controlled    Patient states their last blood glucose was: 215    Patient denies any fevers, chills, nausea, vomiting, shortness of breath, nor any other constitutional signs nor symptoms.    No recent medical changes.      Past Medical History:   Diagnosis Date    Blindness of left eye     Chronic diastolic CHF 07/15/2021    Chronic pain syndrome     Diabetes mellitus, type 2     Diabetic neuropathy 07/15/2021    Hyperlipidemia     Hypertension     Lumbosacral disc herniation     L5-S1    Metabolic acidosis     Smoker 07/15/2021    Stage 4 CKD 07/15/2021    Status post amputation of great toe, left     Status post amputation of great toe, right     Type 2 DM with proliferative retinopathy with macular edema, bilateral 03/24/2021    PDR OD - New VH since last visit.  Missed appt in [place] for PRP, partially because of ride issues and partially because he is hesitant to undergo PRP because it is uncomfortable.  Avastin today.  Discussed retrobulbar for PRP vs. Treatment with anti-VEGF exclusively.  Pt will consider options.  If doing PRP, will try to get done in 1 session in order to minimize  "trips to [place].  PDR OS -     Past Surgical History:   Procedure Laterality Date    LUMBAR DISC SURGERY      TOE AMPUTATION       Family History   Problem Relation Age of Onset    Diabetes Mother     Diabetes Paternal Grandmother      Social History     Socioeconomic History    Marital status:    Tobacco Use    Smoking status: Every Day     Current packs/day: 0.50     Average packs/day: 0.5 packs/day for 30.0 years (15.0 ttl pk-yrs)     Types: Cigarettes     Passive exposure: Current    Smokeless tobacco: Never    Tobacco comments:     10 cigs a day   Vaping Use    Vaping status: Former   Substance and Sexual Activity    Alcohol use: Never    Drug use: Never    Sexual activity: Defer     Allergies   Allergen Reactions    Kayexalate [Sodium Polystyrene Sulfonate] GI Intolerance     \"Can not make it to the bathroom fast enough\"    Oxycodone Mental Status Change and Other (See Comments)     Current Outpatient Medications   Medication Sig Dispense Refill    amLODIPine (NORVASC) 10 MG tablet amlodipine 10 mg tablet      aspirin 81 MG EC tablet TAKE 1 TABLET BY MOUTH EVERY DAY 90 tablet 1    atorvastatin (LIPITOR) 20 MG tablet TAKE 1 TABLET BY MOUTH DAILY 90 tablet 2    Blood Glucose Monitoring Suppl (Advocate Blood Glucose Monitor) device Use 1 Units 4 (Four) Times a Day Before Meals & at Bedtime. 1 each 0    Blood Glucose Monitoring Suppl (FreeStyle Lite) w/Device kit by Other route 4 (Four) Times a Day. use to test blood sugar      carvedilol (COREG) 25 MG tablet TAKE 1 TABLET BY MOUTH TWICE DAILY WITH MEALS 180 tablet 2    cetirizine (zyrTEC) 5 MG tablet Take 1 tablet by mouth Daily. 90 tablet 3    Cholecalciferol 50 MCG (2000 UT) capsule Take 1 capsule by mouth Daily. 90 each 3    citalopram (CeleXA) 40 MG tablet TAKE 1 TABLET BY MOUTH DAILY 90 tablet 2    cloNIDine (CATAPRES) 0.1 MG tablet Take 2 tablets by mouth 2 (Two) Times a Day. 360 tablet 3    Continuous Blood Gluc Sensor (FreeStyle Shawna 3 " Sensor) misc Use 1 each Every 14 (Fourteen) Days. 2 each 5    cyclobenzaprine (FLEXERIL) 10 MG tablet Every 8 (Eight) Hours.      dorzolamide-timolol (Cosopt) 2-0.5 % ophthalmic solution Administer 1 drop to both eyes 2 (Two) Times a Day. 10 mL 12    ferrous sulfate 325 (65 FE) MG tablet FeroSul 325 mg (65 mg iron) tablet   TAKE 1 TABLET BY MOUTH TWICE DAILY      furosemide (LASIX) 40 MG tablet TAKE 1 TABLET BY MOUTH DAILY 90 tablet 2    glucose blood test strip Use as instructed 100 each 12    hydrALAZINE (APRESOLINE) 100 MG tablet TAKE 1 TABLET BY MOUTH THREE TIMES DAILY 270 tablet 2    HYDROcodone-acetaminophen (NORCO) 7.5-325 MG per tablet Every 6 (Six) Hours.      Lancets (freestyle) lancets Check blood sugar every morning (AM fasting) as well as before lunch and dinner and before bedtime 100 each 12    NIFEdipine XL (PROCARDIA XL) 30 MG 24 hr tablet Take 2 tablets by mouth Daily.      omeprazole (priLOSEC) 40 MG capsule TAKE 1 CAPSULE BY MOUTH DAILY 90 capsule 2    rOPINIRole (REQUIP) 0.25 MG tablet TAKE 1 TABLET BY MOUTH EVERY NIGHT 1 HOUR BEFORE BEDTIME 90 tablet 2    sodium bicarbonate 650 MG tablet Take 1 tablet by mouth 3 (Three) Times a Day. 270 tablet 2    tamsulosin (FLOMAX) 0.4 MG capsule 24 hr capsule TAKE 1 CAPSULE BY MOUTH DAILY 90 capsule 2     No current facility-administered medications for this visit.     Review of Systems   Constitutional: Negative.    Skin:         Painful toenails   Neurological:  Positive for numbness.   All other systems reviewed and are negative.      OBJECTIVE     Vitals:    04/11/24 1021   BP: 144/84   Pulse: 76   Temp: 97.6 °F (36.4 °C)   SpO2: 97%       Body mass index is 25.24 kg/m².    Lab Results   Component Value Date    HGBA1C 6.9 (A) 04/02/2024       Lab Results   Component Value Date    GLUCOSE 254 (H) 02/07/2024    CALCIUM 9.4 02/07/2024     02/07/2024    K 4.0 02/07/2024    CO2 20.0 (L) 02/07/2024     02/07/2024    BUN 55 (H) 02/07/2024     CREATININE 3.96 (H) 02/07/2024    EGFRIFNONA 26 (L) 07/01/2021    BCR 13.9 02/07/2024    ANIONGAP 13.0 02/07/2024       Patient seen in no apparent distress.      PHYSICAL EXAM:     Foot/Ankle Exam    GENERAL  Diabetic foot exam performed    Appearance:  chronically ill  Orientation:  AAOx3  Affect:  appropriate  Gait:  antalgic  Assistance:  cane use  Right shoe gear: boot  Left shoe gear: boot    VASCULAR     Right Foot Vascularity   Normal vascular exam    Dorsalis pedis:  2+  Posterior tibial:  2+  Skin temperature:  warm  Edema grading:  None  CFT:  < 3 seconds  Pedal hair growth:  Present  Varicosities:  none     Left Foot Vascularity   Normal vascular exam    Dorsalis pedis:  2+  Posterior tibial:  2+  Skin temperature:  warm  Edema grading:  None  CFT:  < 3 seconds  Pedal hair growth:  Present  Varicosities:  none     NEUROLOGIC     Right Foot Neurologic   Light touch sensation: absent  Vibratory sensation: absent  Hot/Cold sensation: absent     Left Foot Neurologic   Light touch sensation: absent  Vibratory sensation: absent  Hot/Cold sensation:  absent    MUSCULOSKELETAL     Right Foot Musculoskeletal    Amputation   Toes amputated: first toe  Hammertoe:  Second toe, third toe, fourth toe and fifth toe     Left Foot Musculoskeletal    Amputation   Toes amputated: first toe  Hammertoe:  Second toe, third toe, fourth toe and fifth toe    MUSCLE STRENGTH     Right Foot Muscle Strength   Foot dorsiflexion:  4-  Foot plantar flexion:  4-  Foot inversion:  4-  Foot eversion:  4-     Left Foot Muscle Strength   Foot dorsiflexion:  4-  Foot plantar flexion:  4-  Foot inversion:  4-  Foot eversion:  4-    RANGE OF MOTION     Right Foot Range of Motion   Foot and ankle ROM within normal limits       Left Foot Range of Motion   Foot and ankle ROM within normal limits      DERMATOLOGIC      Right Foot Dermatologic   Skin  Right foot skin is intact.   Nails  2.  Positive for elongated, onychomycosis, abnormal  thickness, subungual debris and ingrown toenail.  3.  Positive for elongated, onychomycosis, abnormal thickness, subungual debris and ingrown toenail.  4.  Positive for elongated, onychomycosis, abnormal thickness, subungual debris and ingrown toenail.  5.  Positive for elongated, onychomycosis, abnormal thickness, subungual debris and ingrown toenail.  Nails comment:  Toenails 2, 3, 4, and 5     Left Foot Dermatologic   Skin  Left foot skin is intact.   Nails comment:  Toenails 2, 3, 4, and 5  Nails  2.  Positive for elongated, onychomycosis, abnormal thickness, subungual debris and ingrown toenail.  3.  Positive for elongated, onychomycosis, abnormal thickness, subungual debris and ingrown toenail.  4.  Positive for elongated, onychomycosis, abnormally thick, subungual debris and ingrown toenail.  5.  Positive for elongated, onychomycosis, abnormally thick, subungual debris and ingrown toenail.    Diabetic Foot Exam Performed and Monofilament Test Performed    ASSESSMENT/PLAN     Diagnoses and all orders for this visit:    1. Foot pain, bilateral (Primary)    2. Onychomycosis    3. Onychocryptosis    4. Amputated toe, unspecified laterality    5. Type 2 diabetes mellitus with diabetic polyneuropathy, with long-term current use of insulin    6. Neuropathy    Prescriptions written for diabetic shoes.    Comprehensive lower extremity examination and evaluation was performed.    Discussed findings and treatment plan including risks, benefits, and treatment options with patient in detail. Patient agreed with treatment plan.    Medications and allergies reviewed.  Reviewed available blood glucose and HgB A1C lab values along with other pertinent labs.  These were discussed with the patient as to their importance of diabetic maintenance.    Toenails 2, 3, 4, 5 on Right and 2, 3, 4, 5 on Left were debrided with nail nippers then filed with a Roberto nail karla.  Patient tolerated procedure well without  complications.    Diabetic foot exam performed and documented this date, compliant with CQM required standards. Detail of findings as noted in physical exam.  Lower extremity Neurologic exam for diabetic patient performed and documented this date, compliant with PQRS required standards. Detail of findings as noted in physical exam.  Advised patient importance of good routine lower extremity hygiene. Advised patient importance of evaluating for intact skin and pain free nail borders.  Advised patient to use mirror to evaluate plantar/ soles of feet for better visualization. Advised patient monitor and phone office to be seen if any cracking to skin, open lesions, painful nail borders or if nails become elongated prior to next visit. Advised patient importance of daily cleansing of lower extremities, followed by good skin cream to maintain normal hydration of skin. Also advised patient importance of close daily monitoring of blood sugar. Advised to regulate diet and medications to maintain control of blood sugar in optimal range. Contact primary care provider if difficulties maintaining blood sugar levels.  Advised Patient of presence of Diabetes Mellitus condition.  Advised Patient risk of progression and worsening or improvement, then return of condition.  Will monitor condition for any change in future. Treat with most appropriate treatment pending status of condition.  Counseled and advised patient extensively on nature and ramifications of diabetes. Standard instructions given to patient for good diabetic foot care and maintenance. Advised importance of careful monitoring to avoid break down and complications secondary to diabetes. Advised patient importance of strict maintenance of blood sugar control. Advised patient of possible ominous results from neglect of condition, i.e.: amputation/ loss of digits, feet and legs, or even death.  Patient states understands counseling, will monitor closely, continue good  hygiene and routine diabetic foot care. Patient will contact office should they have any questions or problems.      An After Visit Summary was printed and given to the patient at discharge, including (if requested) any available informative/educational handouts regarding diagnosis, treatment, or medications. All questions were answered to patient/family satisfaction. Should symptoms fail to improve or worsen they agree to call or return to clinic or to go to the Emergency Department. Discussed the importance of following up with any needed screening tests/labs/specialist appointments and any requested follow-up recommended by me today. Importance of maintaining follow-up discussed and patient accepts that missed appointments can delay diagnosis and potentially lead to worsening of conditions.    Return in about 9 weeks (around 6/13/2024) for Toenail Care., or sooner if acute issues arise.    This document has been electronically signed by Chai Farias DPM on April 11, 2024 10:29 EDT

## 2024-04-25 ENCOUNTER — TELEPHONE (OUTPATIENT)
Dept: CASE MANAGEMENT | Facility: OTHER | Age: 54
End: 2024-04-25
Payer: COMMERCIAL

## 2024-04-25 ENCOUNTER — PATIENT OUTREACH (OUTPATIENT)
Dept: CASE MANAGEMENT | Facility: OTHER | Age: 54
End: 2024-04-25
Payer: COMMERCIAL

## 2024-04-25 DIAGNOSIS — Z79.4 TYPE 2 DIABETES MELLITUS WITH BOTH EYES AFFECTED BY PROLIFERATIVE RETINOPATHY AND MACULAR EDEMA, WITH LONG-TERM CURRENT USE OF INSULIN: ICD-10-CM

## 2024-04-25 DIAGNOSIS — N18.4 STAGE 4 CHRONIC KIDNEY DISEASE: Primary | ICD-10-CM

## 2024-04-25 DIAGNOSIS — E11.3513 TYPE 2 DIABETES MELLITUS WITH BOTH EYES AFFECTED BY PROLIFERATIVE RETINOPATHY AND MACULAR EDEMA, WITH LONG-TERM CURRENT USE OF INSULIN: ICD-10-CM

## 2024-04-25 NOTE — TELEPHONE ENCOUNTER
"Noted per chart review of visit with Nephrology yesterday, notes as follows:    Physical Exam  Vitals:   04/24/24 1109   BP: 120/70   BP Location: Left upper arm   Patient Position: Sitting   BP Cuff Size: Large adult   Pulse: 76   Weight: 164 lb (74.4 kg)   Height: 5' 8\" (1.727 m)     Assessment & Plan   1. Chronic kidney disease stage 4 (HCC)   2. Anemia in chronic kidney disease   3. Vitamin D deficiency, not otherwise specified   4. Type 2 diabetes mellitus with diabetic chronic kidney disease (HCC)   5. Essential hypertension   6. Chronic diastolic heart failure (HCC)   7. Other hyperlipidemia   8. Other specified anemia   9. Metabolic acidosis, not otherwise specified   10. Other proteinuria   11. Hyperkalemia     Laboratory data:  4/2/2024: A1c 6.9%. 2/7/2024: Creatinine 3.9, GFR 17.4 with normal electrolytes, A1c 7.4%, hemoglobin 10.0.  March 2023: Creatinine 3.8, GFR 18, vitamin D 31, , hemoglobin 10.5, urinalysis bland, UPC 3 g. 10/21/2022: creatinine 3.46, GFR 21, Na 131, glucose 248, A1c 10%. 7/21/2022: Creatinine 3.49, GFR 20, bicarb 20, sodium 127 with glucose 461. A1c 9.8%, , iron saturation 21%, vitamin D 34 with hemoglobin of 10.9. 4/21/22: Creatinine 3.18, GFR 22.7, Sodium 129, CO2 20.9, A1c 10.5, Hemoglobin 11.1, Microalbumin/creatinine 84/40. 9/17/20: cr 4.03, GFR 16, K 4.3, Na 133. 6/15/20: hgb 9.2, cr 3.8, GFR 17, K 5.5, Co2 18, Po4 4.6, tsat 34%, ferritin 209, transferrin 203, PTH 67, vit D 36, Up/c: 53/79. 1/8/2020: Vitamin D 26, A1c 6.2, UPC 60/13 9, and urinalysis plan, hemoglobin 10.2, creatinine 3.1, GFR 23, normal electrolytes, potassium 4.8, , phosphorus 4.7. 10/15/2019: Creatinine 2.9, hemoglobin 9.7, UPC 1810 mg/g. EGFR 25 cc/min. potassium 4.8. vitamin D 51. Urinalysis bland. 10/2/2019: Creatinine 3.0 EGFR 24, potassium 5.5 vitamin D 18.9. On 9/18/2019: Creatinine 2.2, bicarb 20. Peak creatinine 4.5. July 2019: Creatinine 2.1, 3000 mg of protein in the urine and " A1c 13.5.    9/13/2019: CT scan abdomen pelvis without contrast showed no hydronephrosis.    Assessment and plan:  - Chronic kidney disease stage IV with previously bland UA and subnephrotic range proteinuria secondary to diabetic nephropathy and hypertension.  Renal function has progressed over time. Clinically he seems stable without nubia uremic symptoms. He is not on ACE inhibitor or ARB due to hyperkalemia and advanced kidney disease. He is not on SGLT2 inhibitors at this time.  Previously had refused dialysis.  Discussed with him at this time. Discussed with him that dialysis would be a bridge to transplantation.  Will refer for TOPS education program and to vascular for AV fistula placement. Discussed with him the importance of smoking cessation and will consider transplant referral upon return.  - Hyperkalemia, now controlled.  - Hypertension, blood pressure is well-controlled now. Continue same medications and avoid high salt intake.  - Diabetes with complications including retinopathy, neuropathy and nephropathy. Target A1c below 7%.  - Urinary retention, PVR above 890 cc, required Nolasco catheter in the hospital. Resolved. Will reorder renal ultrasound and check PVR as well.  - Tobacco use, cessation recommended. Discussed with him again.  - Anemia, recheck iron studies, no obvious bleeding. Will also check B12 and folate.  - Vitamin D deficiency, with secondary hyperparathyroidism, continue vitamin D 2000 units daily. Will recheck upon return.  - Gout, check uric acid level on return  - Metabolic acidosis, taking sodium bicarb. Recheck upon return.    Orders Placed This Encounter  Ultrasound renal complete  Protein, Total, Random Urine w/Creatinine (Protein/Creat Ratio)  CBC and Differential  Ferritin  Folate  Iron Panel (Fe, TIBC, TSAT)  PTH, Intact  Renal Function Panel  Uric Acid  Urinalysis with microscopic  Vitamin B12  Vitamin D 25 Hydroxy  Ambulatory referral to Vascular Surgery  Ambulatory  referral to Renal Treatment Options Education    Return in about 2 months (around 6/24/2024).      Marietta Hutson MD  Electronically signed by Marietta Hutson MD at 04/24/2024 10:12 PM EDT     Medication list reviewed and a few discrepancies noted, Nephrology has Insulin Degludec 100unit/ml, 10 units daily, Neurontin 800 mg TID and Insulin Lispro 100 UNITS/ML 8 units TID before meals that we do not have documented and we have Ferrous sulfate 325 mg, Amlodipine 10 mg and Cetrizine 5 mg they do not have listed. Will do an outreach to patient to confirm medication list.

## 2024-04-25 NOTE — OUTREACH NOTE
AMBULATORY CASE MANAGEMENT NOTE    Names and Relationships of Patient/Support Persons: Caller: Rogelio Dee; Relationship: Self -     CCM Interim Update    Called patient to review medication list as discrepancies noted and clarified with him. Updates made to list and sent to PCP to sign off on. He is on Iron and Amlodipine but not on Zyrtec from out med list, he is not on Lispro Insulin or Neurotin on Nephrology list but does take 10 units daily of Degludec insulin.         Education Documentation  No documentation found.        Tami H  Ambulatory Case Management    4/25/2024, 16:48 EDT

## 2024-04-25 NOTE — TELEPHONE ENCOUNTER
Medication list reviewed with CCM call after noted discrepancies with Nephrology list and ours, please review and sign off on his current list he reports he is on if in agreement.     TY

## 2024-04-26 NOTE — TELEPHONE ENCOUNTER
"I have reviewed the documentation that I was able to find from yesterday.  Thank you for updating me on these medication changes.  If I missed anything, please let me know.  I do see the following:    \"Called patient to review medication list as discrepancies noted and clarified with him. Updates made to list and sent to PCP to sign off on. He is on Iron and Amlodipine but not on Zyrtec from out med list, he is not on Lispro Insulin or Neurotin on Nephrology list but does take 10 units daily of Degludec insulin. \"    Are these the only changes you are referring to?  "

## 2024-04-29 DIAGNOSIS — N25.81 SECONDARY HYPERPARATHYROIDISM: ICD-10-CM

## 2024-04-30 ENCOUNTER — PATIENT OUTREACH (OUTPATIENT)
Dept: CASE MANAGEMENT | Facility: OTHER | Age: 54
End: 2024-04-30
Payer: COMMERCIAL

## 2024-04-30 ENCOUNTER — TRANSCRIBE ORDERS (OUTPATIENT)
Dept: VASCULAR SURGERY | Facility: HOSPITAL | Age: 54
End: 2024-04-30
Payer: COMMERCIAL

## 2024-04-30 DIAGNOSIS — E13.49 OTHER DIABETIC NEUROLOGICAL COMPLICATION ASSOCIATED WITH OTHER SPECIFIED DIABETES MELLITUS: ICD-10-CM

## 2024-04-30 DIAGNOSIS — N18.4 STAGE 4 CHRONIC KIDNEY DISEASE: Primary | ICD-10-CM

## 2024-04-30 DIAGNOSIS — F33.1 MODERATE EPISODE OF RECURRENT MAJOR DEPRESSIVE DISORDER: ICD-10-CM

## 2024-04-30 DIAGNOSIS — N18.4 CHRONIC KIDNEY DISEASE, STAGE IV (SEVERE): Primary | ICD-10-CM

## 2024-04-30 DIAGNOSIS — Z79.4 TYPE 2 DIABETES MELLITUS WITH BOTH EYES AFFECTED BY PROLIFERATIVE RETINOPATHY AND MACULAR EDEMA, WITH LONG-TERM CURRENT USE OF INSULIN: ICD-10-CM

## 2024-04-30 DIAGNOSIS — E11.3513 TYPE 2 DIABETES MELLITUS WITH BOTH EYES AFFECTED BY PROLIFERATIVE RETINOPATHY AND MACULAR EDEMA, WITH LONG-TERM CURRENT USE OF INSULIN: ICD-10-CM

## 2024-04-30 RX ORDER — ACETAMINOPHEN 160 MG
2000 TABLET,DISINTEGRATING ORAL DAILY
Qty: 90 CAPSULE | Refills: 3 | Status: SHIPPED | OUTPATIENT
Start: 2024-04-30
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
## 2024-05-02 ENCOUNTER — TRANSCRIBE ORDERS (OUTPATIENT)
Dept: ADMINISTRATIVE | Facility: HOSPITAL | Age: 54
End: 2024-05-02
Payer: COMMERCIAL

## 2024-05-02 DIAGNOSIS — N18.4 CKD (CHRONIC KIDNEY DISEASE), SYMPTOM MANAGEMENT ONLY, STAGE 4 (SEVERE): Primary | ICD-10-CM

## 2024-05-08 ENCOUNTER — TELEPHONE (OUTPATIENT)
Dept: CASE MANAGEMENT | Facility: OTHER | Age: 54
End: 2024-05-08
Payer: COMMERCIAL

## 2024-05-09 ENCOUNTER — OFFICE VISIT (OUTPATIENT)
Dept: BEHAVIORAL HEALTH | Facility: CLINIC | Age: 54
End: 2024-05-09
Payer: COMMERCIAL

## 2024-05-09 VITALS
SYSTOLIC BLOOD PRESSURE: 143 MMHG | HEIGHT: 68 IN | BODY MASS INDEX: 25.46 KG/M2 | HEART RATE: 89 BPM | OXYGEN SATURATION: 99 % | DIASTOLIC BLOOD PRESSURE: 74 MMHG | WEIGHT: 168 LBS

## 2024-05-09 DIAGNOSIS — F41.1 GENERALIZED ANXIETY DISORDER: ICD-10-CM

## 2024-05-09 DIAGNOSIS — F33.1 MODERATE EPISODE OF RECURRENT MAJOR DEPRESSIVE DISORDER: Primary | ICD-10-CM

## 2024-05-10 ENCOUNTER — OFFICE VISIT (OUTPATIENT)
Dept: INTERNAL MEDICINE | Facility: CLINIC | Age: 54
End: 2024-05-10
Payer: COMMERCIAL

## 2024-05-10 VITALS
HEIGHT: 68 IN | DIASTOLIC BLOOD PRESSURE: 86 MMHG | OXYGEN SATURATION: 95 % | WEIGHT: 164 LBS | HEART RATE: 90 BPM | BODY MASS INDEX: 24.86 KG/M2 | SYSTOLIC BLOOD PRESSURE: 130 MMHG | TEMPERATURE: 98 F

## 2024-05-10 DIAGNOSIS — Z79.4 TYPE 2 DIABETES MELLITUS WITH BOTH EYES AFFECTED BY PROLIFERATIVE RETINOPATHY AND MACULAR EDEMA, WITH LONG-TERM CURRENT USE OF INSULIN: ICD-10-CM

## 2024-05-10 DIAGNOSIS — E66.3 OVERWEIGHT (BMI 25.0-29.9): ICD-10-CM

## 2024-05-10 DIAGNOSIS — Z59.41 FOOD INSECURITY: ICD-10-CM

## 2024-05-10 DIAGNOSIS — R94.31 PROLONGED Q-T INTERVAL ON ECG: ICD-10-CM

## 2024-05-10 DIAGNOSIS — N25.81 SECONDARY HYPERPARATHYROIDISM: ICD-10-CM

## 2024-05-10 DIAGNOSIS — Z00.00 ANNUAL PHYSICAL EXAM: Primary | ICD-10-CM

## 2024-05-10 DIAGNOSIS — F17.210 CIGARETTE NICOTINE DEPENDENCE WITHOUT COMPLICATION: ICD-10-CM

## 2024-05-10 DIAGNOSIS — E11.3513 TYPE 2 DIABETES MELLITUS WITH BOTH EYES AFFECTED BY PROLIFERATIVE RETINOPATHY AND MACULAR EDEMA, WITH LONG-TERM CURRENT USE OF INSULIN: ICD-10-CM

## 2024-05-10 DIAGNOSIS — N18.4 STAGE 4 CHRONIC KIDNEY DISEASE: ICD-10-CM

## 2024-05-10 DIAGNOSIS — R55 SYNCOPE, UNSPECIFIED SYNCOPE TYPE: ICD-10-CM

## 2024-05-10 DIAGNOSIS — F33.1 MODERATE EPISODE OF RECURRENT MAJOR DEPRESSIVE DISORDER: ICD-10-CM

## 2024-05-10 DIAGNOSIS — I10 ESSENTIAL HYPERTENSION: ICD-10-CM

## 2024-05-10 DIAGNOSIS — E87.20 METABOLIC ACIDOSIS: ICD-10-CM

## 2024-05-10 DIAGNOSIS — E78.2 HYPERLIPEMIA, MIXED: ICD-10-CM

## 2024-05-10 DIAGNOSIS — I50.32 CHRONIC DIASTOLIC (CONGESTIVE) HEART FAILURE: ICD-10-CM

## 2024-05-10 PROCEDURE — 85025 COMPLETE CBC W/AUTO DIFF WBC: CPT | Performed by: STUDENT IN AN ORGANIZED HEALTH CARE EDUCATION/TRAINING PROGRAM

## 2024-05-10 PROCEDURE — 84443 ASSAY THYROID STIM HORMONE: CPT | Performed by: STUDENT IN AN ORGANIZED HEALTH CARE EDUCATION/TRAINING PROGRAM

## 2024-05-10 PROCEDURE — 80061 LIPID PANEL: CPT | Performed by: STUDENT IN AN ORGANIZED HEALTH CARE EDUCATION/TRAINING PROGRAM

## 2024-05-10 PROCEDURE — 82570 ASSAY OF URINE CREATININE: CPT | Performed by: STUDENT IN AN ORGANIZED HEALTH CARE EDUCATION/TRAINING PROGRAM

## 2024-05-10 PROCEDURE — 80053 COMPREHEN METABOLIC PANEL: CPT | Performed by: STUDENT IN AN ORGANIZED HEALTH CARE EDUCATION/TRAINING PROGRAM

## 2024-05-10 PROCEDURE — 82043 UR ALBUMIN QUANTITATIVE: CPT | Performed by: STUDENT IN AN ORGANIZED HEALTH CARE EDUCATION/TRAINING PROGRAM

## 2024-05-10 SDOH — ECONOMIC STABILITY - FOOD INSECURITY: FOOD INSECURITY: Z59.41

## 2024-05-11 LAB
ALBUMIN SERPL-MCNC: 3.8 G/DL (ref 3.5–5.2)
ALBUMIN UR-MCNC: 98 MG/DL
ALBUMIN/GLOB SERPL: 1.3 G/DL
ALP SERPL-CCNC: 127 U/L (ref 39–117)
ALT SERPL W P-5'-P-CCNC: 12 U/L (ref 1–41)
ANION GAP SERPL CALCULATED.3IONS-SCNC: 14.8 MMOL/L (ref 5–15)
AST SERPL-CCNC: 12 U/L (ref 1–40)
BASOPHILS # BLD AUTO: 0.08 10*3/MM3 (ref 0–0.2)
BASOPHILS NFR BLD AUTO: 0.9 % (ref 0–1.5)
BILIRUB SERPL-MCNC: <0.2 MG/DL (ref 0–1.2)
BUN SERPL-MCNC: 63 MG/DL (ref 6–20)
BUN/CREAT SERPL: 16.3 (ref 7–25)
CALCIUM SPEC-SCNC: 8.8 MG/DL (ref 8.6–10.5)
CHLORIDE SERPL-SCNC: 103 MMOL/L (ref 98–107)
CHOLEST SERPL-MCNC: 97 MG/DL (ref 0–200)
CO2 SERPL-SCNC: 17.2 MMOL/L (ref 22–29)
CREAT SERPL-MCNC: 3.87 MG/DL (ref 0.76–1.27)
CREAT UR-MCNC: 68.6 MG/DL
DEPRECATED RDW RBC AUTO: 41 FL (ref 37–54)
EGFRCR SERPLBLD CKD-EPI 2021: 17.7 ML/MIN/1.73
EOSINOPHIL # BLD AUTO: 0.19 10*3/MM3 (ref 0–0.4)
EOSINOPHIL NFR BLD AUTO: 2.1 % (ref 0.3–6.2)
ERYTHROCYTE [DISTWIDTH] IN BLOOD BY AUTOMATED COUNT: 11.9 % (ref 12.3–15.4)
GLOBULIN UR ELPH-MCNC: 2.9 GM/DL
GLUCOSE SERPL-MCNC: 297 MG/DL (ref 65–99)
HCT VFR BLD AUTO: 29.5 % (ref 37.5–51)
HDLC SERPL-MCNC: 24 MG/DL (ref 40–60)
HGB BLD-MCNC: 9.3 G/DL (ref 13–17.7)
IMM GRANULOCYTES # BLD AUTO: 0.04 10*3/MM3 (ref 0–0.05)
IMM GRANULOCYTES NFR BLD AUTO: 0.4 % (ref 0–0.5)
LDLC SERPL CALC-MCNC: 39 MG/DL (ref 0–100)
LDLC/HDLC SERPL: 1.27 {RATIO}
LYMPHOCYTES # BLD AUTO: 1.19 10*3/MM3 (ref 0.7–3.1)
LYMPHOCYTES NFR BLD AUTO: 13.2 % (ref 19.6–45.3)
MCH RBC QN AUTO: 30 PG (ref 26.6–33)
MCHC RBC AUTO-ENTMCNC: 31.5 G/DL (ref 31.5–35.7)
MCV RBC AUTO: 95.2 FL (ref 79–97)
MICROALBUMIN/CREAT UR: 1428.6 MG/G (ref 0–29)
MONOCYTES # BLD AUTO: 0.62 10*3/MM3 (ref 0.1–0.9)
MONOCYTES NFR BLD AUTO: 6.9 % (ref 5–12)
NEUTROPHILS NFR BLD AUTO: 6.87 10*3/MM3 (ref 1.7–7)
NEUTROPHILS NFR BLD AUTO: 76.5 % (ref 42.7–76)
NRBC BLD AUTO-RTO: 0 /100 WBC (ref 0–0.2)
PLATELET # BLD AUTO: 337 10*3/MM3 (ref 140–450)
PMV BLD AUTO: 10.8 FL (ref 6–12)
POTASSIUM SERPL-SCNC: 4.5 MMOL/L (ref 3.5–5.2)
PROT SERPL-MCNC: 6.7 G/DL (ref 6–8.5)
RBC # BLD AUTO: 3.1 10*6/MM3 (ref 4.14–5.8)
SODIUM SERPL-SCNC: 135 MMOL/L (ref 136–145)
TRIGL SERPL-MCNC: 213 MG/DL (ref 0–150)
TSH SERPL DL<=0.05 MIU/L-ACNC: 2.41 UIU/ML (ref 0.27–4.2)
VLDLC SERPL-MCNC: 34 MG/DL (ref 5–40)
WBC NRBC COR # BLD AUTO: 8.99 10*3/MM3 (ref 3.4–10.8)

## 2024-05-13 ENCOUNTER — TELEPHONE (OUTPATIENT)
Dept: DIABETES SERVICES | Facility: HOSPITAL | Age: 54
End: 2024-05-13
Payer: COMMERCIAL

## 2024-05-13 ENCOUNTER — TELEPHONE (OUTPATIENT)
Dept: CASE MANAGEMENT | Facility: OTHER | Age: 54
End: 2024-05-13
Payer: COMMERCIAL

## 2024-05-13 ENCOUNTER — TELEPHONE (OUTPATIENT)
Dept: INTERNAL MEDICINE | Facility: CLINIC | Age: 54
End: 2024-05-13
Payer: COMMERCIAL

## 2024-05-13 ENCOUNTER — TELEPHONE (OUTPATIENT)
Dept: CARDIOLOGY | Facility: CLINIC | Age: 54
End: 2024-05-13
Payer: COMMERCIAL

## 2024-05-13 DIAGNOSIS — R55 SYNCOPE AND COLLAPSE: Primary | ICD-10-CM

## 2024-05-13 RX ORDER — KETOROLAC TROMETHAMINE 30 MG/ML
1 INJECTION, SOLUTION INTRAMUSCULAR; INTRAVENOUS ONCE
Qty: 1 EACH | Refills: 0 | Status: SHIPPED | OUTPATIENT
Start: 2024-05-13 | End: 2024-05-13

## 2024-05-13 NOTE — TELEPHONE ENCOUNTER
Attempted to contact patient. Left message to call the office back.    HUB OK TO READ/ADVISE:    CMP notable for elevated blood sugar of 297.  Sodium is mildly low but stable.  Renal function is stable, with a GFR of 17.7.     Lipids notable for mildly elevated triglycerides.  I would like to continue your statin.     Urine labs show quite elevated protein in your urine.     CBC notable for mild but stable anemia.     TSH is within normal limits.

## 2024-05-13 NOTE — TELEPHONE ENCOUNTER
Received the following message from Mikayla:  PCP contacted us regarding syncopal event and prolonged QT on EKG, recommending EKG only visit in 2 weeks since they are adjusting his citalopram dose AND recommending 30 day cardiac event monitor/echo to evaluate syncopal event     Patient voiced understanding

## 2024-05-13 NOTE — TELEPHONE ENCOUNTER
I telephoned patient regarding Dr. Lomeli contacting [facility] stating patient had passed out and would like for us to follow-up.  Patient states he was asleep and he hopped out of bed very fast go to the bathroom.  He states he woke up on the bathroom floor.  He did check his blood sugar after he got back to bed and his blood sugar was 68.  Patient states he is not wearing his CGM due to his phone not being compatible with the mimi 3 lorne.  Message was sent to [facility] C[facility]U regarding [employer] 3 reader.  I explained to the patient I sent the message and his pharmacy should contact him when reader is available.  I ask patient to contact our office with any questions or concerns.

## 2024-05-14 ENCOUNTER — HOSPITAL ENCOUNTER (OUTPATIENT)
Dept: ULTRASOUND IMAGING | Facility: HOSPITAL | Age: 54
Discharge: HOME | End: 2024-05-14
Admitting: INTERNAL MEDICINE
Payer: COMMERCIAL

## 2024-05-14 DIAGNOSIS — N18.4 CKD (CHRONIC KIDNEY DISEASE), SYMPTOM MANAGEMENT ONLY, STAGE 4 (SEVERE): ICD-10-CM

## 2024-05-14 PROCEDURE — 76775 US EXAM ABDO BACK WALL LIM: CPT

## 2024-05-15 ENCOUNTER — TELEPHONE (OUTPATIENT)
Dept: DIABETES SERVICES | Facility: HOSPITAL | Age: 54
End: 2024-05-15
Payer: COMMERCIAL

## 2024-05-15 NOTE — TELEPHONE ENCOUNTER
Rogelio Dee (Key: [id number])  Rx #: [id number]  FreeStyle Shawna 3 [place] deviceOutcome  Approved today  The request has been approved. The authorization is effective from 05/15/2024 to 05/15/2025, as long as the member is enrolled in their current health plan. A written notification letter will follow with additional details.  Authorization Expiration Date: 5/14/2025

## 2024-05-16 ENCOUNTER — PATIENT OUTREACH (OUTPATIENT)
Dept: CASE MANAGEMENT | Facility: OTHER | Age: 54
End: 2024-05-16
Payer: COMMERCIAL

## 2024-05-16 DIAGNOSIS — F33.1 MODERATE EPISODE OF RECURRENT MAJOR DEPRESSIVE DISORDER: ICD-10-CM

## 2024-05-16 DIAGNOSIS — E11.3513 TYPE 2 DIABETES MELLITUS WITH BOTH EYES AFFECTED BY PROLIFERATIVE RETINOPATHY AND MACULAR EDEMA, WITH LONG-TERM CURRENT USE OF INSULIN: ICD-10-CM

## 2024-05-16 DIAGNOSIS — N18.4 STAGE 4 CHRONIC KIDNEY DISEASE: Primary | ICD-10-CM

## 2024-05-16 DIAGNOSIS — Z79.4 TYPE 2 DIABETES MELLITUS WITH BOTH EYES AFFECTED BY PROLIFERATIVE RETINOPATHY AND MACULAR EDEMA, WITH LONG-TERM CURRENT USE OF INSULIN: ICD-10-CM

## 2024-05-17 ENCOUNTER — PATIENT OUTREACH (OUTPATIENT)
Age: 54
End: 2024-05-17
Payer: COMMERCIAL

## 2024-05-17 NOTE — OUTREACH NOTE
Patient Outreach    MSW left VM with patient to discuss resources for food and financial assistance.    Skylar TAYLOR -   Ambulatory Case Management    5/17/2024, 16:04 EDT

## 2024-05-20 ENCOUNTER — TELEPHONE (OUTPATIENT)
Dept: PSYCHIATRY | Facility: CLINIC | Age: 54
End: 2024-05-20
Payer: COMMERCIAL

## 2024-05-20 ENCOUNTER — PATIENT OUTREACH (OUTPATIENT)
Age: 54
End: 2024-05-20
Payer: COMMERCIAL

## 2024-05-20 NOTE — TELEPHONE ENCOUNTER
Please phone patient confirm if he has decreased citalopram to 20 mg related to his most recent EKG results.     ECG 12 Lead (05/10/2024 16:37)   ECG Scan (05/10/2024)   Qtc 515

## 2024-05-20 NOTE — TELEPHONE ENCOUNTER
SPOKE WITH PATIENT TO CONFIRM DOSAGE. PATIENT IS TAKING 40MG TABLET EVERYDAY. INFORMED PATIENT THE PROVIDER WOULD LIKE FOR HIM TO CUT THIS IN HALF AND ONLY TAKE 20MG UNTIL INSTRUCTED OTHERWISE. PATIENT AGREED AND HAD NO FURTHER QUESTIONS.     NEXT FOLLOW UP WITH PROVIDER  Appointment with Crystal Carbone APRN (06/14/2024)

## 2024-05-20 NOTE — OUTREACH NOTE
Social Work Assessment  Questions/Answers      Flowsheet Row Most Recent Value   Referral Source physician   People in Home alone   Current Living Arrangements home   In the past 12 months has the electric, gas, oil, or water company threatened to shut off services in your home? No   Primary Care Provided by self   Family Caregiver if Needed parent(s)   Quality of Family Relationships helpful   Source of Income disability   Financial/Environmental Concerns unable to afford food   Application for Public Assistance obtained public assistance pending number          SDOH updated and reviewed with the patient during this program:  --     Disabilities: At Risk (5/16/2024)    Disabilities     Concentrating, Remembering, or Making Decisions Difficulty: yes      --     Employment: Not At Risk (5/20/2024)    Employment     Do you want help finding or keeping work or a job?: I do not need or want help      Financial Resource Strain: High Risk (5/16/2024)    Overall Financial Resource Strain (CARDIA)     Difficulty of Paying Living Expenses: Very hard      --     Food Insecurity: Food Insecurity Present (5/16/2024)    Hunger Vital Sign     Worried About Running Out of Food in the Last Year: Often true     Ran Out of Food in the Last Year: Often true      --     Housing Stability: Not At Risk (5/20/2024)    Housing Stability     Current Living Arrangements: home     Potentially Unsafe Housing Conditions: none      --     Transportation Needs: No Transportation Needs (5/16/2024)    PRAPARE - Transportation     Lack of Transportation (Medical): No     Lack of Transportation (Non-Medical): No      --     Utilities: Not At Risk (5/20/2024)    [facility] Utilities     Threatened with loss of utilities: No     Patient Outreach    MSW spoke with patient to discuss resources needed for food insecurity and utility assistance. Patient states that he couldn't reach SNAP office when applying, so did not re-apply for SNAP.  Patient states that he  could use additional resources for food genao and utility assistance. MSW provided information for utility assistance ([employer], [place], and [employer]) and food.    Care Coordination    MSW sent referral through [facility] for food assistance.    Skylar TAYLOR -   Ambulatory Case Management    5/20/2024, 09:58 EDT

## 2024-05-24 ENCOUNTER — PATIENT OUTREACH (OUTPATIENT)
Dept: CASE MANAGEMENT | Facility: OTHER | Age: 54
End: 2024-05-24
Payer: COMMERCIAL

## 2024-05-24 DIAGNOSIS — E11.3513 TYPE 2 DIABETES MELLITUS WITH BOTH EYES AFFECTED BY PROLIFERATIVE RETINOPATHY AND MACULAR EDEMA, WITH LONG-TERM CURRENT USE OF INSULIN: ICD-10-CM

## 2024-05-24 DIAGNOSIS — Z79.4 TYPE 2 DIABETES MELLITUS WITH BOTH EYES AFFECTED BY PROLIFERATIVE RETINOPATHY AND MACULAR EDEMA, WITH LONG-TERM CURRENT USE OF INSULIN: ICD-10-CM

## 2024-05-24 DIAGNOSIS — N18.4 STAGE 4 CHRONIC KIDNEY DISEASE: Primary | ICD-10-CM
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
## 2024-05-30 RX ORDER — NIFEDIPINE 30 MG/1
60 TABLET, EXTENDED RELEASE ORAL DAILY
Qty: 60 TABLET | Refills: 0 | Status: SHIPPED | OUTPATIENT
Start: 2024-05-30

## 2024-05-31 RX ORDER — NIFEDIPINE 30 MG/1
60 TABLET, EXTENDED RELEASE ORAL DAILY
Qty: 180 TABLET | OUTPATIENT
Start: 2024-05-31

## 2024-06-11 PROBLEM — E11.10 DKA (DIABETIC KETOACIDOSIS): Status: ACTIVE | Noted: 2024-01-01

## 2024-06-11 NOTE — TELEPHONE ENCOUNTER
LEFT MESSAGE TO RESCHEDULE HIS VEIN MAPPING AND NEW PT APPT WITH DR GRAMAJO. TRANSFER TO OUR OFFICE TO SCHEDULE.

## 2024-06-11 NOTE — PLAN OF CARE
Goal Outcome Evaluation:      Patient on continuous bipap, insulin gtt, precedex, bicarb gtt. Patient has HD cath placed and dodge catheter. Patient awake but not oriented.

## 2024-06-11 NOTE — PROCEDURES
CENTRAL LINE PLACEMENT NOTE  Indication: Need for central access  Consent obtained: placed emergently  Time out: performed w/ RN at bedside.     Procedure:   The patient was placed in the supine position and the skin over the patient's internal jugular vein was prepped with chlorhexidine. The patient was draped with full body drape, sterile procedure was used.  The skin was infiltrated with local anesthesia over the insertion site with 5 mL of 1% lidocaine under ultrasound guidance.  Large-bore needle was used to cannulate the vessel with return of dark blood.  The guidewire was inserted into the needle using the Seldinger technique and then needle was removed. Ultrasound was used to confirm the wire was in the vein. A small nick was made in the skin & vessel was dilated w/ dilator. A triple-lumen catheter was then placed into the vessel over the wire & wire was removed. All 3 ports freely flush & draw.  The catheter was securely fastened to the skin with suture. Next a sterile dressing was placed & a CXR ordered to confirm positioning of the line.     The patient tolerated the procedure well  Complications: no immediate complications noted      Procedure name: Ultrasound guidance for central line placement  Ultrasound was used to confirm cannulation of the right internal jugular vein with the finder needle during central line placement. Vessel was reviewed with ultrasound prior to procedure and was patent and lied just next to the right carotid artery. Ultrasound was used to assure correct placement of cannulating needle and picture was taken of guidewire being in correct placement prior to dilating vessel and image was uploaded to patient's electronic medical record.     Electronically signed by ASHLYN Mueller, 06/11/24, 6:17 PM EDT.

## 2024-06-11 NOTE — PROGRESS NOTES
"[facility]: Vancomycin Pharmacokinetic Initial Consult Note    Rogelio Dee is a 54 y.o. male who is on day 1 of pharmacy to dose vancomycin for Sepsis.    Consult Information  Consulting Provider: Seble Loredo  Planned Duration of Therapy: 5 days  Was Patient Receiving Prior to Admission/Consult?: No  Loading Dose Given: 1500 mg on  at 1532  PK/PD Target: Dose by Levels  Relevant ID History:   Other Antimicrobials: Cefepime    Imaging Reviewed?: Yes    Microbiology Data  MRSA PCR performed: No; Result: Not ordered due to excluded indication or presence of suspected abscess  Culture/Source:    blood pending    Vitals/Labs  Ht: 172.7 cm (68\"); Wt: 72.5 kg (159 lb 13.3 oz)  Temp (24hrs), Av.2 °F (37.9 °C), Min:100 °F (37.8 °C), Max:100.3 °F (37.9 °C)   Estimated Creatinine Clearance: 17.4 mL/min (A) (by C-G formula based on SCr of 5.03 mg/dL (H)).       Results from last 7 days   Lab Units 24  1517 24  1232   CREATININE mg/dL  --  5.03*   WBC 10*3/mm3 35.06* 38.57*     Assessment/Plan:    Vancomycin Dose: pulse dosing with 1500 mg IV once on  at 1532 ;   Vanc Random ordered for 6/12 AM  Patient has order for Basic Metabolic Panel    Pharmacy will follow patient's kidney function and will adjust doses and obtain levels as necessary. Thank you for involving pharmacy in this patient's care. Please contact pharmacy with any questions or concerns.                           Elijah Epstein, [facility]  Clinical Pharmacist    "

## 2024-06-11 NOTE — ED PROVIDER NOTES
"Time: 12:19 PM EDT  Date of encounter:  6/11/2024  Independent Historian/Clinical History and Information was obtained by:   Family and EMS    History is limited by: Altered Mental Status    Chief Complaint: Altered mental status      History of Present Illness:  Patient is a 54 y.o. year old male who presents to the emergency department for evaluation of altered mental status.  Patient is alert but not really responding to questions.  He is hyperventilating and is tachycardic.  His mother arrived and states he has been like this once before and states he was acidotic with that episode.  Patient is diabetic but his blood sugars in the 220s on arrival.  Patient is urinated on himself and is agitated.    HPI    Patient Care Team  Primary Care Provider: Simeon Lomeli MD    Past Medical History:     Allergies   Allergen Reactions    Kayexalate [Sodium Polystyrene Sulfonate] GI Intolerance     \"Can not make it to the bathroom fast enough\"    Oxycodone Mental Status Change and Other (See Comments)     Past Medical History:   Diagnosis Date    Blindness of left eye     Chronic diastolic CHF 07/15/2021    Chronic pain syndrome     Diabetes mellitus, type 2     Diabetic neuropathy 07/15/2021    Hyperlipidemia     Hypertension     Lumbosacral disc herniation     L5-S1    Metabolic acidosis     Smoker 07/15/2021    Stage 4 CKD 07/15/2021    Status post amputation of great toe, left     Status post amputation of great toe, right     Type 2 DM with proliferative retinopathy with macular edema, bilateral 03/24/2021    PDR OD - New [facility] since last visit.  Missed appt in [place] for PRP, partially because of ride issues and partially because he is hesitant to undergo PRP because it is uncomfortable.  Avastin today.  Discussed retrobulbar for PRP vs. Treatment with anti-VEGF exclusively.  Pt will consider options.  If doing PRP, will try to get done in 1 session in order to minimize trips to [place].  PDR OS -     Past " Surgical History:   Procedure Laterality Date    LUMBAR DISC SURGERY      TOE AMPUTATION       Family History   Problem Relation Age of Onset    Diabetes Mother     Diabetes Paternal Grandmother        Home Medications:  Prior to Admission medications    Medication Sig Start Date End Date Taking? Authorizing Provider   aspirin 81 MG EC tablet TAKE 1 TABLET BY MOUTH EVERY DAY 11/7/23   Simeon Lomeli MD   atorvastatin (LIPITOR) 20 MG tablet TAKE 1 TABLET BY MOUTH DAILY 12/19/23   Simeon Lomeli MD   Blood Glucose Monitoring Suppl ([employer]ocate Blood Glucose Monitor) device Use 1 Units 4 (Four) Times a Day Before Meals & at Bedtime. 2/7/24   Simeon Lomeli MD   Blood Glucose Monitoring Suppl (FreeStyle Lite) w/Device kit by Other route 4 (Four) Times a Day. use to test blood sugar 2/7/24   Mandy Perla MD   Cariprazine HCl (Vraylar) 1.5 MG capsule capsule Take 1 capsule by mouth Daily. 5/9/24   Crystal Carbone APRN   carvedilol (COREG) 25 MG tablet TAKE 1 TABLET BY MOUTH TWICE DAILY WITH MEALS 11/7/23   Simeon Lomeli MD   Cholecalciferol (Vitamin D3) 50 MCG (2000 UT) capsule TAKE 1 CAPSULE BY MOUTH EVERY DAY 4/30/24   Simeon Lomeli MD   citalopram (CeleXA) 40 MG tablet TAKE 1 TABLET BY MOUTH DAILY 9/29/23   Simeon Lomeli MD   cloNIDine (CATAPRES) 0.1 MG tablet Take 2 tablets by mouth 2 (Two) Times a Day. 2/7/24   Simeon Lomeli MD   Continuous Blood Gluc Sensor (FreeStyle Shawna 3 Sensor) [facility] [place] 1 each Every 14 (Fourteen) Days. 4/2/24   Ira Larson APRN   cyclobenzaprine (FLEXERIL) 10 MG tablet Every 8 (Eight) Hours. 3/3/21   Mandy Perla MD   dorzolamide-timolol (Cosopt) 2-0.5 % ophthalmic solution Administer 1 drop to both eyes 2 (Two) Times a Day. 3/8/24   Simeon Lomeli MD   ferrous sulfate 325 (65 FE) MG tablet FeroSul 325 mg (65 mg iron) tablet   TAKE 1 TABLET BY MOUTH TWICE DAILY    Mandy Perla MD   furosemide (LASIX) 40 MG tablet TAKE 1 TABLET BY  MOUTH DAILY 11/7/23   Simeon Lomeli MD   glucose blood test strip Use as instructed 2/7/24   Simeon Lomeli MD   hydrALAZINE (APRESOLINE) 100 MG tablet TAKE 1 TABLET BY MOUTH THREE TIMES DAILY 9/29/23   Simeon Lomeli MD   HYDROcodone-acetaminophen (NORCO) 7.5-325 MG per tablet Every 6 (Six) Hours. 3/3/21   Mandy Perla MD   insulin degludec (TRESIBA FLEXTOUCH) 100 UNIT/ML solution pen-injector injection Inject 10 Units under the skin into the appropriate area as directed Daily. Indications: Type 2 Diabetes    Provider, MD Mandy   Lancets (freestyle) lancets Check blood sugar every morning (AM fasting) as well as before lunch and dinner and before bedtime 2/7/24   Simeon Lomeli MD   nicotine polacrilex (NICORETTE) 4 MG gum Chew 1 each As Needed for Smoking Cessation. 5/10/24   Simeon Lomeli MD   NIFEdipine XL (PROCARDIA XL) 30 MG 24 hr tablet TAKE 2 TABLETS BY MOUTH EVERY DAY 5/30/24   Mikayla Schilling APRN   omeprazole (priLOSEC) 40 MG capsule TAKE 1 CAPSULE BY MOUTH DAILY 12/22/23   Simeon Lomeli MD   rOPINIRole (REQUIP) 0.25 MG tablet TAKE 1 TABLET BY MOUTH EVERY NIGHT 1 HOUR BEFORE BEDTIME 11/7/23   Simeon Lomeli MD   sodium bicarbonate 650 MG tablet Take 1 tablet by mouth 3 (Three) Times a Day. 3/18/24   Simeon Lomeli MD   tamsulosin (FLOMAX) 0.4 MG capsule 24 hr capsule TAKE 1 CAPSULE BY MOUTH DAILY 9/29/23   Simeon Lomeli MD        Social History:   Social History     Tobacco Use    Smoking status: Every Day     Current packs/day: 0.50     Average packs/day: 0.5 packs/day for 30.0 years (15.0 ttl pk-yrs)     Types: Cigarettes     Passive exposure: Current    Smokeless tobacco: Never    Tobacco comments:     10 cigs a day   Vaping Use    Vaping status: Former    Substances: Nicotine    Passive vaping exposure: Yes   Substance Use Topics    Alcohol use: Never    Drug use: Never         Review of Systems:  Review of Systems   Unable to perform ROS: Patient  "unresponsive        Physical Exam:  /62   Pulse 112   Temp 100.3 °F (37.9 °C) (Rectal)   Resp 28   Ht 172.7 cm (68\")   Wt 72.5 kg (159 lb 13.3 oz)   SpO2 96%   BMI 24.30 kg/m²     Physical Exam  Vitals and nursing note reviewed.   Constitutional:       General: He is not in acute distress.     Comments: Patient not responding to questions or following commands.  No obvious signs of trauma.   HENT:      Head: Normocephalic and atraumatic.   Eyes:      Pupils: Pupils are equal, round, and reactive to light.   Cardiovascular:      Rate and Rhythm: Regular rhythm. Tachycardia present.   Pulmonary:      Effort: Respiratory distress present.      Breath sounds: Normal breath sounds.      Comments: Tachypneic  Abdominal:      General: Abdomen is flat.      Palpations: Abdomen is soft.      Tenderness: There is no abdominal tenderness.   Musculoskeletal:         General: Normal range of motion.      Cervical back: Normal range of motion.      Comments: Patient's left foot has a previous great toe amputation in the distal foot is swollen with erythema and healing ulceration on the ball of the foot   Skin:     General: Skin is warm and dry.   Neurological:      Mental Status: He is alert.      GCS: GCS eye subscore is 4. GCS verbal subscore is 2. GCS motor subscore is 5.   Psychiatric:         Behavior: Behavior is agitated.                  Procedures:  Procedures      Medical Decision Making:      Comorbidities that affect care:    Chronic Kidney Disease, Diabetes, Hypertension    External Notes reviewed:    {External Note review (Optional):57820}      The following orders were placed and all results were independently analyzed by me:  Orders Placed This Encounter   Procedures    Blood Culture - Blood,    Blood Culture - Blood,    XR Chest 1 View    CT Head Without Contrast    XR Foot 2 View Left    [place] Draw    Comprehensive Metabolic Panel    Single High Sensitivity Troponin T    Magnesium    Urinalysis " With Microscopic If Indicated (No Culture) - Urine, Clean Catch    CBC Auto Differential    Lactic Acid, Plasma    Acetone    Arterial Blood Gas + Electrolytes    Scan Slide    Blood Gas, Arterial -    Urinalysis, Microscopic Only - Urine, Clean Catch    Manual Differential    STAT Lactic Acid, Reflex    High Sensitivity Troponin T 2Hr    NPO Diet NPO Type: Strict NPO    Undress & Gown    Continuous Pulse Oximetry    Vital Signs    Inpatient Hospitalist Consult    Oxygen Therapy- Nasal Cannula; Titrate 1-6 LPM Per SpO2; 90 - 95%    POC Glucose Once    POC Lactate    POC Electrolyte Panel    ECG 12 Lead ED Triage Standing Order; Weak / Dizzy / AMS    Insert Peripheral IV    Inpatient Admission    Fall Precautions    CBC & Differential    Green Top (Gel)    Lavender Top    Gold Top - SST    Light Blue Top       Medications Given in the Emergency Department:  Medications   sodium chloride 0.9 % flush 10 mL (has no administration in time range)   AZITHROMYCIN 500 MG/250 ML 0.9% NS IVPB (vial-mate) (500 mg Intravenous New Bag 6/11/24 1358)   vancomycin IVPB 1500 mg in 0.9% NaCl (Premix) 500 mL (has no administration in time range)   sodium chloride 0.9 % bolus 2,175 mL (0 mL Intravenous Stopped 6/11/24 1358)   LORazepam (ATIVAN) injection 1 mg (1 mg Intravenous Given 6/11/24 1235)   cefTRIAXone (ROCEPHIN) 2,000 mg in sodium chloride 0.9 % 100 mL IVPB-VTB (0 mg Intravenous Stopped 6/11/24 1329)        ED Course:         Labs:    Lab Results (last 24 hours)       Procedure Component Value Units Date/Time    CBC & Differential [[id number]]  (Abnormal) Collected: 06/11/24 1232    Specimen: Blood Updated: 06/11/24 1337    Narrative:      The following orders were created for panel order CBC & Differential.  Procedure                               Abnormality         Status                     ---------                               -----------         ------                     CBC Auto Differential[[id number]]        Abnormal             Final result               Scan Slide[[id number]]                                       Final result                 Please view results for these tests on the individual orders.    Comprehensive Metabolic Panel [[id number]]  (Abnormal) Collected: 06/11/24 1232    Specimen: Blood Updated: 06/11/24 1324     Glucose 222 mg/dL      BUN 65 mg/dL      Creatinine 5.03 mg/dL      Sodium 130 mmol/L      Potassium 3.7 mmol/L      Chloride 92 mmol/L      CO2 9.3 mmol/L      Calcium 9.3 mg/dL      Total Protein 7.1 g/dL      Albumin 3.1 g/dL      ALT (SGPT) 8 U/L      AST (SGOT) 28 U/L      Alkaline Phosphatase 186 U/L      Total Bilirubin 0.4 mg/dL      Globulin 4.0 gm/dL      A/G Ratio 0.8 g/dL      BUN/Creatinine Ratio 12.9     Anion Gap 28.7 mmol/L      eGFR 13.0 mL/min/1.73      Comment: <15 Indicative of kidney failure       Narrative:      GFR Normal >60  Chronic Kidney Disease <60  Kidney Failure <15      Single High Sensitivity Troponin T [[id number]]  (Abnormal) Collected: 06/11/24 1232    Specimen: Blood Updated: 06/11/24 1324     HS Troponin T 444 ng/L     Narrative:      High Sensitive Troponin T Reference Range:  <14.0 ng/L- Negative Female for AMI  <22.0 ng/L- Negative Male for AMI  >=14 - Abnormal Female indicating possible myocardial injury.  >=22 - Abnormal Male indicating possible myocardial injury.   Clinicians would have to utilize clinical acumen, EKG, Troponin, and serial changes to determine if it is an Acute Myocardial Infarction or myocardial injury due to an underlying chronic condition.         Magnesium [[id number]]  (Normal) Collected: 06/11/24 1232    Specimen: Blood Updated: 06/11/24 1307     Magnesium 1.7 mg/dL     Urinalysis With Microscopic If Indicated (No Culture) - Urine, Clean Catch [[id number]]  (Abnormal) Collected: 06/11/24 1232    Specimen: Urine, Clean Catch Updated: 06/11/24 1256     Color, UA Yellow     Appearance, UA Clear     pH, UA 5.5     Specific [place], UA 1.014      Glucose,  mg/dL (1+)     Ketones, UA 15 mg/dL (1+)     Bilirubin, UA Negative     Blood, UA Negative     Protein, UA >=300 mg/dL (3+)     Leuk Esterase, UA Negative     Nitrite, UA Negative     Urobilinogen, UA 0.2 E.U./dL    CBC Auto Differential [[id number]]  (Abnormal) Collected: 06/11/24 1232    Specimen: Blood Updated: 06/11/24 1337     WBC 38.57 10*3/mm3      RBC 2.67 10*6/mm3      Hemoglobin 7.8 g/dL      Hematocrit 24.7 %      MCV 92.5 fL      MCH 29.2 pg      MCHC 31.6 g/dL      RDW 13.2 %      RDW-SD 45.3 fl      MPV 9.9 fL      Platelets 534 10*3/mm3     Narrative:      The previously reported component NRBC is no longer being reported. Previous result was 0.1 /100 WBC (Reference Range: 0.0-0.2 /100 WBC) on 6/11/2024 at 1254 EDT.    Blood Culture - Blood, Arm, Right [[id number]] Collected: 06/11/24 1232    Specimen: Blood from Arm, Right Updated: 06/11/24 1239    Lactic Acid, Plasma [[id number]]  (Abnormal) Collected: 06/11/24 1232    Specimen: Blood Updated: 06/11/24 1324     Lactate 2.3 mmol/L     Acetone [[id number]]  (Normal) Collected: 06/11/24 1232    Specimen: Blood Updated: 06/11/24 1248     Acetone Negative    Scan Slide [[id number]] Collected: 06/11/24 1232    Specimen: Blood Updated: 06/11/24 1337     Scan Slide --     Comment: See Manual Differential Results       Urinalysis, Microscopic Only - Urine, Clean Catch [[id number]]  (Abnormal) Collected: 06/11/24 1232    Specimen: Urine, Clean Catch Updated: 06/11/24 1303     RBC, UA 0-2 /HPF      WBC, UA 3-5 /HPF      Bacteria, UA Trace /HPF      Squamous Epithelial Cells, UA 0-2 /HPF      Hyaline Casts, UA None Seen /LPF      Methodology Manual Light Microscopy    Manual Differential [[id number]]  (Abnormal) Collected: 06/11/24 1232    Specimen: Blood Updated: 06/11/24 1337     Neutrophil % 89.0 %      Lymphocyte % 1.0 %      Monocyte % 3.0 %      Bands %  4.0 %      Metamyelocyte % 1.0 %      Promyelocyte % 2.0 %      Neutrophils Absolute 35.87  10*3/mm3      Lymphocytes Absolute 0.39 10*3/mm3      Monocytes Absolute 1.16 10*3/mm3      Anisocytosis Slight/1+     [place] Cells --     Comment: Observed          Hypochromia Slight/1+     Microcytes --     Comment: Observed          Poikilocytes Slight/1+     Polychromasia --     Comment: Observed          WBC Morphology Normal     Hypersegmented Neutrophils --     Comment: Observed          Platelet Estimate Increased     Clumped Platelets --     Comment: Observed          Large Platelets Slight/1+     Comment:           Giant Platelets --     Comment: Observed         POC Glucose Once [[id number]]  (Abnormal) Collected: 06/11/24 1242    Specimen: Blood Updated: 06/11/24 1245     Glucose 232 mg/dL      Comment: Serial Number: [id number]:  [id number]       Blood Gas, Arterial - [[id number]]  (Abnormal) Collected: 06/11/24 1242    Specimen: Arterial Blood Updated: 06/11/24 1245     Site Right Radial     Noam's Test Positive     pH, Arterial 7.350 pH units      pCO2, Arterial 14.9 mm Hg      pO2, Arterial 95.8 mm Hg      HCO3, Arterial 8.2 mmol/L      Base Excess, Arterial -15.6 mmol/L      Comment: Serial Number: [id number]:  [id number]        O2 Saturation, Arterial 97.4 %      Hemoglobin, Blood Gas 7.6 g/dL      Hematocrit, Blood Gas 22.0 %      Barometric Pressure for Blood Gas 744.7000 mmHg      Modality Room Air     Notified Who dr reddy     Read Back Yes     Notified Time --     Hemodilution No    POC Lactate [[id number]]  (Normal) Collected: 06/11/24 1242    Specimen: Arterial Blood Updated: 06/11/24 1245     Lactate 1.5 mmol/L      Comment: Serial Number: [id number]:  [id number]       POC Electrolyte Panel [[id number]]  (Abnormal) Collected: 06/11/24 1242    Specimen: Arterial Blood Updated: 06/11/24 1245     Sodium 129 mmol/L      POC Potassium 3.8 mmol/L      Chloride 106 mmol/L      Ionized Calcium 1.09 mmol/L      Comment: Serial Number: [id number]:  [id number]                Imaging:    XR Foot 2 View  Left    Result Date: 6/11/2024  XR FOOT 2 VW LEFT Date of Exam: 6/11/2024 1:45 PM EDT Indication: Cellulitis distal foot pain Comparison: None available. Findings: There is a soft tissue ulcer along the plantar aspect of the medial left foot. 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 There is no osseous erosion at the distal aspect of the metatarsal amputation site. There are chronic appearing changes at the distal second metatarsal and proximal phalanx of the second digit with callus formation. This likely relates to old trauma or infection. There is no new osseous erosion.     Impression: 1. Soft tissue ulceration along the plantar aspect of the medial left foot. No osseous erosion to suggest radiographic osteomyelitis. 2. Chronic appearing changes at the distal second metatarsal and proximal phalanx of the second digit likely related to old trauma or old infection. Electronically Signed: Elias Boland  6/11/2024 2:24 PM EDT  Workstation ID: [id number]    CT Head Without Contrast    Result Date: 6/11/2024  CT HEAD WO CONTRAST Date of Exam: 6/11/2024 1:47 PM EDT Indication: Altered mental status headache. Comparison: CT brain dated 9/12/2019 Technique: Axial CT images were obtained of the head without contrast administration.  Reconstructed coronal and sagittal images were also obtained. Automated exposure control and iterative construction methods were used. Findings: There is motion artifact limiting the exam There is no evidence of hemorrhage. There is no mass effect or midline shift mild diffuse brain atrophy. There is no extracerebral collection. No change in tiny round hyperdensity at the level of the roof of the third ventricle could represent a tiny colloid cyst or calcification Ventricles are normal in size and configuration for patient's stated age. Posterior fossa is within normal limits. Calvarium and skull base appear intact.  Visualized sinuses show no air fluid levels. Visualized  orbits are unremarkable.     Impression: No evidence of acute intracranial abnormality Electronically Signed: Leo Figueredo MD  6/11/2024 2:21 PM EDT  Workstation ID: [id number]    XR Chest 1 View    Result Date: 6/11/2024  XR CHEST 1 VW Date of Exam: 6/11/2024 1:04 PM EDT Indication: Weak/Dizzy/AMS triage protocol Comparison: May 16, 2022 Findings: The heart is enlarged. There is no nubia consolidation or obvious pleural effusions.     Impression: 1.Cardiomegaly suggested. Electronically Signed: Memo Werner MD  6/11/2024 1:32 PM EDT  Workstation ID: [id number]       Differential Diagnosis and Discussion:    Altered Mental Status: Based on the patient's signs and symptoms, differential diagnosis includes but is not limited to meningitis, stroke, sepsis, subarachnoid hemorrhage, intracranial bleeding, encephalitis, and metabolic encephalopathy.    {Independent Review of (Optional):72532}    [facility]           Sepsis criteria was met in the emergency department and the Sepsis protocol (including antibiotic administration) was initiated.      SIRS criteria considered:   1.  Temperature > 100.4 or <96.8    2.  Heart Rate > 90    3.  Respiratory Rate > 22    4.  WBC > 12K or <4K.             Severe Sepsis:     Respiratory: Mechanical Ventilation or Bipap  Hypotension: SBP > 90 or MAP < 65  Renal: Creatinine > 2  Metabolic: Lactic Acid > 2  Hematologic: Platelets < 100K or INR > 1.5  Hepatic: BILI  >  2  CNS: Sudden AMS     Septic Shock:     Severe Sepsis + Persistent hypotension or Lactic Acid > 4     Normal saline bolus, Antibiotics, and final disposition was based on these definitions.        Sepsis was recognized at 12:40    Antibiotics were ordered.     30 cc/kg bolus indicated.           Total Critical Care time of 45 minutes. Total critical care time documented does not include time spent on separately billed procedures for services of nurses or physician assistants. I personally saw and examined the patient. I have  reviewed all diagnostic interpretations and treatment plans as written. I was present for the key portions of any procedures performed and the inclusive time noted in any critical care statement. Critical care time includes patient management by me, time spent at the patients bedside,  time to review lab and imaging results, discussing patient care, documentation in the medical record, and time spent with family or caregiver.    Patient Care Considerations:          Consultants/Shared Management Plan:    Hospitalist: I have discussed the case with Dr. Cook who agrees to accept the patient for admission.    Patient discussed with Dr. Alexandra, critical care, who will consult on the patient who is being admitted to the ICU.  Social Determinants of Health:    Patient is independent, reliable, and has access to care.       Disposition and Care Coordination:    Admit:   Through independent evaluation of the patient's history, physical, and imperical data, the patient meets criteria for inpatient admission to the hospital.      Final diagnoses:   Sepsis with acute renal failure, due to unspecified organism, unspecified acute renal failure type, unspecified whether septic shock present   Cellulitis of left foot   Acute renal failure superimposed on chronic kidney disease, unspecified acute renal failure type, unspecified CKD stage        ED Disposition       ED Disposition   Decision to Admit    Condition   --    Comment   Level of Care: Critical Care [6]   Diagnosis: DKA (diabetic ketoacidosis) [898393]   Admitting Physician: TERESA COOK [[id number]]   Attending Physician: TERESA COOK [[id number]]   Certification: I Certify That Inpatient Hospital Services Are Medically Necessary For Greater Than 2 Midnights                 This medical record created using voice recognition software.           Care [6]   Diagnosis: DKA (diabetic ketoacidosis) [208219]   Admitting Physician: TERESA COOK [[id number]]   Attending Physician: TERESA COOK [[id number]]   Certification: I Certify That Inpatient Hospital Services Are Medically Necessary For Greater Than 2 Midnights                 This medical record created using voice recognition software.             Ashok Shepard DO  06/21/24 1555

## 2024-06-11 NOTE — PAYOR COMM NOTE
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 chloride 0.9 % flush 10 mL  As Needed         06/11/24 1223    Unscheduled  POC Glucose PRN  As Needed      Comments: [facility] Recommended POC Glucose Testing Will Vary Between Every 15 Minutes & Every 2 Hours Release PRN POC Glucose Orders as Needed      06/11/24 1442    Unscheduled  Treat Hypoglycemia As Recommended By [facility]™ & Notify Provider of Treatment  As Needed      Comments: Follow Hypoglycemia Orders As Outlined in Process Instructions (Open Order Report to View Full Instructions)  Notify Provider Any Time Hypoglycemia Treatment is Administered    06/11/24 1442    Unscheduled  If Insulin Infusion is Paused - Follow [facility] Instructions  As Needed       06/11/24 1442    Signed and Held  Vital Signs Every Hour and Per Hospital Policy Based on Patient Condition  Every Hour       Signed and Held    Signed and Held  Continuous Cardiac Monitoring  Continuous        Comments: Follow Standing Orders As Outlined in Process Instructions (Open Order Report to View Full Instructions)    Signed and Held    Signed and Held  nitroglycerin (NITROSTAT) SL tablet 0.4 mg  Every 5 Minutes PRN         Signed and Held    Signed and Held  Maintain IV Access  Continuous          "Signed and Held    Signed and Held  Telemetry - Place Orders & Notify Provider of Results When Patient Experiences Acute Chest Pain, Dysrhythmia or Respiratory Distress  Continuous        Comments: Open Order Report to View Parameters Requiring Provider Notification    Signed and Held    Signed and Held  Continuous Pulse Oximetry  Continuous         Signed and Held    Signed and Held  Height & Weight  Once         Signed and Held    Signed and Held  Daily Weights  Daily       Signed and Held    Signed and Held  Intake & Output  Every Hour       Signed and Held    Signed and Held  Oral Care - Patient Not on NPPV & Not Intubated  Every Shift       Signed and Held    Signed and Held  Target Arousal Level RASS 0 to -2  Continuous         Signed and Held    Signed and Held  Use Mobility Guidelines for Advancement of Activity  Continuous         Signed and Held    Signed and Held  Insert Peripheral IV  Once         Signed and Held    Signed and Held  Saline Lock & Maintain IV Access  Continuous         Signed and Held    Signed and Held  sodium chloride 0.9 % flush 10 mL  Every 12 Hours Scheduled         Signed and Held    Signed and Held  sodium chloride 0.9 % flush 10 mL  As Needed         Signed and Held    Signed and Held  sodium chloride 0.9 % infusion 40 mL  As Needed         Signed and Held    Signed and Held  sennosides-docusate (PERICOLACE) 8.6-50 MG per tablet 2 tablet  2 Times Daily        Placed in \"And\" Linked Group    Signed and Held    Signed and Held  polyethylene glycol (MIRALAX) packet 17 g  Daily PRN        Placed in \"And\" Linked Group    Signed and Held    Signed and Held  bisacodyl (DULCOLAX) EC tablet 5 mg  Daily PRN        Placed in \"And\" Linked Group    Signed and Held    Signed and Held  bisacodyl (DULCOLAX) suppository 10 mg  Daily PRN        Placed in \"And\" Linked Group    Signed and Held    Signed and Held  heparin (porcine) 5000 UNIT/ML injection 5,000 Units  Every 8 Hours Scheduled         " Signed and Held    Signed and Held  PT Consult: Eval & Treat As Tolerated; Discharge Placement Assessment  Once         Signed and Held    Signed and Held  OT Consult: Eval & Treat ADL Performance Below Baseline  Once         Signed and Held    Signed and Held  SLP Consult: Eval & Treat Swallow Disorder  Once         Signed and Held    Signed and Held  CBC & Differential  Daily       Signed and Held    Signed and Held  Iron Profile  Once         Signed and Held    Signed and Held  Ferritin  Once         Signed and Held    Signed and Held  Reticulocytes  Once         Signed and Held    Signed and Held  Vitamin B12  Once         Signed and Held    Signed and Held  Folate  Once         Signed and Held    Signed and Held  Occult Blood, Fecal By Immunoassay - Stool, Per Rectum  Once         Signed and Held    Signed and Held  MRI Foot Left Without Contrast  1 Time Imaging         Signed and Held    Signed and Held  Inpatient Nephrology Consult  Once        Specialty:  Nephrology  Provider:  Caden Rivero MD    Signed and Held    Signed and Held  Pharmacy to Dose Cefepime  Continuous PRN         Signed and Held    Signed and Held  Pharmacy to dose vancomycin  Continuous PRN         Signed and Held    Signed and Held  Duplex Venous Lower Extremity - Bilateral CV-READ  Once         Signed and Held    Signed and Held  Duplex Lower Extremity Art / Grafts - Left CAR  Once         Signed and Held    Signed and Held  dexmedetomidine (PRECEDEX) 400 mcg in 100 mL NS infusion  Titrated         Signed and Held    Signed and Held  norepinephrine (LEVOPHED) 8 mg in 250 mL NS infusion (premix)  Titrated         Signed and Held                  Physician Progress Notes (last 24 hours)  Notes from 06/10/24 1528 through 06/11/24 1528   No notes of this type exist for this encounter.       Consult Notes (last 24 hours)  Notes from 06/10/24 1528 through 06/11/24 1528   No notes of this type exist for this encounter.

## 2024-06-11 NOTE — CONSULTS
[facility]   Consult Note    Patient Name: Rogelio Dee  : 1970  MRN: [id number]  Primary Care Physician:  Simeon Lomeli MD  Referring Physician: No ref. provider found  Date of admission: 2024    Inpatient Pulmonology Consult  Consult performed by: Feliz Alexandra DO  Consult ordered by: Seble Loredo DO        Subjective   Subjective     Reason for Consult/ Chief Complaint: Sepsis    History of Present Illness  Rogelio Dee is a 54 y.o. male who [place]  Patient unable give history due to mental status  According to history given by his father to talk to him yesterday he was complaining of having chills  Today they called and spoke with him he was obtunded  Brought into the ER  Found to be febrile  Found to have leukocytosis  Found to have worsening renal failure and profound acidosis    Review of Systems   Unable to obtain any history from the patient at this time  Personal History     Past Medical History:   Diagnosis Date    Blindness of left eye     Chronic diastolic CHF 07/15/2021    Chronic pain syndrome     Diabetes mellitus, type 2     Diabetic neuropathy 07/15/2021    Hyperlipidemia     Hypertension     Lumbosacral disc herniation     L5-S1    Metabolic acidosis     Smoker 07/15/2021    Stage 4 CKD 07/15/2021    Status post amputation of great toe, left     Status post amputation of great toe, right     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  Missed appt in [place] for PRP, partially because of ride issues and partially because he is hesitant to undergo PRP because it is uncomfortable.  Avastin today.  Discussed retrobulbar for PRP vs. Treatment with anti-VEGF exclusively.  Pt will consider options.  If doing PRP, will try to get done in 1 session in order to minimize trips to [place].  PDR OS -       Past Surgical History:   Procedure Laterality Date    LUMBAR DISC SURGERY       "TOE AMPUTATION         Family History: family history includes Diabetes in his mother and paternal grandmother. Otherwise pertinent FHx was reviewed and not pertinent to current issue.    Social History:  reports that he has been smoking cigarettes. He has a 15 pack-year smoking history. He has been exposed to tobacco smoke. He has never used smokeless tobacco. He reports that he does not drink alcohol and does not use drugs.    Home Medications:   [facility] Blood Glucose Monitor, Cariprazine HCl, FreeStyle Shawna 3 Sensor, FreeStyle Lite, HYDROcodone-acetaminophen, NIFEdipine XL, Vitamin D3, aspirin, atorvastatin, carvedilol, citalopram, cloNIDine, cyclobenzaprine, dorzolamide-timolol, ferrous sulfate, freestyle, furosemide, glucose blood, hydrALAZINE, insulin degludec, nicotine polacrilex, omeprazole, rOPINIRole, sodium bicarbonate, and tamsulosin    Allergies:  Allergies   Allergen Reactions    Kayexalate [Sodium Polystyrene Sulfonate] GI Intolerance     \"Can not make it to the bathroom fast enough\"    Oxycodone Mental Status Change and Other (See Comments)       Objective    Objective     Vitals:  Temp:  [100 °F (37.8 °C)-100.3 °F (37.9 °C)] 100 °F (37.8 °C)  Heart Rate:  [107-125] 108  Resp:  [26-32] 30  BP: (103-152)/(58-96) 103/58    Physical Exam  Ill-appearing male  Appears much older than stated age  He is tachypneic  He is tachycardic  He does have fever  He does have very putrid smell in the room  The left foot has very deep tunneling wounds that are necrotic  Result Review    Result Review:  I have personally reviewed the results from the time of this admission to 6/11/2024 15:47 EDT and agree with these findings:  [x]  Laboratory  [x]  Microbiology  [x]  Radiology  [x]  EKG/Telemetry   []  Cardiology/Vascular   []  Pathology  []  Old records  []  Other:    Most notable findings include: Laboratory data showing severe acidosis    Assessment & Plan   Assessment / Plan     Brief Patient Summary:  Rogelio KELLER" Luiz is a 54 y.o. male who critically ill with sepsis    Active Hospital Problems:  Active Hospital Problems    Diagnosis     **DKA (diabetic ketoacidosis)    Chronic kidney failure  Anion gap metabolic acidosis  Sepsis  Altered mental status/encephalopathy secondary to sepsis  Anemia likely anemia of chronic disease  Diabetic foot wound/infection    Plan:   Patient very encephalopathic at this time  Has high likelihood of needing intubation  Will start patient on bicarbonate drip  Will place hemodialysis/triple-lumen catheter  DKA protocol with insulin drip started  Recommend antibiotics with vancomycin and pip-tazo  Personally called and spoke with Dr. Farias the patient's podiatrist as there is concern that the patient could have gangrenous fluid as the fluid smells terrible  I am concerned that the patient will actually need an amputation  Patient critically ill with sepsis, worsening renal failure, profound acidosis, DKA  Total critical care time spent 42 minutes  Electronically signed by Feliz Alexandra DO, 06/11/24, 3:47 PM EDT.

## 2024-06-11 NOTE — H&P
[facility]  Date: 2024   Patient Name: Rogelio Dee  : 1970  MRN: [id number]  Primary Care Physician:  Simeon Lomeli MD  Date of admission: 2024    Subjective altered mental status  Subjective   Chief Complaint: Altered mental status    HPI: Patient is a 54-year-old man that presents with altered mental status to the emergency room.  Patient is septic.  Unable to get any history from him.    On arrival to the ED, patient temperature 100.3, pulse 125, respiratory rate of 32, blood pressure 152/96 and then subsequent blood pressures trended down, SpO2 97%.    CT head was normal.      X-ray of his foot shows: Soft tissue ulceration on the plantar surface of the medial foot.  No osseous erosions to suggest radiographic osteomyelitis.      Chronic appearing changes of the distal second metatarsal and proximal phalanx of the second digit is likely related to trauma or old infection.      Chest x-ray shows cardiomegaly.    AB.3/14.9/95.8/8.2.  Troponin T 444.  Sodium 130.  Potassium 3.7.  Chloride 92.  CO2 9.3.  Anion gap 20.7.  Creatinine 5.03.  GFR 13.  Glucose 244.  Acetone is negative.  Lactate is 2.3.  Osmolality is 309.    Glucose is 35.06.  Hemoglobin is 7.1.  Platelets are 505.  Lymphocytes are 2.4.  Personal History     Past Medical History:  Past Medical History:   Diagnosis Date    Blindness of left eye     Chronic diastolic CHF 07/15/2021    Chronic pain syndrome     Diabetes mellitus, type 2     Diabetic neuropathy 07/15/2021    Hyperlipidemia     Hypertension     Lumbosacral disc herniation     L5-S1    Metabolic acidosis     Smoker 07/15/2021    Stage 4 CKD 07/15/2021    Status post amputation of great toe, left     Status post amputation of great toe, right     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  Missed appt in [place] for PRP, partially because of  "ride issues and partially because he is hesitant to undergo PRP because it is uncomfortable.  Avastin today.  Discussed retrobulbar for PRP vs. Treatment with anti-VEGF exclusively.  Pt will consider options.  If doing PRP, will try to get done in 1 session in order to minimize trips to [place].  PDR OS -         Past Surgical History:  Past Surgical History:   Procedure Laterality Date    LUMBAR DISC SURGERY      TOE AMPUTATION            Family History:   Breast Cancer-related family history is not on file.      Social History:   Social History     Socioeconomic History    Marital status:    Tobacco Use    Smoking status: Every Day     Current packs/day: 1.00     Average packs/day: 1 pack/day for 30.0 years (30.0 ttl pk-yrs)     Types: Cigarettes     Passive exposure: Current    Smokeless tobacco: Never    Tobacco comments:     1 PACK A DAY   Vaping Use    Vaping status: Former    Substances: Nicotine    Passive vaping exposure: Yes   Substance and Sexual Activity    Alcohol use: Never    Drug use: Never    Sexual activity: Defer         Home Medications:  Advocate Blood Glucose Monitor, Cariprazine HCl, FreeStyle Shawna 3 Sensor, FreeStyle Lite, HYDROcodone-acetaminophen, NIFEdipine XL, Vitamin D3, aspirin, atorvastatin, carvedilol, citalopram, cloNIDine, cyclobenzaprine, dorzolamide-timolol, ferrous sulfate, freestyle, furosemide, glucose blood, hydrALAZINE, insulin degludec, nicotine polacrilex, omeprazole, rOPINIRole, sodium bicarbonate, and tamsulosin    Allergies:  Allergies   Allergen Reactions    Kayexalate [Sodium Polystyrene Sulfonate] GI Intolerance     \"Can not make it to the bathroom fast enough\"    Oxycodone Mental Status Change and Other (See Comments)       Review of Systems   All systems were reviewed and negative except for: unable to get     Objective   Objective     Vitals:   Temp:  [100 °F (37.8 °C)-100.3 °F (37.9 °C)] 100 °F (37.8 °C)  Heart Rate:  [107-125] 114  Resp:  [26-32] " 30  BP: (103-168)/(58-96) 168/96    Physical Exam   Physical Exam      General: He was agitated     Comments altered  HENT:      Head: Normocephalic and atraumatic.   Eyes:      Pupils: Pupils are equal, round, and reactive to light.   Cardiovascular:      Rate and Rhythm: Regular rhythm. Tachycardia present.   Pulmonary:      Effort: Respiratory distress present.      Breath sounds: Normal breath sounds.      Comments: Tachypneic  Abdominal:      General: Abdomen is flat.      Palpations: Abdomen is soft.      Tenderness: There is no abdominal tenderness.   Musculoskeletal:         General: Normal range of motion.      Cervical back: Normal range of motion.      Comments: Patient's left foot has a previous great toe amputation in the distal foot is swollen with erythema and healing ulceration on the ball of the foot.  Ulceration is black.     Result Review      Result Review:  I have personally reviewed the results from the time of this admission to 6/11/2024 16:13 EDT and agree with these findings:  [x]  Laboratory  [x]  Microbiology  [x]  Radiology  [x]  EKG/Telemetry   [x]  Cardiology/Vascular   [x]  Pathology  [x]  Old records  [x]  Other:    Lab Results (most recent)       Procedure Component Value Units Date/Time    Reticulocytes [[id number]]  (Normal) Collected: 06/11/24 1517    Specimen: Blood Updated: 06/11/24 1556     Reticulocyte % 1.65 %      Reticulocyte Absolute 0.0384 10*6/mm3     Ferritin [[id number]] Collected: 06/11/24 1232    Specimen: Blood Updated: 06/11/24 1552    Iron Profile [[id number]] Collected: 06/11/24 1232    Specimen: Blood Updated: 06/11/24 1552    Folate [[id number]] Collected: 06/11/24 1232    Specimen: Blood Updated: 06/11/24 1546    Vitamin B12 [[id number]] Collected: 06/11/24 1232    Specimen: Blood Updated: 06/11/24 1546    Blood Culture - Blood, Arm, Left [[id number]] Collected: 06/11/24 1230    Specimen: Blood from Arm, Left Updated: 06/11/24 1545    Basic Metabolic Panel  [[id number]] Updated: 06/11/24 1544    Specimen: Blood     High Sensitivity Troponin T 2Hr [[id number]] Updated: 06/11/24 1544    Specimen: Blood     Comprehensive Metabolic Panel [[id number]] Updated: 06/11/24 1544    Specimen: Blood     Magnesium [[id number]] Updated: 06/11/24 1544    Specimen: Blood     Phosphorus [[id number]]  (Abnormal) Collected: 06/11/24 1517    Specimen: Blood Updated: 06/11/24 1541     Phosphorus 5.1 mg/dL     Magnesium [[id number]]  (Normal) Collected: 06/11/24 1517    Specimen: Blood Updated: 06/11/24 1535     Magnesium 1.7 mg/dL     CBC & Differential [[id number]]  (Abnormal) Collected: 06/11/24 1517    Specimen: Blood Updated: 06/11/24 1530    Narrative:      The following orders were created for panel order CBC & Differential.  Procedure                               Abnormality         Status                     ---------                               -----------         ------                     CBC Auto Differential[[id number]]        Abnormal            Final result               Scan Slide[[id number]]                                                                    Please view results for these tests on the individual orders.    CBC Auto Differential [[id number]]  (Abnormal) Collected: 06/11/24 1517    Specimen: Blood Updated: 06/11/24 1530     WBC 35.06 10*3/mm3      RBC 2.37 10*6/mm3      Hemoglobin 7.1 g/dL      Hematocrit 22.6 %      MCV 95.4 fL      MCH 30.0 pg      MCHC 31.4 g/dL      RDW 13.3 %      RDW-SD 46.5 fl      MPV 10.0 fL      Platelets 505 10*3/mm3      Neutrophil % 88.0 %      Lymphocyte % 2.4 %      Monocyte % 5.4 %      Eosinophil % 0.0 %      Basophil % 0.4 %      Immature Grans % 3.8 %      Neutrophils, Absolute 30.85 10*3/mm3      Lymphocytes, Absolute 0.83 10*3/mm3      Monocytes, Absolute 1.89 10*3/mm3      Eosinophils, Absolute 0.00 10*3/mm3      Basophils, Absolute 0.15 10*3/mm3      Immature Grans, Absolute 1.34 10*3/mm3      nRBC 0.1 /100 WBC     Narrative:       Appended report. These results have been appended to a previously verified report.    POC Glucose Once [[id number]]  (Abnormal) Collected: 06/11/24 1522    Specimen: Blood Updated: 06/11/24 1524     Glucose 244 mg/dL      Comment: Serial Number: [id number]:  [id number]       Phosphorus [[id number]]  (Normal) Collected: 06/11/24 1232    Specimen: Blood Updated: 06/11/24 1505     Phosphorus 4.4 mg/dL     Osmolality, Serum [[id number]]  (Abnormal) Collected: 06/11/24 1232    Specimen: Blood Updated: 06/11/24 1502     Osmolality 309 mOsm/kg     Hemoglobin A1c [[id number]] Collected: 06/11/24 1232    Specimen: Blood Updated: 06/11/24 1449    CBC & Differential [[id number]]  (Abnormal) Collected: 06/11/24 1232    Specimen: Blood Updated: 06/11/24 1337    Narrative:      The following orders were created for panel order CBC & Differential.  Procedure                               Abnormality         Status                     ---------                               -----------         ------                     CBC Auto Differential[[id number]]        Abnormal            Final result               Scan Slide[[id number]]                                       Final result                 Please view results for these tests on the individual orders.    CBC Auto Differential [[id number]]  (Abnormal) Collected: 06/11/24 1232    Specimen: Blood Updated: 06/11/24 1337     WBC 38.57 10*3/mm3      RBC 2.67 10*6/mm3      Hemoglobin 7.8 g/dL      Hematocrit 24.7 %      MCV 92.5 fL      MCH 29.2 pg      MCHC 31.6 g/dL      RDW 13.2 %      RDW-SD 45.3 fl      MPV 9.9 fL      Platelets 534 10*3/mm3     Narrative:      The previously reported component NRBC is no longer being reported. Previous result was 0.1 /100 WBC (Reference Range: 0.0-0.2 /100 WBC) on 6/11/2024 at 1254 EDT.    Scan Slide [[id number]] Collected: 06/11/24 1232    Specimen: Blood Updated: 06/11/24 1337     Scan Slide --     Comment: See Manual Differential Results        Manual Differential [[id number]]  (Abnormal) Collected: 06/11/24 1232    Specimen: Blood Updated: 06/11/24 1337     Neutrophil % 89.0 %      Lymphocyte % 1.0 %      Monocyte % 3.0 %      Bands %  4.0 %      Metamyelocyte % 1.0 %      Promyelocyte % 2.0 %      Neutrophils Absolute 35.87 10*3/mm3      Lymphocytes Absolute 0.39 10*3/mm3      Monocytes Absolute 1.16 10*3/mm3      Anisocytosis Slight/1+     Carlos Cells --     Comment: Observed          Hypochromia Slight/1+     Microcytes --     Comment: Observed          Poikilocytes Slight/1+     Polychromasia --     Comment: Observed          WBC Morphology Normal     Hypersegmented Neutrophils --     Comment: Observed          Platelet Estimate Increased     Clumped Platelets --     Comment: Observed          Large Platelets Slight/1+     Comment:           Giant Platelets --     Comment: Observed         Comprehensive Metabolic Panel [[id number]]  (Abnormal) Collected: 06/11/24 1232    Specimen: Blood Updated: 06/11/24 1324     Glucose 222 mg/dL      BUN 65 mg/dL      Creatinine 5.03 mg/dL      Sodium 130 mmol/L      Potassium 3.7 mmol/L      Chloride 92 mmol/L      CO2 9.3 mmol/L      Calcium 9.3 mg/dL      Total Protein 7.1 g/dL      Albumin 3.1 g/dL      ALT (SGPT) 8 U/L      AST (SGOT) 28 U/L      Alkaline Phosphatase 186 U/L      Total Bilirubin 0.4 mg/dL      Globulin 4.0 gm/dL      A/G Ratio 0.8 g/dL      BUN/Creatinine Ratio 12.9     Anion Gap 28.7 mmol/L      eGFR 13.0 mL/min/1.73      Comment: <15 Indicative of kidney failure       Narrative:      GFR Normal >60  Chronic Kidney Disease <60  Kidney Failure <15      Single High Sensitivity Troponin T [[id number]]  (Abnormal) Collected: 06/11/24 1232    Specimen: Blood Updated: 06/11/24 1324     HS Troponin T 444 ng/L     Narrative:      High Sensitive Troponin T Reference Range:  <14.0 ng/L- Negative Female for AMI  <22.0 ng/L- Negative Male for AMI  >=14 - Abnormal Female indicating possible myocardial  injury.  >=22 - Abnormal Male indicating possible myocardial injury.   Clinicians would have to utilize clinical acumen, EKG, Troponin, and serial changes to determine if it is an Acute Myocardial Infarction or myocardial injury due to an underlying chronic condition.         Lactic Acid, Plasma [[id number]]  (Abnormal) Collected: 06/11/24 1232    Specimen: Blood Updated: 06/11/24 1324     Lactate 2.3 mmol/L     Urinalysis, Microscopic Only - Urine, Clean Catch [[id number]]  (Abnormal) Collected: 06/11/24 1232    Specimen: Urine, Clean Catch Updated: 06/11/24 1303     RBC, UA 0-2 /HPF      WBC, UA 3-5 /HPF      Bacteria, UA Trace /HPF      Squamous Epithelial Cells, UA 0-2 /HPF      Hyaline Casts, UA None Seen /LPF      Methodology Manual Light Microscopy    Urinalysis With Microscopic If Indicated (No Culture) - Urine, Clean Catch [[id number]]  (Abnormal) Collected: 06/11/24 1232    Specimen: Urine, Clean Catch Updated: 06/11/24 1256     Color, UA Yellow     Appearance, UA Clear     pH, UA 5.5     Specific Gravity, UA 1.014     Glucose,  mg/dL (1+)     Ketones, UA 15 mg/dL (1+)     Bilirubin, UA Negative     Blood, UA Negative     Protein, UA >=300 mg/dL (3+)     Leuk Esterase, UA Negative     Nitrite, UA Negative     Urobilinogen, UA 0.2 E.U./dL    Acetone [[id number]]  (Normal) Collected: 06/11/24 1232    Specimen: Blood Updated: 06/11/24 1248     Acetone Negative    POC Lactate [[id number]]  (Normal) Collected: 06/11/24 1242    Specimen: Arterial Blood Updated: 06/11/24 1245     Lactate 1.5 mmol/L      Comment: Serial Number: [id number]:  [id number]       POC Electrolyte Panel [[id number]]  (Abnormal) Collected: 06/11/24 1242    Specimen: Arterial Blood Updated: 06/11/24 1245     Sodium 129 mmol/L      POC Potassium 3.8 mmol/L      Chloride 106 mmol/L      Ionized Calcium 1.09 mmol/L      Comment: Serial Number: [id number]:  [id number]       POC Glucose Once [[id number]]  (Abnormal) Collected: 06/11/24 1242     Specimen: Blood Updated: 06/11/24 1245     Glucose 232 mg/dL      Comment: Serial Number: [id number]:  [id number]       Blood Gas, Arterial - [[id number]]  (Abnormal) Collected: 06/11/24 1242    Specimen: Arterial Blood Updated: 06/11/24 1245     Site Right Radial     Noam's Test Positive     pH, Arterial 7.350 pH units      pCO2, Arterial 14.9 mm Hg      pO2, Arterial 95.8 mm Hg      HCO3, Arterial 8.2 mmol/L      Base Excess, Arterial -15.6 mmol/L      Comment: Serial Number: [id number]:  [id number]        O2 Saturation, Arterial 97.4 %      Hemoglobin, Blood Gas 7.6 g/dL      Hematocrit, Blood Gas 22.0 %      Barometric Pressure for Blood Gas 744.7000 mmHg      Modality Room Air     Notified Who dr reddy     Read Back Yes     Notified Time --     Hemodilution No    Blood Culture - Blood, Arm, Right [[id number]] Collected: 06/11/24 1232    Specimen: Blood from Arm, Right Updated: 06/11/24 1239    [place] Draw [[id number]] Collected: 06/11/24 1232    Specimen: Blood Updated: 06/11/24 1238    Narrative:      The following orders were created for panel order [place] Draw.  Procedure                               Abnormality         Status                     ---------                               -----------         ------                     Green Top (Gel)[[id number]]                                  Final result               Lavender Top[[id number]]                                     Final result               Gold Top - SST[[id number]]                                   Final result               Light Blue Top[[id number]]                                   Final result                 Please view results for these tests on the individual orders.    Gold Top - SST [[id number]] Collected: 06/11/24 1232    Specimen: Blood Updated: 06/11/24 1238     Extra Tube Hold for add-ons.     Comment: Auto resulted.       Green Top (Gel) [[id number]] Collected: 06/11/24 1232    Specimen: Blood Updated: 06/11/24 1238     Extra Tube  Hold for add-ons.     Comment: Auto resulted.       Light Blue Top [[id number]] Collected: 06/11/24 1232    Specimen: Blood Updated: 06/11/24 1238     Extra Tube Hold for add-ons.     Comment: Auto resulted       Lavender Top [[id number]] Collected: 06/11/24 1232    Specimen: Blood Updated: 06/11/24 1237     Extra Tube hold for add-on     Comment: Auto resulted               XR Foot 2 View Left    Result Date: 6/11/2024  Impression: 1. Soft tissue ulceration along the plantar aspect of the medial left foot. No osseous erosion to suggest radiographic osteomyelitis. 2. Chronic appearing changes at the distal second metatarsal and proximal phalanx of the second digit likely related to old trauma or old infection. Electronically Signed: Elias Boland  6/11/2024 2:24 PM EDT  Workstation ID: [id number]    CT Head Without Contrast    Result Date: 6/11/2024  Impression: No evidence of acute intracranial abnormality Electronically Signed: Leo Figueredo MD  6/11/2024 2:21 PM EDT  Workstation ID: [id number]    XR Chest 1 View    Result Date: 6/11/2024  Impression: 1.Cardiomegaly suggested. Electronically Signed: Memo Werner MD  6/11/2024 1:32 PM EDT  Workstation ID: [id number]     Assessment & Plan   Assessment / Plan   #1 sepsis as evidenced by tachypnea, tachycardia, leukocytosis, fever  -First felt to be his foot  -Zosyn and vancomycin ordered pharmacy to dose.  -Getting fluids.    #2 DKA  -DKA protocol ordered    #3 foot infection  -Zosyn and vancomycin.  -MRI of his foot without contrast ordered.  Doppler ordered.  Arterial duplex ordered.  May need to consult Dr. Mcmanus depending on findings.  -Dr. Farias with podiatry consulted.  Wound care consulted.    #4 YOSI on CKD with severe acidosis  -Patient may need hemodialysis  -Dr. Rivero with nephrology consulted  -As needed bicarb ordered    #5 altered mental status  -High likelihood of intubation  -Precedex and Levophed ordered so patient can lay still for procedures and  imaging.    #6 hypertension as needed hydralazine ordered    #7 tobacco use  -Continue patch ordered; as needed DuoNeb        VTE Prophylaxis:  Pharmacologic VTE prophylaxis orders are present.        CODE STATUS:    Level Of Support Discussed With: Patient  Code Status (Patient has no pulse and is not breathing): CPR (Attempt to Resuscitate)  Medical Interventions (Patient has pulse or is breathing): Full Support      Admission Status:  I believe this patient meets inpatient status.    Electronically signed by Seble Loredo DO, 06/11/24, 4:02 PM EDT.

## 2024-06-12 PROBLEM — S98.112A AMPUTATION OF LEFT GREAT TOE: Status: ACTIVE | Noted: 2024-01-01

## 2024-06-12 PROBLEM — S98.111A AMPUTATION OF RIGHT GREAT TOE: Status: ACTIVE | Noted: 2024-01-01

## 2024-06-12 PROBLEM — D63.8 ANEMIA IN OTHER CHRONIC DISEASES CLASSIFIED ELSEWHERE: Status: ACTIVE | Noted: 2024-01-01

## 2024-06-12 PROBLEM — M79.672 FOOT PAIN, LEFT: Status: ACTIVE | Noted: 2024-01-01

## 2024-06-12 PROBLEM — G62.9 NEUROPATHY: Status: ACTIVE | Noted: 2024-01-01

## 2024-06-12 PROBLEM — M86.272 SUBACUTE OSTEOMYELITIS OF LEFT FOOT: Status: ACTIVE | Noted: 2024-01-01

## 2024-06-12 NOTE — PLAN OF CARE
Goal Outcome Evaluation:                 PT HAD TO BE MAXED ON PRECEDEX OVERNIGHT TO STAY ON BIPAP. PT HR AND BP TOLERATED HIGH DOSE PRECEDEX WELL. TRIALED PATIENT OFF BIPAP AND PT SATTED 86-88 ON 6L NC. DUPLEX DONE OF BLE. HGB LOW THIS AM AND COMMUNICATED TO HOSPITALIST                               Patient is alert and oriented x4. Came in for diarrhea "all day yesterday". Denies abdominal pain, seen with Dr. Stack. Denies n/v or bloody stools. States "I want to eat". Does not appear to be in any acute distress at this time.

## 2024-06-12 NOTE — SIGNIFICANT NOTE
06/12/24 0802   OTHER   Discipline speech language pathologist   Rehab Time/Intention   Session Not Performed unable to evaluate, medical status change  (On BiPAP, Mental Status)

## 2024-06-12 NOTE — PLAN OF CARE
Goal Outcome Evaluation:      Patient has been on NIV 14/7 today.  Patient has had a desat episode in which O2 needed to be increased to 45%.  Patient has tolerated NIV well.

## 2024-06-12 NOTE — PROGRESS NOTES
"DAILY PROGRESS NOTE  [facility]      PATIENT IDENTIFICATION    Name: Rogelio Dee  :  1970  MRN: [id number]    CHIEF COMPLAINT/PRINCIPAL DIAGNOSIS: DKA (diabetic ketoacidosis)       SUBJECTIVE    Seen in bed, on BiPAP. Remains on bicarb gtt and bipap.     ROS:   UTO given mental status     OBJECTIVE     Exam:  /65 (BP Location: Left arm, Patient Position: Lying)   Pulse 81   Temp 98.4 °F (36.9 °C) (Bladder)   Resp 26   Ht 172.7 cm (68\")   Wt 80.5 kg (177 lb 7.5 oz)   SpO2 92%   BMI 26.98 kg/m²   Intake/Output last 24 hours:    Intake/Output Summary (Last 24 hours) at 2024 1229  Last data filed at 2024 0300  Gross per 24 hour   Intake 4386.46 ml   Output 280 ml   Net 4106.46 ml        Gen: NAD, in bed  Resp: overall clear, no increased work of breathing, on BiPAP  CV: tachy, no m/r/g. + peripheral edema  GI: Soft, nontender, (+) BS. nondistended  Psych: sedated, not oriented  Skin: warm and dry on palpation. No rash on inspection.  Neuro: withdraws to pain, not communicating to me    DATA REVIEW:  Lab Results (last 24 hours)       Procedure Component Value Units Date/Time    Basic Metabolic Panel [[id number]]  (Abnormal) Collected: 24 114    Specimen: Blood Updated: 24 1217     Glucose 158 mg/dL      BUN 62 mg/dL      Creatinine 4.02 mg/dL      Sodium 135 mmol/L      Potassium 4.1 mmol/L      Chloride 98 mmol/L      CO2 22.9 mmol/L      Calcium 7.4 mg/dL      BUN/Creatinine Ratio 15.4     Anion Gap 14.1 mmol/L      eGFR 16.9 mL/min/1.73     Narrative:      GFR Normal >60  Chronic Kidney Disease <60  Kidney Failure <15      Magnesium [[id number]]  (Normal) Collected: 24 114    Specimen: Blood Updated: 24 1217     Magnesium 1.9 mg/dL     Phosphorus [[id number]]  (Normal) Collected: 24 114    Specimen: Blood Updated: 24 1217     Phosphorus 3.0 mg/dL     POC Glucose Once [[id number]]  (Abnormal) Collected: 24 1146    Specimen: Blood " Updated: 06/12/24 1149     Glucose 144 mg/dL      Comment: Serial Number: [id number]:  [id number]       Wound Culture - Wound, Foot, Left [[id number]] Collected: 06/12/24 1049    Specimen: Wound from Foot, Left Updated: 06/12/24 1100    Magnesium [[id number]]  (Normal) Collected: 06/12/24 1014    Specimen: Blood Updated: 06/12/24 1045     Magnesium 1.6 mg/dL     Basic Metabolic Panel [[id number]]  (Abnormal) Collected: 06/12/24 1014    Specimen: Blood Updated: 06/12/24 1045     Glucose 146 mg/dL      BUN 62 mg/dL      Creatinine 4.16 mg/dL      Sodium 133 mmol/L      Potassium 3.7 mmol/L      Chloride 97 mmol/L      CO2 22.7 mmol/L      Calcium 7.4 mg/dL      BUN/Creatinine Ratio 14.9     Anion Gap 13.3 mmol/L      eGFR 16.3 mL/min/1.73     Narrative:      GFR Normal >60  Chronic Kidney Disease <60  Kidney Failure <15      Phosphorus [[id number]]  (Normal) Collected: 06/12/24 1014    Specimen: Blood Updated: 06/12/24 1045     Phosphorus 3.1 mg/dL     POC Glucose Once [[id number]]  (Abnormal) Collected: 06/12/24 1034    Specimen: Blood Updated: 06/12/24 1035     Glucose 129 mg/dL      Comment: Serial Number: [id number]:  [id number]       POC Glucose Once [[id number]]  (Abnormal) Collected: 06/12/24 0938    Specimen: Blood Updated: 06/12/24 0940     Glucose 118 mg/dL      Comment: Serial Number: [id number]:  [id number]       POC Glucose Once [[id number]]  (Abnormal) Collected: 06/12/24 0842    Specimen: Blood Updated: 06/12/24 0844     Glucose 104 mg/dL      Comment: Serial Number: [id number]:  [id number]       POC Glucose Once [[id number]]  (Abnormal) Collected: 06/12/24 0718    Specimen: Blood Updated: 06/12/24 0720     Glucose 101 mg/dL      Comment: Serial Number: [id number]:  [id number]       POC Glucose Once [[id number]]  (Abnormal) Collected: 06/12/24 0615    Specimen: Blood Updated: 06/12/24 0617     Glucose 112 mg/dL      Comment: Serial Number: [id number]:  [id number]       Blood  Gas, Arterial - [[id number]]  (Abnormal) Collected: 06/12/24 0609    Specimen: Arterial Blood Updated: 06/12/24 0612     Site Right Radial     Noma's Test Positive     pH, Arterial 7.507 pH units      pCO2, Arterial 24.1 mm Hg      pO2, Arterial 56.4 mm Hg      HCO3, Arterial 19.2 mmol/L      Base Excess, Arterial -3.8 mmol/L      Comment: Serial Number: [id number]:  [id number]        O2 Saturation, Arterial 92.3 %      Hemoglobin, Blood Gas 4.9 g/dL      Hematocrit, Blood Gas 14.0 %      Barometric Pressure for Blood Gas 742.7000 mmHg      Modality BiPap     FIO2 30 %      Notified Who CHRISTIANO Chen     Read Back Yes     Notified Time --     Hemodilution No     Device Comment 14/7     PO2/FIO2 188    POC Glucose Once [[id number]]  (Abnormal) Collected: 06/12/24 0517    Specimen: Blood Updated: 06/12/24 0520     Glucose 131 mg/dL      Comment: Serial Number: [id number]:  [id number]       CBC & Differential [[id number]]  (Abnormal) Collected: 06/12/24 0354    Specimen: Blood Updated: 06/12/24 0517    Narrative:      The following orders were created for panel order CBC & Differential.  Procedure                               Abnormality         Status                     ---------                               -----------         ------                     CBC Auto Differential[[id number]]        Abnormal            Final result               Scan Slide[[id number]]                                       Final result                 Please view results for these tests on the individual orders.    Scan Slide [[id number]] Collected: 06/12/24 0354    Specimen: Blood Updated: 06/12/24 0517     Anisocytosis Slight/1+     Hypochromia Slight/1+     Poikilocytes Slight/1+     WBC Morphology Normal     Large Platelets Slight/1+    Vancomycin, Random [[id number]]  (Normal) Collected: 06/12/24 0354    Specimen: Blood Updated: 06/12/24 0457     Vancomycin Random 22.60 mcg/mL     Narrative:      Therapeutic Ranges for  Vancomycin    Vancomycin Random   5.0-40.0 mcg/mL  Vancomycin Trough   5.0-20.0 mcg/mL  Vancomycin Peak     20.0-40.0 mcg/mL    Basic Metabolic Panel [[id number]]  (Abnormal) Collected: 06/12/24 0354    Specimen: Blood Updated: 06/12/24 0425     Glucose 170 mg/dL      BUN 65 mg/dL      Creatinine 4.17 mg/dL      Sodium 135 mmol/L      Potassium 3.6 mmol/L      Chloride 102 mmol/L      CO2 20.1 mmol/L      Calcium 7.6 mg/dL      BUN/Creatinine Ratio 15.6     Anion Gap 12.9 mmol/L      eGFR 16.2 mL/min/1.73     Narrative:      GFR Normal >60  Chronic Kidney Disease <60  Kidney Failure <15      Magnesium [[id number]]  (Normal) Collected: 06/12/24 0354    Specimen: Blood Updated: 06/12/24 0425     Magnesium 1.6 mg/dL     Phosphorus [[id number]]  (Normal) Collected: 06/12/24 0354    Specimen: Blood Updated: 06/12/24 0425     Phosphorus 2.7 mg/dL     CBC Auto Differential [[id number]]  (Abnormal) Collected: 06/12/24 0354    Specimen: Blood Updated: 06/12/24 0416     WBC 23.80 10*3/mm3      RBC 1.89 10*6/mm3      Hemoglobin 5.6 g/dL      Hematocrit 16.8 %      MCV 88.9 fL      MCH 29.6 pg      MCHC 33.3 g/dL      RDW 13.2 %      RDW-SD 43.1 fl      MPV 9.8 fL      Platelets 369 10*3/mm3      Neutrophil % 91.7 %      Lymphocyte % 2.6 %      Monocyte % 2.6 %      Eosinophil % 0.6 %      Basophil % 0.4 %      Immature Grans % 2.1 %      Neutrophils, Absolute 21.84 10*3/mm3      Lymphocytes, Absolute 0.61 10*3/mm3      Monocytes, Absolute 0.61 10*3/mm3      Eosinophils, Absolute 0.14 10*3/mm3      Basophils, Absolute 0.10 10*3/mm3      Immature Grans, Absolute 0.50 10*3/mm3      nRBC 0.1 /100 WBC     POC Glucose Once [[id number]]  (Abnormal) Collected: 06/12/24 0414    Specimen: Blood Updated: 06/12/24 0415     Glucose 163 mg/dL      Comment: Serial Number: [id number]:  [id number]       POC Glucose Once [[id number]]  (Abnormal) Collected: 06/12/24 0308    Specimen: Blood Updated: 06/12/24 0310     Glucose 178 mg/dL       Comment: Serial Number: [id number]:  [id number]       POC Glucose Once [[id number]]  (Abnormal) Collected: 06/12/24 0209    Specimen: Blood Updated: 06/12/24 0211     Glucose 197 mg/dL      Comment: Serial Number: [id number]:  [id number]       POC Glucose Once [[id number]]  (Abnormal) Collected: 06/12/24 0109    Specimen: Blood Updated: 06/12/24 0111     Glucose 193 mg/dL      Comment: Serial Number: [id number]:  [id number]       Blood Culture - Blood, Arm, Left [[id number]]  (Abnormal) Collected: 06/11/24 1230    Specimen: Blood from Arm, Left Updated: 06/12/24 0047     Blood Culture Abnormal Stain     Gram Stain Anaerobic Bottle Gram positive cocci in chains      Aerobic Bottle Gram positive cocci in chains    Blood Culture ID, PCR - Blood, Arm, Right [[id number]]  (Abnormal) Collected: 06/11/24 1232    Specimen: Blood from Arm, Right Updated: 06/12/24 0035     BCID, PCR Streptococcus spp, not A, B, or pneumoniae. Identification by BCID2 PCR.     BOTTLE TYPE Aerobic Bottle    Phosphorus [[id number]]  (Normal) Collected: 06/12/24 0008    Specimen: Blood Updated: 06/12/24 0031     Phosphorus 3.2 mg/dL     Basic Metabolic Panel [[id number]]  (Abnormal) Collected: 06/12/24 0008    Specimen: Blood Updated: 06/12/24 0030     Glucose 222 mg/dL      BUN 66 mg/dL      Creatinine 4.28 mg/dL      Sodium 135 mmol/L      Potassium 3.5 mmol/L      Chloride 103 mmol/L      CO2 18.6 mmol/L      Calcium 7.6 mg/dL      BUN/Creatinine Ratio 15.4     Anion Gap 13.4 mmol/L      eGFR 15.7 mL/min/1.73     Narrative:      GFR Normal >60  Chronic Kidney Disease <60  Kidney Failure <15      Magnesium [[id number]]  (Normal) Collected: 06/12/24 0008    Specimen: Blood Updated: 06/12/24 0030     Magnesium 1.7 mg/dL     POC Glucose Once [[id number]]  (Abnormal) Collected: 06/12/24 0004    Specimen: Blood Updated: 06/12/24 0005     Glucose 232 mg/dL      Comment: Serial Number: [id number]:  [id number]       POC Glucose Once  [[id number]]  (Abnormal) Collected: 06/11/24 2319    Specimen: Blood Updated: 06/11/24 2320     Glucose 231 mg/dL      Comment: Serial Number: [id number]:  [id number]       Folate [[id number]]  (Normal) Collected: 06/11/24 1232    Specimen: Blood Updated: 06/11/24 2247     Folate 9.92 ng/mL     Narrative:      Results may be falsely increased if patient taking Biotin.      Vitamin B12 [[id number]]  (Abnormal) Collected: 06/11/24 1232    Specimen: Blood Updated: 06/11/24 2247     Vitamin B-12 1,019 pg/mL     Narrative:      Results may be falsely increased if patient taking Biotin.      Blood Culture - Blood, Arm, Right [[id number]]  (Abnormal) Collected: 06/11/24 1232    Specimen: Blood from Arm, Right Updated: 06/11/24 2236     Blood Culture Abnormal Stain     Gram Stain Aerobic Bottle Gram positive cocci in chains      Anaerobic Bottle Gram positive cocci in chains    POC Glucose Once [[id number]]  (Abnormal) Collected: 06/11/24 2214    Specimen: Blood Updated: 06/11/24 2215     Glucose 194 mg/dL      Comment: Serial Number: [id number]:  [id number]       POC Glucose Once [[id number]]  (Abnormal) Collected: 06/11/24 2143    Specimen: Blood Updated: 06/11/24 2144     Glucose 191 mg/dL      Comment: Serial Number: [id number]:  [id number]       POC Glucose Once [[id number]]  (Abnormal) Collected: 06/11/24 2109    Specimen: Blood Updated: 06/11/24 2110     Glucose 191 mg/dL      Comment: Serial Number: [id number]:  [id number]       Magnesium [[id number]]  (Abnormal) Collected: 06/11/24 2038    Specimen: Blood Updated: 06/11/24 2103     Magnesium 1.4 mg/dL     Basic Metabolic Panel [[id number]]  (Abnormal) Collected: 06/11/24 2038    Specimen: Blood Updated: 06/11/24 2103     Glucose 199 mg/dL      BUN 64 mg/dL      Creatinine 4.42 mg/dL      Sodium 138 mmol/L      Potassium 3.8 mmol/L      Chloride 105 mmol/L      CO2 13.1 mmol/L      Calcium 7.9 mg/dL      BUN/Creatinine Ratio 14.5     Anion Gap 19.9  mmol/L      eGFR 15.1 mL/min/1.73     Narrative:      GFR Normal >60  Chronic Kidney Disease <60  Kidney Failure <15      Phosphorus [[id number]]  (Normal) Collected: 06/11/24 2038    Specimen: Blood Updated: 06/11/24 2103     Phosphorus 4.3 mg/dL     POC Glucose Once [[id number]]  (Abnormal) Collected: 06/11/24 2016    Specimen: Blood Updated: 06/11/24 2017     Glucose 215 mg/dL      Comment: Serial Number: [id number]:  [id number]       POC Glucose Once [[id number]]  (Abnormal) Collected: 06/11/24 1919    Specimen: Blood Updated: 06/11/24 1923     Glucose 217 mg/dL      Comment: Serial Number: [id number]:  [id number]       High Sensitivity Troponin T 2Hr [[id number]]  (Abnormal) Collected: 06/11/24 1734    Specimen: Blood Updated: 06/11/24 1821     HS Troponin T 442 ng/L      Troponin T Delta -2 ng/L     Narrative:      High Sensitive Troponin T Reference Range:  <14.0 ng/L- Negative Female for AMI  <22.0 ng/L- Negative Male for AMI  >=14 - Abnormal Female indicating possible myocardial injury.  >=22 - Abnormal Male indicating possible myocardial injury.   Clinicians would have to utilize clinical acumen, EKG, Troponin, and serial changes to determine if it is an Acute Myocardial Infarction or myocardial injury due to an underlying chronic condition.         Comprehensive Metabolic Panel [[id number]]  (Abnormal) Collected: 06/11/24 1734    Specimen: Blood Updated: 06/11/24 1810     Glucose 194 mg/dL      BUN 64 mg/dL      Creatinine 4.63 mg/dL      Sodium 135 mmol/L      Potassium 3.9 mmol/L      Chloride 103 mmol/L      CO2 10.1 mmol/L      Calcium 7.9 mg/dL      Total Protein 5.8 g/dL      Albumin 2.7 g/dL      ALT (SGPT) 8 U/L      AST (SGOT) 28 U/L      Alkaline Phosphatase 139 U/L      Total Bilirubin 0.2 mg/dL      Globulin 3.1 gm/dL      A/G Ratio 0.9 g/dL      BUN/Creatinine Ratio 13.8     Anion Gap 21.9 mmol/L      eGFR 14.3 mL/min/1.73      Comment: <15 Indicative of kidney failure       Narrative:       GFR Normal >60  Chronic Kidney Disease <60  Kidney Failure <15      Magnesium [[id number]]  (Normal) Collected: 06/11/24 1734    Specimen: Blood Updated: 06/11/24 1810     Magnesium 1.7 mg/dL     Basic Metabolic Panel [[id number]]  (Abnormal) Collected: 06/11/24 1734    Specimen: Blood Updated: 06/11/24 1810     Glucose 194 mg/dL      BUN 64 mg/dL      Creatinine 4.63 mg/dL      Sodium 135 mmol/L      Potassium 3.9 mmol/L      Chloride 103 mmol/L      CO2 10.1 mmol/L      Calcium 7.9 mg/dL      BUN/Creatinine Ratio 13.8     Anion Gap 21.9 mmol/L      eGFR 14.3 mL/min/1.73      Comment: <15 Indicative of kidney failure       Narrative:      GFR Normal >60  Chronic Kidney Disease <60  Kidney Failure <15      STAT Lactic Acid, Reflex [[id number]]  (Normal) Collected: 06/11/24 1734    Specimen: Blood Updated: 06/11/24 1802     Lactate 1.4 mmol/L     POC Glucose Once [[id number]]  (Abnormal) Collected: 06/11/24 1750    Specimen: Venous Blood Updated: 06/11/24 1754     Glucose 203 mg/dL      Comment: Serial Number: [id number]:  [id number]       Blood Gas, Venous - [[id number]]  (Abnormal) Collected: 06/11/24 1750    Specimen: Venous Blood Updated: 06/11/24 1754     pH, Venous 7.254 pH Units      pCO2, Venous 22.2 mm Hg      pO2, Venous 62.2 mm Hg      HCO3, Venous 9.8 mmol/L      Base Excess, Venous -15.8 mmol/L      Comment: Serial Number: [id number]:  [id number]        O2 Saturation, Venous 88.4 %      Hemoglobin, Blood Gas 7.4 g/dL      Barometric Pressure for Blood Gas 740.5000 mmHg      Modality BiPap     FIO2 40 %      PEEP 7     PIP 14 cmH2O      Notified Who tiana bocanegra    POC Lactate [[id number]]  (Normal) Collected: 06/11/24 1750    Specimen: Venous Blood Updated: 06/11/24 1754     Lactate 1.3 mmol/L      Comment: Serial Number: [id number]:  [id number]       POC Electrolyte Panel [[id number]]  (Abnormal) Collected: 06/11/24 1750    Specimen: Venous Blood Updated: 06/11/24 1754     Sodium 134 mmol/L      POC Potassium  3.9 mmol/L      Chloride 109 mmol/L      Ionized Calcium 1.07 mmol/L      Comment: Serial Number: [id number]:  [id number]       Hemoglobin A1c [[id number]]  (Abnormal) Collected: 06/11/24 1232    Specimen: Blood Updated: 06/11/24 1726     Hemoglobin A1C 6.10 %     Narrative:      Hemoglobin A1C Ranges:    Increased Risk for Diabetes  5.7% to 6.4%  Diabetes                     >= 6.5%  Diabetic Goal                < 7.0%    POC Glucose Once [[id number]]  (Abnormal) Collected: 06/11/24 1648    Specimen: Blood Updated: 06/11/24 1649     Glucose 207 mg/dL      Comment: Serial Number: [id number]:  [id number]       POC Glucose Once [[id number]]  (Abnormal) Collected: 06/11/24 1623    Specimen: Blood Updated: 06/11/24 1648     Glucose 214 mg/dL      Comment: Serial Number: [id number]:  [id number]       Iron Profile [[id number]]  (Abnormal) Collected: 06/11/24 1232    Specimen: Blood Updated: 06/11/24 1621     Iron 13 mcg/dL      Iron Saturation (TSAT) 8 %      Transferrin 108 mg/dL      TIBC 161 mcg/dL     Ferritin [[id number]]  (Abnormal) Collected: 06/11/24 1232    Specimen: Blood Updated: 06/11/24 1616     Ferritin 727.80 ng/mL     Narrative:      <12 ng/mL usually associated with Iron Deficiency Anemia. Above normal range levels may be due to Hepatic and/or Chronic Inflammatory Disease.  Results may be falsely decreased if patient taking Biotin.      Reticulocytes [[id number]]  (Normal) Collected: 06/11/24 1517    Specimen: Blood Updated: 06/11/24 1556     Reticulocyte % 1.65 %      Reticulocyte Absolute 0.0384 10*6/mm3     Phosphorus [[id number]]  (Abnormal) Collected: 06/11/24 1517    Specimen: Blood Updated: 06/11/24 1541     Phosphorus 5.1 mg/dL     Magnesium [[id number]]  (Normal) Collected: 06/11/24 1517    Specimen: Blood Updated: 06/11/24 1535     Magnesium 1.7 mg/dL     CBC & Differential [[id number]]  (Abnormal) Collected: 06/11/24 1517    Specimen: Blood Updated: 06/11/24 1530    Narrative:      The  following orders were created for panel order CBC & Differential.  Procedure                               Abnormality         Status                     ---------                               -----------         ------                     CBC Auto Differential[[id number]]        Abnormal            Final result               Scan Slide[[id number]]                                                                    Please view results for these tests on the individual orders.    CBC Auto Differential [[id number]]  (Abnormal) Collected: 06/11/24 1517    Specimen: Blood Updated: 06/11/24 1530     WBC 35.06 10*3/mm3      RBC 2.37 10*6/mm3      Hemoglobin 7.1 g/dL      Hematocrit 22.6 %      MCV 95.4 fL      MCH 30.0 pg      MCHC 31.4 g/dL      RDW 13.3 %      RDW-SD 46.5 fl      MPV 10.0 fL      Platelets 505 10*3/mm3      Neutrophil % 88.0 %      Lymphocyte % 2.4 %      Monocyte % 5.4 %      Eosinophil % 0.0 %      Basophil % 0.4 %      Immature Grans % 3.8 %      Neutrophils, Absolute 30.85 10*3/mm3      Lymphocytes, Absolute 0.83 10*3/mm3      Monocytes, Absolute 1.89 10*3/mm3      Eosinophils, Absolute 0.00 10*3/mm3      Basophils, Absolute 0.15 10*3/mm3      Immature Grans, Absolute 1.34 10*3/mm3      nRBC 0.1 /100 WBC     Narrative:      Appended report. These results have been appended to a previously verified report.    POC Glucose Once [[id number]]  (Abnormal) Collected: 06/11/24 1522    Specimen: Blood Updated: 06/11/24 1524     Glucose 244 mg/dL      Comment: Serial Number: [id number]:  [id number]       Phosphorus [[id number]]  (Normal) Collected: 06/11/24 1232    Specimen: Blood Updated: 06/11/24 1505     Phosphorus 4.4 mg/dL     Osmolality, Serum [[id number]]  (Abnormal) Collected: 06/11/24 1232    Specimen: Blood Updated: 06/11/24 1502     Osmolality 309 mOsm/kg     CBC & Differential [[id number]]  (Abnormal) Collected: 06/11/24 1232    Specimen: Blood Updated: 06/11/24 1337    Narrative:      The  following orders were created for panel order CBC & Differential.  Procedure                               Abnormality         Status                     ---------                               -----------         ------                     CBC Auto Differential[[id number]]        Abnormal            Final result               Scan Slide[[id number]]                                       Final result                 Please view results for these tests on the individual orders.    CBC Auto Differential [[id number]]  (Abnormal) Collected: 06/11/24 1232    Specimen: Blood Updated: 06/11/24 1337     WBC 38.57 10*3/mm3      RBC 2.67 10*6/mm3      Hemoglobin 7.8 g/dL      Hematocrit 24.7 %      MCV 92.5 fL      MCH 29.2 pg      MCHC 31.6 g/dL      RDW 13.2 %      RDW-SD 45.3 fl      MPV 9.9 fL      Platelets 534 10*3/mm3     Narrative:      The previously reported component NRBC is no longer being reported. Previous result was 0.1 /100 WBC (Reference Range: 0.0-0.2 /100 WBC) on 6/11/2024 at 1254 EDT.    Scan Slide [[id number]] Collected: 06/11/24 1232    Specimen: Blood Updated: 06/11/24 1337     Scan Slide --     Comment: See Manual Differential Results       Manual Differential [[id number]]  (Abnormal) Collected: 06/11/24 1232    Specimen: Blood Updated: 06/11/24 1337     Neutrophil % 89.0 %      Lymphocyte % 1.0 %      Monocyte % 3.0 %      Bands %  4.0 %      Metamyelocyte % 1.0 %      Promyelocyte % 2.0 %      Neutrophils Absolute 35.87 10*3/mm3      Lymphocytes Absolute 0.39 10*3/mm3      Monocytes Absolute 1.16 10*3/mm3      Anisocytosis Slight/1+     Dexter Cells --     Comment: Observed          Hypochromia Slight/1+     Microcytes --     Comment: Observed          Poikilocytes Slight/1+     Polychromasia --     Comment: Observed          WBC Morphology Normal     Hypersegmented Neutrophils --     Comment: Observed          Platelet Estimate Increased     Clumped Platelets --     Comment: Observed          Large  Platelets Slight/1+     Comment:           Giant Platelets --     Comment: Observed         Comprehensive Metabolic Panel [[id number]]  (Abnormal) Collected: 06/11/24 1232    Specimen: Blood Updated: 06/11/24 1324     Glucose 222 mg/dL      BUN 65 mg/dL      Creatinine 5.03 mg/dL      Sodium 130 mmol/L      Potassium 3.7 mmol/L      Chloride 92 mmol/L      CO2 9.3 mmol/L      Calcium 9.3 mg/dL      Total Protein 7.1 g/dL      Albumin 3.1 g/dL      ALT (SGPT) 8 U/L      AST (SGOT) 28 U/L      Alkaline Phosphatase 186 U/L      Total Bilirubin 0.4 mg/dL      Globulin 4.0 gm/dL      A/G Ratio 0.8 g/dL      BUN/Creatinine Ratio 12.9     Anion Gap 28.7 mmol/L      eGFR 13.0 mL/min/1.73      Comment: <15 Indicative of kidney failure       Narrative:      GFR Normal >60  Chronic Kidney Disease <60  Kidney Failure <15      Single High Sensitivity Troponin T [[id number]]  (Abnormal) Collected: 06/11/24 1232    Specimen: Blood Updated: 06/11/24 1324     HS Troponin T 444 ng/L     Narrative:      High Sensitive Troponin T Reference Range:  <14.0 ng/L- Negative Female for AMI  <22.0 ng/L- Negative Male for AMI  >=14 - Abnormal Female indicating possible myocardial injury.  >=22 - Abnormal Male indicating possible myocardial injury.   Clinicians would have to utilize clinical acumen, EKG, Troponin, and serial changes to determine if it is an Acute Myocardial Infarction or myocardial injury due to an underlying chronic condition.         Lactic Acid, Plasma [[id number]]  (Abnormal) Collected: 06/11/24 1232    Specimen: Blood Updated: 06/11/24 1324     Lactate 2.3 mmol/L     Magnesium [[id number]]  (Normal) Collected: 06/11/24 1232    Specimen: Blood Updated: 06/11/24 1307     Magnesium 1.7 mg/dL     Urinalysis, Microscopic Only - Urine, Clean Catch [[id number]]  (Abnormal) Collected: 06/11/24 1232    Specimen: Urine, Clean Catch Updated: 06/11/24 1303     RBC, UA 0-2 /HPF      WBC, UA 3-5 /HPF      Bacteria, UA Trace /HPF      Squamous  Epithelial Cells, UA 0-2 /HPF      Hyaline Casts, UA None Seen /LPF      Methodology Manual Light Microscopy    Urinalysis With Microscopic If Indicated (No Culture) - Urine, Clean Catch [[id number]]  (Abnormal) Collected: 06/11/24 1232    Specimen: Urine, Clean Catch Updated: 06/11/24 1256     Color, UA Yellow     Appearance, UA Clear     pH, UA 5.5     Specific Gravity, UA 1.014     Glucose,  mg/dL (1+)     Ketones, UA 15 mg/dL (1+)     Bilirubin, UA Negative     Blood, UA Negative     Protein, UA >=300 mg/dL (3+)     Leuk Esterase, UA Negative     Nitrite, UA Negative     Urobilinogen, UA 0.2 E.U./dL    Acetone [[id number]]  (Normal) Collected: 06/11/24 1232    Specimen: Blood Updated: 06/11/24 1248     Acetone Negative    POC Lactate [[id number]]  (Normal) Collected: 06/11/24 1242    Specimen: Arterial Blood Updated: 06/11/24 1245     Lactate 1.5 mmol/L      Comment: Serial Number: [id number]:  [id number]       POC Electrolyte Panel [[id number]]  (Abnormal) Collected: 06/11/24 1242    Specimen: Arterial Blood Updated: 06/11/24 1245     Sodium 129 mmol/L      POC Potassium 3.8 mmol/L      Chloride 106 mmol/L      Ionized Calcium 1.09 mmol/L      Comment: Serial Number: [id number]:  [id number]       POC Glucose Once [[id number]]  (Abnormal) Collected: 06/11/24 1242    Specimen: Blood Updated: 06/11/24 1245     Glucose 232 mg/dL      Comment: Serial Number: [id number]:  [id number]       Blood Gas, Arterial - [[id number]]  (Abnormal) Collected: 06/11/24 1242    Specimen: Arterial Blood Updated: 06/11/24 1245     Site Right Radial     Noam's Test Positive     pH, Arterial 7.350 pH units      pCO2, Arterial 14.9 mm Hg      pO2, Arterial 95.8 mm Hg      HCO3, Arterial 8.2 mmol/L      Base Excess, Arterial -15.6 mmol/L      Comment: Serial Number: [id number]:  [id number]        O2 Saturation, Arterial 97.4 %      Hemoglobin, Blood Gas 7.6 g/dL      Hematocrit, Blood Gas 22.0 %      Barometric Pressure for Blood Gas  744.7000 mmHg      Modality Room Air     Notified Who dr reddy     Read Back Yes     Notified Time --     Hemodilution No    [place] Draw [[id number]] Collected: 06/11/24 1232    Specimen: Blood Updated: 06/11/24 1238    Narrative:      The following orders were created for panel order [place] Draw.  Procedure                               Abnormality         Status                     ---------                               -----------         ------                     Green Top (Gel)[[id number]]                                  Final result               Lavender Top[[id number]]                                     Final result               Gold Top - SST[[id number]]                                   Final result               Light Blue Top[[id number]]                                   Final result                 Please view results for these tests on the individual orders.    Gold Top - SST [[id number]] Collected: 06/11/24 1232    Specimen: Blood Updated: 06/11/24 1238     Extra Tube Hold for add-ons.     Comment: Auto resulted.       Green Top (Gel) [[id number]] Collected: 06/11/24 1232    Specimen: Blood Updated: 06/11/24 1238     Extra Tube Hold for add-ons.     Comment: Auto resulted.       Light Blue Top [[id number]] Collected: 06/11/24 1232    Specimen: Blood Updated: 06/11/24 1238     Extra Tube Hold for add-ons.     Comment: Auto resulted       Lavender Top [[id number]] Collected: 06/11/24 1232    Specimen: Blood Updated: 06/11/24 1237     Extra Tube hold for add-on     Comment: Auto resulted               Imaging Results (Last 24 Hours)       Procedure Component Value Units Date/Time    XR Chest 1 View [[id number]] Collected: 06/11/24 1756     Updated: 06/11/24 1759    Narrative:      XR CHEST 1 VW    Date of Exam: 6/11/2024 5:44 PM EDT    Indication: post hd cath    Comparison: 6/11/2024 1255 hours    Findings:  New right IJ central line with the tip in the mid SVC. No pneumothorax. Stable enlargement of the  cardiac silhouette. Questionable mild pulmonary vascular congestion. No evidence of large pleural effusion. There are no focal areas of airspace   consolidation.      Impression:      Impression:  New right IJ central line in good position. No pneumothorax.      Electronically Signed: Tim Ramirez MD    6/11/2024 5:57 PM EDT    Workstation ID: [id number]    XR Foot 2 View Left [[id number]] Collected: 06/11/24 1416     Updated: 06/11/24 1426    Narrative:      XR FOOT 2 VW LEFT    Date of Exam: 6/11/2024 1:45 PM EDT    Indication: Cellulitis distal foot pain    Comparison: None available.    Findings:  There is a soft tissue ulcer along the plantar aspect of the medial left foot. 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 There is no osseous erosion at the distal aspect of the metatarsal amputation site. There are chronic appearing   changes at the distal second metatarsal and proximal phalanx of the second digit with callus formation. This likely relates to old trauma or infection. There is no new osseous erosion.      Impression:      Impression:  1. Soft tissue ulceration along the plantar aspect of the medial left foot. No osseous erosion to suggest radiographic osteomyelitis.  2. Chronic appearing changes at the distal second metatarsal and proximal phalanx of the second digit likely related to old trauma or old infection.      Electronically Signed: Elias Boland    6/11/2024 2:24 PM EDT    Workstation ID: [id number]    CT Head Without Contrast [[id number]] Collected: 06/11/24 1418     Updated: 06/11/24 1423    Narrative:      CT HEAD WO CONTRAST    Date of Exam: 6/11/2024 1:47 PM EDT    Indication: Altered mental status  headache.    Comparison: CT brain dated 9/12/2019    Technique: Axial CT images were obtained of the head without contrast administration.  Reconstructed coronal and sagittal images were also obtained. Automated exposure control and iterative construction methods were  used.      Findings: There is motion artifact limiting the exam  There is no evidence of hemorrhage. There is no mass effect or midline shift mild diffuse brain atrophy.    There is no extracerebral collection. No change in tiny round hyperdensity at the level of the roof of the third ventricle could represent a tiny colloid cyst or calcification    Ventricles are normal in size and configuration for patient's stated age.     Posterior fossa is within normal limits.    Calvarium and skull base appear intact.  Visualized sinuses show no air fluid levels. Visualized orbits are unremarkable.      Impression:      Impression:    No evidence of acute intracranial abnormality        Electronically Signed: Leo Figueredo MD    6/11/2024 2:21 PM EDT    Workstation ID: [id number]    XR Chest 1 View [[id number]] Collected: 06/11/24 1322     Updated: 06/11/24 1338    Narrative:      XR CHEST 1 VW    Date of Exam: 6/11/2024 1:04 PM EDT    Indication: Weak/Dizzy/AMS triage protocol    Comparison: May 16, 2022    Findings:  The heart is enlarged. There is no nubia consolidation or obvious pleural effusions.      Impression:      Impression:  1.Cardiomegaly suggested.      Electronically Signed: Memo Werner MD    6/11/2024 1:32 PM EDT    Workstation ID: [id number]            Labs and imaging noted above have been personally reviewed by me.    Scheduled Meds:ampicillin-sulbactam, 3 g, Intravenous, Q12H  epoetin holly/holly-epbx, 40,000 Units, Subcutaneous, Daily  famotidine, 20 mg, Intravenous, Daily  ferric gluconate, 250 mg, Intravenous, Daily  heparin (porcine), 5,000 Units, Subcutaneous, Q8H  nicotine, 1 patch, Transdermal, Q24H  senna-docusate sodium, 2 tablet, Oral, BID  sodium chloride, 10 mL, Intravenous, Q12H  sodium chloride, 10 mL, Intravenous, Q12H      Continuous Infusions:dexmedetomidine, 0.2-1.5 mcg/kg/hr, Last Rate: Stopped (06/12/24 1140)  dextrose 5 % and sodium chloride 0.45 %, 150 mL/hr  dextrose 5 % and sodium  chloride 0.45 % with KCl 20 mEq/L, 150 mL/hr, Last Rate: 150 mL/hr (06/12/24 0436)  dextrose 5 % and sodium chloride 0.45 % with KCl 40 mEq/L, 150 mL/hr  dextrose 5 % and sodium chloride 0.9 %, 150 mL/hr  dextrose 5 % and sodium chloride 0.9 % with KCl 20 mEq, 150 mL/hr, Last Rate: 150 mL/hr (06/12/24 1205)  dextrose 5% and sodium chloride 0.9% with KCl 40 mEq/L, 150 mL/hr  insulin, 0-100 Units/hr, Last Rate: 2.1 Units/hr (06/12/24 0844)  norepinephrine, 0.02-0.3 mcg/kg/min, Last Rate: Stopped (06/11/24 1622)  sodium chloride 0.45 % 1,000 mL with potassium chloride 40 mEq infusion, 250 mL/hr  sodium chloride, 250 mL/hr  sodium chloride 0.45 % with KCl 20 mEq, 250 mL/hr  sodium chloride, 250 mL/hr  sodium chloride 0.9 % with KCl 20 mEq, 250 mL/hr  sodium chloride 0.9 % with KCl 40 mEq/L, 250 mL/hr      PRN Meds:  senna-docusate sodium **AND** polyethylene glycol **AND** bisacodyl **AND** bisacodyl    Calcium Replacement - Follow Nurse / BPA Driven Protocol    dextrose    dextrose    dextrose 5 % and sodium chloride 0.45 %    dextrose 5 % and sodium chloride 0.45 % with KCl 20 mEq/L    dextrose 5 % and sodium chloride 0.45 % with KCl 40 mEq/L    dextrose 5 % and sodium chloride 0.9 %    dextrose 5 % and sodium chloride 0.9 % with KCl 20 mEq    dextrose 5% and sodium chloride 0.9% with KCl 40 mEq/L    glucagon (human recombinant)    hydrALAZINE    ipratropium-albuterol    Magnesium Standard Dose Replacement - Follow Nurse / BPA Driven Protocol    nitroglycerin    Phosphorus Replacement - Follow Nurse / BPA Driven Protocol    Potassium Replacement - Follow Nurse / BPA Driven Protocol    sodium bicarbonate 8.4 % 100 mEq in sterile water (preservative free) 500 mL infusion    sodium chloride 0.45 % 1,000 mL with potassium chloride 40 mEq infusion    sodium chloride    sodium chloride 0.45 % with KCl 20 mEq    sodium chloride    sodium chloride    sodium chloride    sodium chloride    sodium chloride 0.9 % with KCl 20  mEq    sodium chloride 0.9 % with KCl 40 mEq/L    ASSESSMENT/PLAN      DKA (diabetic ketoacidosis)    DKA  - , HCO3 9.3, AG 28.7, pH 7.35 but with pCO2 of 15 on admission  - AG has closed and acidosis improved but will leave on insulin gtt until out of critical phase    Septic shock 2/2 L diabetic foot wound  Streptococcal bacteremia  - WBC 38.5 on admission  - CXR without infiltrate, u/a uninfectious  - BCx 2/2 with GPCs -- strep by PCR  - repeat BCx now  - wound culture sent  - likely osteomyelitis but not a candidate for surgical intervention currently  - Vanc/Zosyn --> unasyn. Would continue broad spectrum until wound culture has resulted as could be polymicrobial infection  - podiatry to see    AHRF  - on BiPAP  - close to needing intubation  - Pulm following    ARF on CKD4/5  - Cr 5.03 on admission, baseline anywhere from 3.5-4  - on bicarb gtt  - Nephrology consulted    Metabolic encephalopathy  - related to all of the above    Elevated troponin  - hsTrop 444 --> 442  - likely related to all of the above  - check echo    Anemia  - [employer] and also does not want transfusion due to covid vaccine   - getting iron transfusion and epo  - d/c sub-q heparin  - ctm daily       D/w Dr Alexandra. Given his severe anemia with inability to transfuse along with severe, life-threatening foot infection that would most likely require amputation but not a surgical candidate given his current medical issues will ask Palliative to see to discuss GOC as his prognosis is extremely guarded    Nutrition - NPO Diet NPO Type: Strict NPO   DVT Prophylaxis - scds  Code Status - full   GI ppx - pepcid  Disposition - tbd       Kye Tavarez MD  [facility]  6/12/2024  12:29 EDT

## 2024-06-12 NOTE — CONSULTS
06/12/24 1442   Coping/Psychosocial   Additional Documentation Spiritual Care (Group)   Spiritual Care   Spiritual Care Source nurse referral   Spiritual Care Follow-Up will follow closely   Response to Spiritual Care thanks expressed   Spiritual Care Interventions supportive conversation provided;resource assistance provided   Spiritual Care Visit Type other (see comments)  (pt has decided that he wants to transition to CMO. Pt's mother shares that the patient has been declining medically for some time and does not have quality of life.)   Spiritual Care Request family support;end-of-life issue(s) support

## 2024-06-12 NOTE — SIGNIFICANT NOTE
06/12/24 0700   Physical Therapy Time and Intention   Session Not Performed   (Restraints, hgb 5.6, and pending podiatry consult)

## 2024-06-12 NOTE — PROGRESS NOTES
RT EQUIPMENT DEVICE RELATED - SKIN ASSESSMENT    RT Medical Equipment/Device:     NIV Mask:  Under-the-nose   size:  b    Skin Assessment:      Cheek:  Intact  Chin:  Intact  Neck:  Intact  Mouth:  Intact    Device Skin Pressure Protection:  Positioning supports utilized    Nurse Notification:  Radha Seay, RRT

## 2024-06-12 NOTE — PLAN OF CARE
Goal Outcome Evaluation:      Non healing wound to left foot. Recommendations from surgery include the need for blood transfusion and partial amputation of the left foot. Patient and family no not wish to have blood transfusion due to [employer] beliefs. Patients mother and father wish for patient to be DNR/DNI and potientially move to comfort care this evening after more family arrive. Remains on Precedex and Bipap. Increased FiO2 requirements this shift.

## 2024-06-12 NOTE — CONSULTS
Purpose of the visit was to evaluate for: goals of care/advanced care planning, support for patient/family, and withdrawal of interventions. Spoke with RN and family and discussed palliative care, goals of care, clarify code status, and determination of decision maker.      Assessment:  Mr Dee is a patient in the CCU. He is on the bipap and is on a precidex gtt for light sedation. He is unable to respond at this time. His mother is at bedside. She tells me they are focused on transition to comfort focus care.    She tells me there are no ACP documents and the patient does have 3 daughter, 1 is estranged, one is unable to make decisions and 1 may visit [facility]. I have ask for the daughters phone number to confirm they are ok with the patients parents to make decisions. Nurse is aware of the need to confirm the above.     Recommendations/Plan: .Plans to transition to comfort focus care after family visits this afternoon    Tasks Completed: Code Status clarification and Emotional Support.    Palliative care will continue to follow and support  Caren TITUS RN, BSN  Palliative Care.

## 2024-06-12 NOTE — SIGNIFICANT NOTE
06/12/24 0807   OTHER   Discipline occupational therapist   Rehab Time/Intention   Session Not Performed unable to evaluate, medical status change  (Restraints and 5.6 hgb)

## 2024-06-12 NOTE — CONSULTS
[facility]   HISTORY AND PHYSICAL    Patient Name: Rogelio Dee  : 1970  MRN: [id number]  Primary Care Physician:  Simeon Lomeli MD  Date of admission: 2024    Subjective   Subjective     Chief Complaint: Altered mental status     HPI: Patient is a 54-year-old man that presents with altered mental status to the emergency room.  Patient is septic.  Unable to get any history from him due to the patient being sedated and being ventilated via BiPAP.     On arrival to the ED, patient temperature 100.3, pulse 125, respiratory rate of 32, blood pressure 152/96 and then subsequent blood pressures trended down, SpO2 97%.     CT head was normal.        Chest x-ray shows cardiomegaly.    Upon speaking with Dr. Viktor Alexandra, pulmonary/intensivist, and the patient's nurse; the patient is currently sedated and utilizing BiPAP.  Patient has multiple drips at this time and is agitated and unable to have MRI of his left foot performed.  The patient's current hemoglobin is 5.6.  Dr. Alexandra states that he discussed with the patient prior to having been given sedation whether he wanted blood transfusions.  The patient states he did not want any blood transfusions due to his Orthodoxy believes as a Taoist.  Dr. Alexandra states that he discussed with the patient that this decision will most likely cause him to get worse and unable to get surgery on his left foot.      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  Missed appt in [place] for PRP, partially because of ride issues and partially because he is hesitant to undergo PRP because it is uncomfortable.  Avastin today.  Discussed retrobulbar for PRP vs. Treatment with anti-VEGF exclusively.  Pt will consider options.  If doing PRP, will try to get done in 1 session in order to minimize trips to [place].  PDR OS -       Past Surgical History:   Procedure Laterality Date    LUMBAR DISC SURGERY      TOE AMPUTATION         Family History: family history includes Diabetes in his mother and paternal grandmother. Otherwise pertinent FHx was reviewed and not pertinent to current issue.    Social History:  reports that he has been smoking cigarettes. He has a 30 pack-year smoking history. He has been exposed to tobacco smoke. He has never used smokeless tobacco. He reports that he does not drink alcohol and does not use drugs.    Home Medications:  [employer]ocate Blood Glucose Monitor, Cariprazine HCl, FreeStyle Shawna 3 Sensor, FreeStyle Lite, HYDROcodone-acetaminophen, NIFEdipine XL, Vitamin D3, aspirin, atorvastatin, carvedilol, citalopram, cloNIDine, cyclobenzaprine, dorzolamide-timolol, ferrous sulfate, freestyle, furosemide, glucose blood, hydrALAZINE, insulin degludec, nicotine polacrilex, omeprazole, rOPINIRole, sodium bicarbonate, and tamsulosin      Allergies:  Allergies   Allergen Reactions    Kayexalate [Sodium Polystyrene Sulfonate] GI Intolerance     \"Can not make it to the bathroom fast enough\"    Oxycodone Mental Status Change and Other (See Comments)       Objective   Objective     Vitals:   Temp:  [97 °F (36.1 °C)-98.6 °F (37 °C)] 98.2 °F (36.8 °C)  Heart Rate:  [] 74  Resp:  [17-32] 26  BP: ()/() 106/69  Flow (L/min):  [3-15] 3    WBC   Date Value Ref Range Status   06/12/2024 23.80 (H) 3.40 - 10.80 10*3/mm3 Final     RBC   Date Value Ref Range Status   06/12/2024 1.89 (L) 4.14 - 5.80 10*6/mm3 Final     Hemoglobin "   Date Value Ref Range Status   06/12/2024 5.6 (C) 13.0 - 17.7 g/dL Final     Hematocrit   Date Value Ref Range Status   06/12/2024 16.8 (C) 37.5 - 51.0 % Final     MCV   Date Value Ref Range Status   06/12/2024 88.9 79.0 - 97.0 fL Final     MCH   Date Value Ref Range Status   06/12/2024 29.6 26.6 - 33.0 pg Final     MCHC   Date Value Ref Range Status   06/12/2024 33.3 31.5 - 35.7 g/dL Final     RDW   Date Value Ref Range Status   06/12/2024 13.2 12.3 - 15.4 % Final     RDW-SD   Date Value Ref Range Status   06/12/2024 43.1 37.0 - 54.0 fl Final     MPV   Date Value Ref Range Status   06/12/2024 9.8 6.0 - 12.0 fL Final     Platelets   Date Value Ref Range Status   06/12/2024 369 140 - 450 10*3/mm3 Final     Neutrophil %   Date Value Ref Range Status   06/12/2024 91.7 (H) 42.7 - 76.0 % Final     Lymphocyte %   Date Value Ref Range Status   06/12/2024 2.6 (L) 19.6 - 45.3 % Final     Monocyte %   Date Value Ref Range Status   06/12/2024 2.6 (L) 5.0 - 12.0 % Final     Eosinophil %   Date Value Ref Range Status   06/12/2024 0.6 0.3 - 6.2 % Final     Basophil %   Date Value Ref Range Status   06/12/2024 0.4 0.0 - 1.5 % Final     Immature Grans %   Date Value Ref Range Status   06/12/2024 2.1 (H) 0.0 - 0.5 % Final     Neutrophils, Absolute   Date Value Ref Range Status   06/12/2024 21.84 (H) 1.70 - 7.00 10*3/mm3 Final     Lymphocytes, Absolute   Date Value Ref Range Status   06/12/2024 0.61 (L) 0.70 - 3.10 10*3/mm3 Final     Monocytes, Absolute   Date Value Ref Range Status   06/12/2024 0.61 0.10 - 0.90 10*3/mm3 Final     Eosinophils, Absolute   Date Value Ref Range Status   06/12/2024 0.14 0.00 - 0.40 10*3/mm3 Final     Basophils, Absolute   Date Value Ref Range Status   06/12/2024 0.10 0.00 - 0.20 10*3/mm3 Final     Immature Grans, Absolute   Date Value Ref Range Status   06/12/2024 0.50 (H) 0.00 - 0.05 10*3/mm3 Final     nRBC   Date Value Ref Range Status   06/12/2024 0.1 0.0 - 0.2 /100 WBC Final     Lab Results    Component Value Date    GLUCOSE 158 (H) 2024    BUN 62 (H) 2024    CREATININE 4.02 (H) 2024    EGFR 16.9 (L) 2024    BCR 15.4 2024    K 4.1 2024    CO2 22.9 2024    CALCIUM 7.4 (L) 2024    ALBUMIN 2.7 (L) 2024    BILITOT 0.2 2024    AST 28 2024    ALT 8 2024         Physical Exam      GEN:   Sedated and being ventilated via BiPAP.    Physical Exam  Vitals reviewed.   Constitutional:       Appearance: He is ill-appearing.   Cardiovascular:      Pulses:           Dorsalis pedis pulses are 2+ on the right side and 2+ on the left side.        Posterior tibial pulses are 2+ on the right side and 2+ on the left side.   Feet:      Right foot:      Skin integrity: Warmth present.      Left foot:      Skin integrity: Ulcer and warmth present.         Foot/Ankle Exam    GENERAL  Appearance:  appears ill  Orientation:  unable to assess    VASCULAR     Right Foot Vascularity   Dorsalis pedis:  2+  Posterior tibial:  2+  Skin temperature:  warm  Edema gradin+  CFT:  < 3 seconds  Pedal hair growth:  Present  Varicosities:  mild varicosities     Left Foot Vascularity   Dorsalis pedis:  2+  Posterior tibial:  2+  Skin temperature:  warm  Edema gradin+  CFT:  < 3 seconds  Pedal hair growth:  Present  Varicosities:  mild varicosities     NEUROLOGIC     Right Foot Neurologic   Right ankle sensation: Unable to assess due to sedation but the patient has had neuropathy on exam and past.     Left Foot Neurologic   Left ankle sensation: Unable to assess due to sedation but the patient has had neuropathy on exam and past.    MUSCULOSKELETAL     Right Foot Musculoskeletal    Amputation   Toes amputated: first toe     Left Foot Musculoskeletal    Amputation   Toes amputated: first toe    DERMATOLOGIC        Left Foot Dermatologic   Skin  Positive for ulcer.      Left foot additional comments: Ulceration on the plantar aspect of the first ray area and second  metatarsal head area grossly probes to bone and are malodorous.  Serous drainage present.  Local edema and mild erythema.  No lymphangitis.  No fluctuance.    Duplex Lower Extremity Art / Grafts - Left CAR    Result Date: 6/11/2024  Narrative:   Normal ankle-brachial index bilaterally   Patent left common femoral, profunda femoris, and superficial femoral arteries without significant stenosis.  There is mild to moderate common femoral artery velocity elevations but not of hemodynamic significance.  Moderate diffuse atherosclerotic plaque is present throughout.   Patent left popliteal and tibioperoneal trunk without significant stenosis.  Moderately heavy atherosclerotic plaque is present.   Patent left tibial runoff arteries without significant stenosis.     Duplex Venous Lower Extremity - Bilateral CV-READ    Result Date: 6/11/2024  Narrative:   Normal bilateral lower extremity venous duplex scan.     XR Foot 2 View Left    Result Date: 6/11/2024  Narrative: XR FOOT 2 VW LEFT Date of Exam: 6/11/2024 1:45 PM EDT Indication: Cellulitis distal foot pain Comparison: None available. Findings: There is a soft tissue ulcer along the plantar aspect of the medial left foot. 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 There is no osseous erosion at the distal aspect of the metatarsal amputation site. There are chronic appearing changes at the distal second metatarsal and proximal phalanx of the second digit with callus formation. This likely relates to old trauma or infection. There is no new osseous erosion.     Impression: Impression: 1. Soft tissue ulceration along the plantar aspect of the medial left foot. No osseous erosion to suggest radiographic osteomyelitis. 2. Chronic appearing changes at the distal second metatarsal and proximal phalanx of the second digit likely related to old trauma or old infection. Electronically Signed: Elias Boland  6/11/2024 2:24 PM EDT  Workstation ID:  [id number]    Result Review    Result Review:  I have personally reviewed the results from the time of this admission to 6/12/2024 16:16 EDT and agree with these findings:    [x]  Laboratory  []  Microbiology  [x]  Radiology  []  EKG/Telemetry   []  Cardiology/Vascular   []  Pathology  []  Old records  []  Other:      WBC   Date Value Ref Range Status   06/12/2024 23.80 (H) 3.40 - 10.80 10*3/mm3 Final     RBC   Date Value Ref Range Status   06/12/2024 1.89 (L) 4.14 - 5.80 10*6/mm3 Final     Hemoglobin   Date Value Ref Range Status   06/12/2024 5.6 (C) 13.0 - 17.7 g/dL Final     Hematocrit   Date Value Ref Range Status   06/12/2024 16.8 (C) 37.5 - 51.0 % Final     MCV   Date Value Ref Range Status   06/12/2024 88.9 79.0 - 97.0 fL Final     MCH   Date Value Ref Range Status   06/12/2024 29.6 26.6 - 33.0 pg Final     MCHC   Date Value Ref Range Status   06/12/2024 33.3 31.5 - 35.7 g/dL Final     RDW   Date Value Ref Range Status   06/12/2024 13.2 12.3 - 15.4 % Final     RDW-SD   Date Value Ref Range Status   06/12/2024 43.1 37.0 - 54.0 fl Final     MPV   Date Value Ref Range Status   06/12/2024 9.8 6.0 - 12.0 fL Final     Platelets   Date Value Ref Range Status   06/12/2024 369 140 - 450 10*3/mm3 Final     Neutrophil %   Date Value Ref Range Status   06/12/2024 91.7 (H) 42.7 - 76.0 % Final     Lymphocyte %   Date Value Ref Range Status   06/12/2024 2.6 (L) 19.6 - 45.3 % Final     Monocyte %   Date Value Ref Range Status   06/12/2024 2.6 (L) 5.0 - 12.0 % Final     Eosinophil %   Date Value Ref Range Status   06/12/2024 0.6 0.3 - 6.2 % Final     Basophil %   Date Value Ref Range Status   06/12/2024 0.4 0.0 - 1.5 % Final     Immature Grans %   Date Value Ref Range Status   06/12/2024 2.1 (H) 0.0 - 0.5 % Final     Neutrophils, Absolute   Date Value Ref Range Status   06/12/2024 21.84 (H) 1.70 - 7.00 10*3/mm3 Final     Lymphocytes, Absolute   Date Value Ref Range Status   06/12/2024 0.61 (L) 0.70 - 3.10 10*3/mm3 Final      Monocytes, Absolute   Date Value Ref Range Status   06/12/2024 0.61 0.10 - 0.90 10*3/mm3 Final     Eosinophils, Absolute   Date Value Ref Range Status   06/12/2024 0.14 0.00 - 0.40 10*3/mm3 Final     Basophils, Absolute   Date Value Ref Range Status   06/12/2024 0.10 0.00 - 0.20 10*3/mm3 Final     Immature Grans, Absolute   Date Value Ref Range Status   06/12/2024 0.50 (H) 0.00 - 0.05 10*3/mm3 Final     nRBC   Date Value Ref Range Status   06/12/2024 0.1 0.0 - 0.2 /100 WBC Final        Lab Results   Component Value Date    GLUCOSE 158 (H) 06/12/2024    BUN 62 (H) 06/12/2024    CREATININE 4.02 (H) 06/12/2024    EGFRIFNONA 26 (L) 07/01/2021    BCR 15.4 06/12/2024    K 4.1 06/12/2024    CO2 22.9 06/12/2024    CALCIUM 7.4 (L) 06/12/2024    ALBUMIN 2.7 (L) 06/11/2024    LABIL2 0.7 (L) 04/05/2021    AST 28 06/11/2024    ALT 8 06/11/2024        Assessment & Plan   Assessment / Plan       Active Hospital Problems:  Active Hospital Problems    Diagnosis     **DKA (diabetic ketoacidosis)     Anemia in other chronic diseases classified elsewhere     Subacute osteomyelitis of left foot     Amputation of left great toe     Amputation of right great toe     Neuropathy     Foot pain, left        Plan:     Comprehensive lower extremity examination and evaluation was performed.    Discussed findings and treatment plan including risks, benefits, and treatment options with patient's parents in detail. Patient's parents agreed with treatment plan.    Dr. Farias discussed at length with the patient's parents concerning his podiatric issues.  This included the patient's choice to not receive blood.  Due to this choice, the left transmetatarsal amputation that is required for the patient cannot be performed due to the likelihood of worsening his anemia from surgical blood loss.  Not having this surgery will most likely lead to the patient's condition decompensating further due to septicemia with death as the consequence.  The  patient's parents state understanding that these consequences are likely to occur due to the patient's decisions.    This conversation with the parents was discussed with the patient's nurse and with Dr. Alexandra.    Please contact me if the situation changes with the patient's decision to receive blood.    Thank you for including me in the care of this patient.    VTE Prophylaxis:  No VTE prophylaxis order currently exists.        CODE STATUS:    Medical Intervention Limits: No intubation (DNI)  Level Of Support Discussed With: Next of Kin (If No Surrogate)  Code Status (Patient has no pulse and is not breathing): No CPR (Do Not Attempt to Resuscitate)  Medical Interventions (Patient has pulse or is breathing): Limited Support      Electronically signed by Chai Farias DPM, 06/12/24, 1:02 PM EDT.

## 2024-06-12 NOTE — CONSULTS
Patient Care Team:  Simeon Lomeli MD as PCP - General (Internal Medicine)  Ira Larson APRN as Nurse Practitioner (Endocrinology)  Zakiya Pizarro, CHRISITANO as Ambulatory  ([facility])  Skylar Sharma MSW as  ([facility]) ([facility])    Chief complaint: CKD4    Subjective     History of Present Illness  53yo with h/o CKD4 followed with Dr. Hutson in our group.  He had more recent creatinine of 3.9.  He presented to ER with confusion and in sepsis.  He had severe acidosis and we were asked to see him.  Seen earlier today.    Review of Systems   Unable to obtain.    Past Medical History:   Diagnosis Date    Blindness of left eye     Chronic diastolic CHF 07/15/2021    Chronic pain syndrome     Diabetes mellitus, type 2     Diabetic neuropathy 07/15/2021    Hyperlipidemia     Hypertension     Lumbosacral disc herniation     L5-S1    Metabolic acidosis     Smoker 07/15/2021    Stage 4 CKD 07/15/2021    Status post amputation of great toe, left     Status post amputation of great toe, right     Type 2 DM with proliferative retinopathy with macular edema, bilateral 03/24/2021    PDR OD - New VH since last visit.  Missed appt in [place] for PRP, partially because of ride issues and partially because he is hesitant to undergo PRP because it is uncomfortable.  Avastin today.  Discussed retrobulbar for PRP vs. Treatment with anti-VEGF exclusively.  Pt will consider options.  If doing PRP, will try to get done in 1 session in order to minimize trips to [place].  PDR OS -   ,   Past Surgical History:   Procedure Laterality Date    LUMBAR DISC SURGERY      TOE AMPUTATION     ,   Family History   Problem Relation Age of Onset    Diabetes Mother     Diabetes Paternal Grandmother    ,   Social History     Socioeconomic History    Marital status:    Tobacco Use    Smoking status: Every Day     Current packs/day: 1.00     Average packs/day: 1 pack/day for 30.0 years  (30.0 ttl pk-yrs)     Types: Cigarettes     Passive exposure: Current    Smokeless tobacco: Never    Tobacco comments:     1 PACK A DAY   Vaping Use    Vaping status: Former    Substances: Nicotine    Passive vaping exposure: Yes   Substance and Sexual Activity    Alcohol use: Never    Drug use: Never    Sexual activity: Defer     E-cigarette/Vaping    E-cigarette/Vaping Use Former User     Passive Exposure Yes     Counseling Given No      E-cigarette/Vaping Substances    Nicotine Yes     THC No     CBD No     Flavoring No      E-cigarette/Vaping Devices         ,   Medications Prior to Admission   Medication Sig Dispense Refill Last Dose    aspirin 81 MG EC tablet TAKE 1 TABLET BY MOUTH EVERY DAY 90 tablet 1 Unknown    atorvastatin (LIPITOR) 20 MG tablet TAKE 1 TABLET BY MOUTH DAILY 90 tablet 2 Unknown    Blood Glucose Monitoring Suppl (Advocate Blood Glucose Monitor) device Use 1 Units 4 (Four) Times a Day Before Meals & at Bedtime. 1 each 0 Unknown    Blood Glucose Monitoring Suppl (FreeStyle Lite) w/Device kit by Other route 4 (Four) Times a Day. use to test blood sugar   Unknown    Cariprazine HCl (Vraylar) 1.5 MG capsule capsule Take 1 capsule by mouth Daily. 30 capsule 1 Unknown    carvedilol (COREG) 25 MG tablet TAKE 1 TABLET BY MOUTH TWICE DAILY WITH MEALS (Patient taking differently: Take 1 tablet by mouth 2 (Two) Times a Day With Meals.) 180 tablet 2 Unknown    Cholecalciferol (Vitamin D3) 50 MCG (2000 UT) capsule TAKE 1 CAPSULE BY MOUTH EVERY DAY 90 capsule 3 Unknown    citalopram (CeleXA) 40 MG tablet TAKE 1 TABLET BY MOUTH DAILY 90 tablet 2 Unknown    cloNIDine (CATAPRES) 0.1 MG tablet Take 2 tablets by mouth 2 (Two) Times a Day. 360 tablet 3 Unknown    Continuous Blood Gluc Sensor (FreeStyle Shawna 3 Sensor) [place] Use 1 each Every 14 (Fourteen) Days. 2 each 5 Unknown    cyclobenzaprine (FLEXERIL) 10 MG tablet Take 1 tablet by mouth Every 8 (Eight) Hours.   Unknown    dorzolamide-timolol (Cosopt) 2-0.5 %  ophthalmic solution Administer 1 drop to both eyes 2 (Two) Times a Day. 10 mL 12 Unknown    ferrous sulfate 325 (65 FE) MG tablet Take 1 tablet by mouth 2 (Two) Times a Day.   Unknown    furosemide (LASIX) 40 MG tablet TAKE 1 TABLET BY MOUTH DAILY 90 tablet 2 Unknown    glucose blood test strip Use as instructed 100 each 12 Unknown    hydrALAZINE (APRESOLINE) 100 MG tablet TAKE 1 TABLET BY MOUTH THREE TIMES DAILY (Patient taking differently: Take 1 tablet by mouth 3 (Three) Times a Day.) 270 tablet 2 Unknown    HYDROcodone-acetaminophen (NORCO) 5-325 MG per tablet Take 1 tablet by mouth Every 6 (Six) Hours.   Unknown    insulin degludec (TRESIBA FLEXTOUCH) 100 UNIT/ML solution pen-injector injection Inject 10 Units under the skin into the appropriate area as directed Daily. Indications: Type 2 Diabetes   Unknown    Lancets (freestyle) lancets Check blood sugar every morning (AM fasting) as well as before lunch and dinner and before bedtime 100 each 12 Unknown    nicotine polacrilex (NICORETTE) 4 MG gum Chew 1 each As Needed for Smoking Cessation. 100 each 2 Unknown    NIFEdipine XL (PROCARDIA XL) 30 MG 24 hr tablet TAKE 2 TABLETS BY MOUTH EVERY DAY 60 tablet 0 Unknown    omeprazole (priLOSEC) 40 MG capsule TAKE 1 CAPSULE BY MOUTH DAILY 90 capsule 2 Unknown    rOPINIRole (REQUIP) 0.25 MG tablet TAKE 1 TABLET BY MOUTH EVERY NIGHT 1 HOUR BEFORE BEDTIME (Patient taking differently: Take 1 tablet by mouth Every Night.) 90 tablet 2 Unknown    sodium bicarbonate 650 MG tablet Take 1 tablet by mouth 3 (Three) Times a Day. 270 tablet 2 Unknown    tamsulosin (FLOMAX) 0.4 MG capsule 24 hr capsule TAKE 1 CAPSULE BY MOUTH DAILY 90 capsule 2 Unknown   , Scheduled Meds:  ampicillin-sulbactam, 3 g, Intravenous, Q12H  epoetin holly/holly-epbx, 40,000 Units, Subcutaneous, Daily  famotidine, 20 mg, Intravenous, Daily  ferric gluconate, 250 mg, Intravenous, Daily  nicotine, 1 patch, Transdermal, Q24H  senna-docusate sodium, 2 tablet,  Oral, BID  sodium chloride, 10 mL, Intravenous, Q12H  sodium chloride, 10 mL, Intravenous, Q12H   , Continuous Infusions:  dexmedetomidine, 0.2-1.5 mcg/kg/hr, Last Rate: Stopped (06/12/24 1140)  dextrose 5 % and sodium chloride 0.45 %, 150 mL/hr  dextrose 5 % and sodium chloride 0.45 % with KCl 20 mEq/L, 150 mL/hr, Last Rate: 150 mL/hr (06/12/24 0436)  dextrose 5 % and sodium chloride 0.45 % with KCl 40 mEq/L, 150 mL/hr  dextrose 5 % and sodium chloride 0.9 %, 150 mL/hr  dextrose 5 % and sodium chloride 0.9 % with KCl 20 mEq, 150 mL/hr, Last Rate: 150 mL/hr (06/12/24 1205)  dextrose 5% and sodium chloride 0.9% with KCl 40 mEq/L, 150 mL/hr  insulin, 0-100 Units/hr, Last Rate: 2.1 Units/hr (06/12/24 0844)  norepinephrine, 0.02-0.3 mcg/kg/min, Last Rate: Stopped (06/11/24 1622)  sodium chloride 0.45 % 1,000 mL with potassium chloride 40 mEq infusion, 250 mL/hr  sodium chloride, 250 mL/hr  sodium chloride 0.45 % with KCl 20 mEq, 250 mL/hr  sodium chloride, 250 mL/hr  sodium chloride 0.9 % with KCl 20 mEq, 250 mL/hr  sodium chloride 0.9 % with KCl 40 mEq/L, 250 mL/hr   , PRN Meds:    senna-docusate sodium **AND** polyethylene glycol **AND** bisacodyl **AND** bisacodyl    Calcium Replacement - Follow Nurse / BPA Driven Protocol    dextrose    dextrose    dextrose 5 % and sodium chloride 0.45 %    dextrose 5 % and sodium chloride 0.45 % with KCl 20 mEq/L    dextrose 5 % and sodium chloride 0.45 % with KCl 40 mEq/L    dextrose 5 % and sodium chloride 0.9 %    dextrose 5 % and sodium chloride 0.9 % with KCl 20 mEq    dextrose 5% and sodium chloride 0.9% with KCl 40 mEq/L    glucagon (human recombinant)    hydrALAZINE    ipratropium-albuterol    Magnesium Standard Dose Replacement - Follow Nurse / BPA Driven Protocol    nitroglycerin    Phosphorus Replacement - Follow Nurse / BPA Driven Protocol    Potassium Replacement - Follow Nurse / BPA Driven Protocol    sodium bicarbonate 8.4 % 100 mEq in sterile water (preservative  free) 500 mL infusion    sodium chloride 0.45 % 1,000 mL with potassium chloride 40 mEq infusion    sodium chloride    sodium chloride 0.45 % with KCl 20 mEq    sodium chloride    sodium chloride    sodium chloride    sodium chloride    sodium chloride 0.9 % with KCl 20 mEq    sodium chloride 0.9 % with KCl 40 mEq/L, and Allergies:  Kayexalate [sodium polystyrene sulfonate] and Oxycodone    Objective     Vital Signs  Temp:  [97 °F (36.1 °C)-100 °F (37.8 °C)] 98.4 °F (36.9 °C)  Heart Rate:  [] 81  Resp:  [17-32] 26  BP: ()/() 101/65    No intake/output data recorded.  I/O last 3 completed shifts:  In: 4386.5 [I.V.:3286.5; IV Piggyback:1100]  Out: 280 [Urine:280]    Physical Exam  Constitutional:       Appearance: He is ill-appearing and toxic-appearing.      Comments: On bipap   HENT:      Head: Normocephalic.      Mouth/Throat:      Mouth: Mucous membranes are moist.   Eyes:      General: No scleral icterus.     Pupils: Pupils are equal, round, and reactive to light.   Cardiovascular:      Rate and Rhythm: Normal rate and regular rhythm.      Pulses: Normal pulses.      Heart sounds: Normal heart sounds.   Pulmonary:      Effort: Respiratory distress present.   Abdominal:      General: Abdomen is flat.      Palpations: Abdomen is soft.   Musculoskeletal:         General: Normal range of motion.      Right lower leg: No edema.      Left lower leg: No edema.   Skin:     General: Skin is warm.   Neurological:      General: No focal deficit present.         Results Review:    I reviewed the patient's new clinical results.  I reviewed the patient's new imaging results and agree with the interpretation.  I reviewed the patient's other test results and agree with the interpretation    WBC WBC   Date Value Ref Range Status   06/12/2024 23.80 (H) 3.40 - 10.80 10*3/mm3 Final   06/11/2024 35.06 (C) 3.40 - 10.80 10*3/mm3 Final   06/11/2024 38.57 (C) 3.40 - 10.80 10*3/mm3 Final      HGB Hemoglobin   Date Value  "Ref Range Status   06/12/2024 5.6 (C) 13.0 - 17.7 g/dL Final   06/11/2024 7.1 (L) 13.0 - 17.7 g/dL Final   06/11/2024 7.8 (L) 13.0 - 17.7 g/dL Final      HCT Hematocrit   Date Value Ref Range Status   06/12/2024 16.8 (C) 37.5 - 51.0 % Final   06/11/2024 22.6 (L) 37.5 - 51.0 % Final   06/11/2024 24.7 (L) 37.5 - 51.0 % Final      Platlets No results found for: \"LABPLAT\"   MCV MCV   Date Value Ref Range Status   06/12/2024 88.9 79.0 - 97.0 fL Final   06/11/2024 95.4 79.0 - 97.0 fL Final   06/11/2024 92.5 79.0 - 97.0 fL Final          Sodium Sodium   Date Value Ref Range Status   06/12/2024 135 (L) 136 - 145 mmol/L Final   06/12/2024 133 (L) 136 - 145 mmol/L Final   06/12/2024 135 (L) 136 - 145 mmol/L Final   06/12/2024 135 (L) 136 - 145 mmol/L Final   06/11/2024 138 136 - 145 mmol/L Final   06/11/2024 135 (L) 136 - 145 mmol/L Final   06/11/2024 135 (L) 136 - 145 mmol/L Final   06/11/2024 130 (L) 136 - 145 mmol/L Final      Potassium Potassium   Date Value Ref Range Status   06/12/2024 4.1 3.5 - 5.2 mmol/L Final   06/12/2024 3.7 3.5 - 5.2 mmol/L Final   06/12/2024 3.6 3.5 - 5.2 mmol/L Final   06/12/2024 3.5 3.5 - 5.2 mmol/L Final   06/11/2024 3.8 3.5 - 5.2 mmol/L Final   06/11/2024 3.9 3.5 - 5.2 mmol/L Final   06/11/2024 3.9 3.5 - 5.2 mmol/L Final   06/11/2024 3.7 3.5 - 5.2 mmol/L Final      Chloride Chloride   Date Value Ref Range Status   06/12/2024 98 98 - 107 mmol/L Final   06/12/2024 97 (L) 98 - 107 mmol/L Final   06/12/2024 102 98 - 107 mmol/L Final   06/12/2024 103 98 - 107 mmol/L Final   06/11/2024 105 98 - 107 mmol/L Final   06/11/2024 103 98 - 107 mmol/L Final   06/11/2024 103 98 - 107 mmol/L Final   06/11/2024 92 (L) 98 - 107 mmol/L Final      CO2 CO2   Date Value Ref Range Status   06/12/2024 22.9 22.0 - 29.0 mmol/L Final   06/12/2024 22.7 22.0 - 29.0 mmol/L Final   06/12/2024 20.1 (L) 22.0 - 29.0 mmol/L Final   06/12/2024 18.6 (L) 22.0 - 29.0 mmol/L Final   06/11/2024 13.1 (L) 22.0 - 29.0 mmol/L Final " "  06/11/2024 10.1 (L) 22.0 - 29.0 mmol/L Final   06/11/2024 10.1 (L) 22.0 - 29.0 mmol/L Final   06/11/2024 9.3 (C) 22.0 - 29.0 mmol/L Final      BUN BUN   Date Value Ref Range Status   06/12/2024 62 (H) 6 - 20 mg/dL Final   06/12/2024 62 (H) 6 - 20 mg/dL Final   06/12/2024 65 (H) 6 - 20 mg/dL Final   06/12/2024 66 (H) 6 - 20 mg/dL Final   06/11/2024 64 (H) 6 - 20 mg/dL Final   06/11/2024 64 (H) 6 - 20 mg/dL Final   06/11/2024 64 (H) 6 - 20 mg/dL Final   06/11/2024 65 (H) 6 - 20 mg/dL Final      Creatinine Creatinine   Date Value Ref Range Status   06/12/2024 4.02 (H) 0.76 - 1.27 mg/dL Final   06/12/2024 4.16 (H) 0.76 - 1.27 mg/dL Final   06/12/2024 4.17 (H) 0.76 - 1.27 mg/dL Final   06/12/2024 4.28 (H) 0.76 - 1.27 mg/dL Final   06/11/2024 4.42 (H) 0.76 - 1.27 mg/dL Final   06/11/2024 4.63 (H) 0.76 - 1.27 mg/dL Final   06/11/2024 4.63 (H) 0.76 - 1.27 mg/dL Final   06/11/2024 5.03 (H) 0.76 - 1.27 mg/dL Final      Calcium Calcium   Date Value Ref Range Status   06/12/2024 7.4 (L) 8.6 - 10.5 mg/dL Final   06/12/2024 7.4 (L) 8.6 - 10.5 mg/dL Final   06/12/2024 7.6 (L) 8.6 - 10.5 mg/dL Final   06/12/2024 7.6 (L) 8.6 - 10.5 mg/dL Final   06/11/2024 7.9 (L) 8.6 - 10.5 mg/dL Final   06/11/2024 7.9 (L) 8.6 - 10.5 mg/dL Final   06/11/2024 7.9 (L) 8.6 - 10.5 mg/dL Final   06/11/2024 9.3 8.6 - 10.5 mg/dL Final      PO4 No results found for: \"CAPO4\"   Albumin Albumin   Date Value Ref Range Status   06/11/2024 2.7 (L) 3.5 - 5.2 g/dL Final   06/11/2024 3.1 (L) 3.5 - 5.2 g/dL Final      Magnesium Magnesium   Date Value Ref Range Status   06/12/2024 1.9 1.6 - 2.6 mg/dL Final   06/12/2024 1.6 1.6 - 2.6 mg/dL Final   06/12/2024 1.6 1.6 - 2.6 mg/dL Final   06/12/2024 1.7 1.6 - 2.6 mg/dL Final   06/11/2024 1.4 (L) 1.6 - 2.6 mg/dL Final   06/11/2024 1.7 1.6 - 2.6 mg/dL Final   06/11/2024 1.7 1.6 - 2.6 mg/dL Final   06/11/2024 1.7 1.6 - 2.6 mg/dL Final      Uric Acid No results found for: \"URICACID\"         Assessment & Plan       DKA " (diabetic ketoacidosis)      Assessment & Plan  CKD4-  Creatinine around 3.9 last seen, creatinine around baseline.  UA bland with subnephrotic range proteinuria.  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  No acei/arb due to hyperkalemia previously.    Met Acidosis-  Improved on bicarb gtt. Appears resolved now.  Agree with IVF's changes.  Diabetic foot-  on IV abx.  Likely source of sepsis.  Unable to do amputation due to severe anemia, unable to get blood transfusion.  DKA-  on insulin gtt, improved.  HTN-  bp improved.  DMII-  with complications including retinopathy and neuropathy.    Sepsis-  Gram + bacteremia.  Anemia-  iron/epogen added per critical care.      I discussed the patients findings and my recommendations with patient, family, nursing staff, and consulting provider    Caden Rivero MD  06/12/24  13:44 EDT

## 2024-06-12 NOTE — PROGRESS NOTES
Reason for consultation critical care management    Patient critically ill in the ICU  On continuous BiPAP  On Precedex    Patient had Precedex weaned off this morning he does have significant anemia and is a Jehovah witness  Explained to the patient that he would likely die without blood transfusion as blood transfusion will allow him to have surgery to help control his sepsis due to his foot infection despite understanding the risk of not having blood transfusion the patient does vocalize that he would not want a transfusion even if that means death        Vitals:    06/12/24 0830 06/12/24 0845 06/12/24 0900 06/12/24 1140   BP: 119/74 129/70 101/65    BP Location:   Left arm    Patient Position:   Lying    Pulse: 99 98 81    Resp:       Temp: 98.2 °F (36.8 °C) 98.4 °F (36.9 °C) 98.4 °F (36.9 °C) 98.4 °F (36.9 °C)   TempSrc:   Bladder Bladder   SpO2: (!) 89% 91% 92%    Weight:       Height:         Ill-appearing male  On continuous BiPAP  Does have a very malodorous extremity  Does have tachypnea and  Is somnolent but easily arousable answer questions appropriately when Precedex is off      Assessment  Sepsis  Gram-positive bacteremia secondary to Streptococcus  Renal failure chronic kidney disease  Acidosis  DKA  Anemia  Diabetic foot wound  Respiratory failure-acute hypoxic respiratory failure requiring continuous BiPAP likely secondary to pulmonary edema    Plan  De-escalate antibiotics to Unasyn  Podiatry consulted  Nephrology consulted  Continue NIPPV for respiratory failure  Continue bicarbonate drip  Continue insulin drip  Norepinephrine to maintain mean arterial pressure 65  Place patient on Precedex due to agitation  Patient does have significant anemia which makes him not a surgical candidate at this time  Splane to him and his family that his anemia will prevent him from having surgery which will prevent him from being able to have control of his sepsis as I suspect he does have a gangrenous left  foot  Patient as well as the family both understand that a transfusion would allow him to have surgery which would allow him to have a sepsis control and he understands that without blood transfusion he may end up dying  We will start patient on IV iron as well as Epogen injections to try to increase his hemoglobin hematocrit given that he is Hindu  Replace magnesium with IV magnesium    Palliative care consult    Prognosis poor at this time    Total critical care time spent 45 minutes    Electronically signed by Feliz Alexandra DO, 06/12/24, 2:10 PM EDT.

## 2024-06-12 NOTE — NURSING NOTE
Patient has already been evaluated and seen by Podiatry. Podiatry is recommending Betadine dressing as patient is not currently a surgical candidate. Patient is likely to transition to comfort care measures. If patient does not transition to comfort care, wound care will follow. In the meantime, Betadine dressings daily recommended for drainage and odor management.   Jessa Fox RN

## 2024-06-13 NOTE — PROGRESS NOTES
"DAILY PROGRESS NOTE  HOSPITALIST GROUP      PATIENT IDENTIFICATION    Name: Rogelio Dee  :  1970  MRN: [id number]    CHIEF COMPLAINT/PRINCIPAL DIAGNOSIS: DKA (diabetic ketoacidosis)       SUBJECTIVE    Seen in bed, on BiPAP. Remains on bicarb gtt and bipap.     ROS:   Just states he is uncomfortable and sob     OBJECTIVE     Exam:  /89 (BP Location: Left arm, Patient Position: Lying)   Pulse 118   Temp 97.7 °F (36.5 °C) (Oral)   Resp 22   Ht 172.7 cm (68\")   Wt 80.5 kg (177 lb 7.5 oz)   SpO2 95%   BMI 26.98 kg/m²   Intake/Output last 24 hours:    Intake/Output Summary (Last 24 hours) at 2024 1230  Last data filed at 2024 0500  Gross per 24 hour   Intake 4520 ml   Output 950 ml   Net 3570 ml        Gen: looks uncomfortable, in bed  Resp: overall clear, + increased work of breathing  CV: tachy, no m/r/g. + peripheral edema  GI: Soft, nontender, (+) BS. nondistended  Psych: sedated, not oriented  Skin: warm and dry on palpation. No rash on inspection.  Neuro: answers some questions    DATA REVIEW:  Lab Results (last 24 hours)       Procedure Component Value Units Date/Time    Wound Culture - Wound, Foot, Left [[id number]]  (Abnormal) Collected: 24 1049    Specimen: Wound from Foot, Left Updated: 24 0903     Wound Culture Light growth (2+) Streptococcus, Beta Hemolytic, Group G     Comment:   This organism is considered to be universally susceptible to penicillin.  No further antibiotic testing will be performed. If Clindamycin or Erythromycin is the drug of choice, notify the laboratory within 7 days to request susceptibility testing.        Gram Stain No organisms seen    Blood Culture - Blood, Arm, Right [[id number]]  (Abnormal) Collected: 24 1232    Specimen: Blood from Arm, Right Updated: 24 0641     Blood Culture Gram Positive Cocci     Isolated from Aerobic and Anaerobic Bottles     Gram Stain Aerobic Bottle Gram positive cocci in chains      Anaerobic " Bottle Gram positive cocci in chains    Blood Culture - Blood, Arm, Left [[id number]]  (Abnormal) Collected: 06/11/24 1230    Specimen: Blood from Arm, Left Updated: 06/13/24 0641     Blood Culture Gram Positive Cocci     Isolated from Aerobic and Anaerobic Bottles     Gram Stain Anaerobic Bottle Gram positive cocci in chains      Aerobic Bottle Gram positive cocci in chains    POC Glucose Once [[id number]]  (Abnormal) Collected: 06/12/24 1943    Specimen: Blood Updated: 06/12/24 1945     Glucose 111 mg/dL      Comment: Serial Number: [id number]:  [id number]       POC Glucose Once [[id number]]  (Normal) Collected: 06/12/24 1816    Specimen: Blood Updated: 06/12/24 1817     Glucose 88 mg/dL      Comment: Serial Number: [id number]:  [id number]       POC Glucose Once [[id number]]  (Normal) Collected: 06/12/24 1740    Specimen: Blood Updated: 06/12/24 1742     Glucose 71 mg/dL      Comment: Serial Number: [id number]:  [id number]       POC Glucose Once [[id number]]  (Abnormal) Collected: 06/12/24 1600    Specimen: Blood Updated: 06/12/24 1602     Glucose 105 mg/dL      Comment: Serial Number: [id number]:  [id number]       POC Glucose Once [[id number]]  (Abnormal) Collected: 06/12/24 1455    Specimen: Blood Updated: 06/12/24 1458     Glucose 133 mg/dL      Comment: Serial Number: [id number]:  [id number]       POC Glucose Once [[id number]]  (Abnormal) Collected: 06/12/24 1350    Specimen: Blood Updated: 06/12/24 1352     Glucose 153 mg/dL      Comment: Serial Number: [id number]:  [id number]       POC Glucose Once [[id number]]  (Abnormal) Collected: 06/12/24 1250    Specimen: Blood Updated: 06/12/24 1252     Glucose 172 mg/dL      Comment: Serial Number: [id number]:  [id number]               Imaging Results (Last 24 Hours)       ** No results found for the last 24 hours. **            Labs and imaging noted above have been personally reviewed by me.    Scheduled Meds:famotidine, 20 mg,  Intravenous, Daily  ferric gluconate, 250 mg, Intravenous, Daily  nicotine, 1 patch, Transdermal, Q24H  senna-docusate sodium, 2 tablet, Oral, BID  sodium chloride, 10 mL, Intravenous, Q12H      Continuous Infusions:     PRN Meds:  acetaminophen **OR** acetaminophen **OR** acetaminophen    atropine    senna-docusate sodium **AND** polyethylene glycol **AND** bisacodyl **AND** bisacodyl    dextrose    dextrose    diphenoxylate-atropine    glucagon (human recombinant)    hydrALAZINE    ipratropium-albuterol    LORazepam **OR** midazolam **OR** midazolam **OR** midazolam **OR** LORazepam **OR** LORazepam    LORazepam **OR** midazolam **OR** midazolam **OR** midazolam **OR** LORazepam **OR** LORazepam    LORazepam **OR** midazolam **OR** midazolam **OR** midazolam **OR** LORazepam **OR** LORazepam    Morphine **OR** morphine    polyethyl glycol-propyl glycol    sodium chloride    sodium chloride    ASSESSMENT/PLAN      DKA (diabetic ketoacidosis)    Anemia in other chronic diseases classified elsewhere    Subacute osteomyelitis of left foot    Amputation of left great toe    Amputation of right great toe    Neuropathy    Foot pain, left    DKA  - , HCO3 9.3, AG 28.7, pH 7.35 but with pCO2 of 15 on admission  - AG has closed and acidosis improved but will leave on insulin gtt until out of critical phase    Septic shock 2/2 L diabetic foot wound  Streptococcal bacteremia  - WBC 38.5 on admission  - CXR without infiltrate, u/a uninfectious  - BCx 2/2 with GPCs -- strep by PCR  - repeat BCx now  - wound culture with strep  - likely osteomyelitis but not a candidate for surgical intervention currently  - Vanc/Zosyn --> unasyn.    AHRF  - on BiPAP  - close to needing intubation  - Pulm following    ARF on CKD4/5  - Cr 5.03 on admission, baseline anywhere from 3.5-4  - on bicarb gtt  - Nephrology consulted    Metabolic encephalopathy  - related to all of the above    Elevated troponin  - hsTrop 444 --> 442  - likely  related to all of the above  - check echo    Anemia  - Bahai and also does not want transfusion due to covid vaccine   - getting iron transfusion and epo  - d/c sub-q heparin  - ctm daily       Transferred to comfort measures 6/12. Using prns sparingly but looks visibly uncomfortable. Start scheduled morphine. Hospice referral, think if he does not qualify for inpatient hospice now he will soon.     Nutrition - NPO Diet NPO Type: Strict NPO   DVT Prophylaxis - scds  Code Status - full   GI ppx - pepcid  Disposition - tbd       Kye Tavarez MD  Hospitalist Group  6/13/2024  12:30 EDT

## 2024-06-13 NOTE — PLAN OF CARE
Goal Outcome Evaluation:      Pt aox4 this shift. Complaints of pain and discomfort due to diabetic foot ulcer on left heel. Medicated per the MAR for tachypnea. Pt responds well to morphine and versed given together. Family at bedside entire shift with plans to stay over night. Palliative care seen pt today. Hospice meeting is scheduled for tonight. Wound care performed. Family refusing most turns this shift, family educated on the importance of turns to help promote skin integrity. Pt dodge care completed. No complaints at this time. Call light within reach.

## 2024-06-13 NOTE — SIGNIFICANT NOTE
06/13/24 0559   OTHER   Discipline occupational therapist   Rehab Time/Intention   Session Not Performed   (will dc OT services as patient has transitioned to comfort measures)

## 2024-06-13 NOTE — PROGRESS NOTES
Follow-up for anemia and DKA    Transition to comfort care and moved out of the ICU  Resting in bed comfortably  No obvious work of breathing  Appears comfortable current medications  Denies pain at this time  No fevers      Vitals:    06/12/24 2000 06/12/24 2058 06/12/24 2100 06/12/24 2306   BP: 116/69   117/71   BP Location:    Left arm   Patient Position:    Lying   Pulse: 72 73 73 85   Resp:    24   Temp: 97.9 °F (36.6 °C)   97.7 °F (36.5 °C)   TempSrc:    Oral   SpO2: 96% (!) 88% 90% (!) 79%   Weight:       Height:         Ill-appearing male  Resting in bed comfortably.  Appears comfortable  Does have a very malodorous extremity  Respiratory rate controlled with narcotics  Is somnolent but easily arousable answer questions appropriately when Precedex is off        Lab 06/12/24  1149 06/12/24  1014 06/12/24  0354 06/12/24  0008 06/11/24  2038 06/11/24  1734 06/11/24  1517 06/11/24  1232   WBC  --   --  23.80*  --   --   --  35.06* 38.57*   HEMOGLOBIN  --   --  5.6*  --   --   --  7.1* 7.8*   HEMATOCRIT  --   --  16.8*  --   --   --  22.6* 24.7*   PLATELETS  --   --  369  --   --   --  505* 534*   SODIUM 135* 133* 135* 135* 138 135*  135*  --  130*   POTASSIUM 4.1 3.7 3.6 3.5 3.8 3.9  3.9  --  3.7   CHLORIDE 98 97* 102 103 105 103  103  --  92*   CO2 22.9 22.7 20.1* 18.6* 13.1* 10.1*  10.1*  --  9.3*   BUN 62* 62* 65* 66* 64* 64*  64*  --  65*   CREATININE 4.02* 4.16* 4.17* 4.28* 4.42* 4.63*  4.63*  --  5.03*   GLUCOSE 158* 146* 170* 222* 199* 194*  194*  --  222*   CALCIUM 7.4* 7.4* 7.6* 7.6* 7.9* 7.9*  7.9*  --  9.3   PHOSPHORUS 3.0 3.1 2.7 3.2 4.3  --  5.1* 4.4   TOTAL PROTEIN  --   --   --   --   --  5.8*  --  7.1   ALBUMIN  --   --   --   --   --  2.7*  --  3.1*   GLOBULIN  --   --   --   --   --  3.1  --  4.0         Assessment  Sepsis  Gram-positive bacteremia secondary to Streptococcus  Renal failure chronic kidney disease  Acidosis  DKA  Anemia  Diabetic foot wound  Respiratory failure-acute  hypoxic respiratory failure requiring continuous BiPAP likely secondary to pulmonary edema    Plan  Patient now comfort measures  Continue medications for pain, secretion management, anxiety, air hunger, delirium  Oxygen for comfort  Rest per primary       Labs, imaging, microbiology, notes and medications personally reviewed  Discussed with primary    Electronically signed by Rogelio Grimes MD, 06/13/24, 11:02 AM EDT.

## 2024-06-13 NOTE — CONSULTS
Hospice consult was placed with the hope of a GIP bed admission.  Caren TITUS RN, BSN  Palliative CareCaren TITUS RN, BSN  Palliative Care

## 2024-06-13 NOTE — NURSING NOTE
Met with Rogelio in his room on 4 NT.  He is alert to self place and situation. He is unaware of the date and time.  We spoke of the lack of ACP document. He tells me he wants his father, Chaitanya Dee to be his HCS.  He is unable to sign his full name but able to make a celsa on the LW paper, designating his father.   He is comfort measures only. He is receiving prn meds for pain, discomfort and shortness of breath. He now has scheduled morphine for pain and shortness of air.  Hospice consult placed for EOS. However family tells me it will be very difficult to find a caregiver for him.  Palliative care will continue to follow and support as needed.  Caren TITUS RN, BSN  Palliative Care

## 2024-06-13 NOTE — PLAN OF CARE
Goal Outcome Evaluation:         Patient transferred from CCU this shift as comfort measures. Family educated on signs and symptoms of pain. Patient stating he is in no pain and doesn't need pain medication. No needs or issues noted at this time.

## 2024-06-14 PROBLEM — I96 GANGRENE: Status: ACTIVE | Noted: 2024-01-01

## 2024-06-14 NOTE — PROGRESS NOTES
[facility]   Hospitalist Progress Note  Date: 2024  Patient Name: Rogelio Dee  : 1970  MRN: [id number]  Date of admission: 2024  Consultants:   -Pulmonology: Dr. Feliz Alexandra, Dr. Shubham Grimes  -Nephrology: Dr. Caden Rivero  -Podiatry: Dr. Chai Farias    Subjective   Subjective     Chief Complaint: Altered mental status    Summary:   Rogelio Dee is a 54 y.o. male with type 2 diabetes mellitus, CKD stage IV, essential hypertension, hyperlipidemia, chronic diastolic heart failure that presented to the ED due to altered mental status.  CT of the head was normal.  Patient found to be febrile, labs showing leukocytosis and worsening renal failure and profound acidosis.  Treatment for DKA initiated.  Broad-spectrum antibiotics started.  Patient started on bicarb drip and BiPAP.  Patient also with significant anemia.  Nephrology, pulmonology and podiatry consulted.  Patient's hemoglobin was noted to be 5.6, transfusion recommended but patient is a Islam and therefore refused any transfusions.  Patient found to be bacteremic with Streptococcus dysgalactiae ssp equisimilis.  Surgical management was recommended but due to patient's anemia surgery could not be pursued unless patient was agreeable to blood transfusions.  Patient stated that due to his Anabaptist believes he refused any blood transfusions and after further discussions decision was made to discontinue invasive care measures and pursue comfort care measures only.    Interval Followup:   No acute events overnight.  Patient has been receiving as needed Ativan and Versed and morphine for pain/symptom management.  Patient's father present at bedside.    Objective   Objective     Vitals:   Temp:  [99.5 °F (37.5 °C)] 99.5 °F (37.5 °C)  Heart Rate:  [115] 115  Resp:  [22] 22  BP: (148)/(121) 148/121  Flow (L/min):  [2] 2  Physical Exam   Gen: No apparent distress, sitting up in bed, father bedside, resting  Resp: CTAB,  normal respiratory effort  Card: Regular rhythm, tachycardic, No m/r/g  Abd: Soft, Nontender, Nondistended, + bowel sounds    Result Review    Result Review:  I have personally reviewed the results as below and agree with these findings:  []  Laboratory:   CMP          5/10/2024    17:24 6/11/2024    12:32 6/11/2024    17:34 6/11/2024    20:38 6/12/2024    00:08 6/12/2024    03:54 6/12/2024    10:14 6/12/2024    11:49   CMP   Glucose 297  222  194     194  199  222  170  146  158    BUN 63  65  64     64  64  66  65  62  62    Creatinine 3.87  5.03  4.63     4.63  4.42  4.28  4.17  4.16  4.02    EGFR 17.7  13.0  14.3     14.3  15.1  15.7  16.2  16.3  16.9    Sodium 135  130  135     135  138  135  135  133  135    Potassium 4.5  3.7  3.9     3.9  3.8  3.5  3.6  3.7  4.1    Chloride 103  92  103     103  105  103  102  97  98    Calcium 8.8  9.3  7.9     7.9  7.9  7.6  7.6  7.4  7.4    Total Protein 6.7  7.1  5.8         Albumin 3.8  3.1  2.7         Globulin 2.9  4.0  3.1         Total Bilirubin <0.2  0.4  0.2         Alkaline Phosphatase 127  186  139         AST (SGOT) 12  28  28         ALT (SGPT) 12  8  8         Albumin/Globulin Ratio 1.3  0.8  0.9         BUN/Creatinine Ratio 16.3  12.9  13.8     13.8  14.5  15.4  15.6  14.9  15.4    Anion Gap 14.8  28.7  21.9     21.9  19.9  13.4  12.9  13.3  14.1      CBC          5/10/2024    17:24 6/11/2024    12:32 6/11/2024    15:17 6/12/2024    03:54   CBC   WBC 8.99  38.57  35.06  23.80    RBC 3.10  2.67  2.37  1.89    Hemoglobin 9.3  7.8  7.1  5.6    Hematocrit 29.5  24.7  22.6  16.8    MCV 95.2  92.5  95.4  88.9    MCH 30.0  29.2  30.0  29.6    MCHC 31.5  31.6  31.4  33.3    RDW 11.9  13.2  13.3  13.2    Platelets 337  534  505  369      []  Microbiology:   []  Radiology:   []  EKG/Telemetry:    []  Cardiology/Vascular:    []  Pathology:  []  Old records:  []  Other:    Assessment & Plan   Assessment / Plan     Assessment:  Streptococcus dysgalactiae ssp equisimilis  bacteremia  Left diabetic foot wound  Septic shock secondary to left diabetic foot wound and streptococcal bacteremia  DKA  Acute on chronic diastolic heart failure  Metabolic acidosis  CKD stage IV  History of essential hypertension  Anemia    Plan:  -Continue as needed Ativan/Versed/morphine for symptom management  -Family to meet with hospice services  -Palliative care following  -Clinical course will dictate further management     Diet:   Diet Order   Procedures    Diet: Regular/House; Fluid Consistency: Thin (IDDSI 0)     Dispo: Comfort care     Personally reviewed patients labs and imaging, discussed with patient and nurse at bedside.      Part of this note may be an electronic transcription/translation of spoken language to printed text using the Dragon dictation system.    VTE Prophylaxis:  Mechanical VTE prophylaxis orders are present.        CODE STATUS:   Level Of Support Discussed With: Next of Kin (If No Surrogate)  Code Status (Patient has no pulse and is not breathing): No CPR (Do Not Attempt to Resuscitate)  Medical Interventions (Patient has pulse or is breathing): Comfort Measures        Electronically signed by Emery Fuentes MD, 6/14/2024, 17:11 EDT.

## 2024-06-14 NOTE — NURSING NOTE
Collaborated with primary RN. Patient remains on 4NT and on comfort focus care.   Informed patients mother cancelled the hospice EOS last evening due to be being overwhelmed. She was hoping to take her son home for EOL care however, when he choked on food she was unable to move him or provide immediate care and this terrified her.  They have agreed and rescheduled for this afternoon.  Patients father was at bedside. He tells me thtt his son seems comfortable at this time.   Mr Dee is resting quietly at present time.He has tachypnea even after being medicated. He now has scheduled medications. His sp02 has dropped and his temp and b/p have been elevated. He is minimally responsive as well.   Palliative care will continue to follow and support.   Caren TITUS RN, BSN  Palliative Care

## 2024-06-14 NOTE — PLAN OF CARE
Goal Outcome Evaluation:      Pt AOx1 throughout the shift, oriented to self, speech is garbbled and incoherent at times; remains on 2LNC per comfort measures. Pt turned Q2, skin and wound care provided per orders, as family allowed. Medicated with scheduled pain meds, no PRN given this shift, no complaints of pain. No acute changes noted this shift. Pt appears to be in no apparent distress at this current time, denies any current needs, and has call light within reach, family to remain at bedside

## 2024-06-14 NOTE — PLAN OF CARE
Goal Outcome Evaluation:         Pt responds only to pain this shift. Pt medicated PRN for signs and symptoms of distress. Pt turned as long as family would allow. Family refuses wound care to be performed at this time due to family members being at bedside visiting throughout shift. Pt resting in bed. Nolasco care completed. Pt seemingly comfortable at this time. Family educated.

## 2024-06-15 NOTE — PLAN OF CARE
Goal Outcome Evaluation:       Problem: Adult Inpatient Plan of Care  Goal: Patient-Specific Goal (Individualized)  Outcome: Unable to Meet, Plan Revised  Flowsheets (Taken 6/15/2024 4360)  Patient-Specific Goals (Include Timeframe): pt is not alert, only responds to pain. medicated for comfort per mar. hospice nurse came in today and advised pt be medicated q4 hours to maintain comfort. no other concerns noted.

## 2024-06-15 NOTE — PROGRESS NOTES
[facility]   Hospitalist Progress Note  Date: 6/15/2024  Patient Name: Rogelio Dee  : 1970  MRN: [id number]  Date of admission: 2024  Consultants:   -Pulmonology: Dr. Feliz Alexandra, Dr. Shubham Grimes  -Nephrology: Dr. Caden Rivero  -Podiatry: Dr. Chai Farias    Subjective   Subjective     Chief Complaint: Altered mental status    Summary:   Rogelio Dee is a 54 y.o. male with type 2 diabetes mellitus, CKD stage IV, essential hypertension, hyperlipidemia, chronic diastolic heart failure that presented to the ED due to altered mental status.  CT of the head was normal.  Patient found to be febrile, labs showing leukocytosis and worsening renal failure and profound acidosis.  Treatment for DKA initiated.  Broad-spectrum antibiotics started.  Patient started on bicarb drip and BiPAP.  Patient also with significant anemia.  Nephrology, pulmonology and podiatry consulted.  Patient's hemoglobin was noted to be 5.6, transfusion recommended but patient is a Synagogue and therefore refused any transfusions.  Patient found to be bacteremic with Streptococcus dysgalactiae ssp equisimilis.  Surgical management was recommended but due to patient's anemia surgery could not be pursued unless patient was agreeable to blood transfusions.  Patient stated that due to his Moravian believes he refused any blood transfusions and after further discussions decision was made to discontinue invasive care measures and pursue comfort care measures only.    Interval Followup:   No acute issues overnight.  Patient has been receiving as needed Ativan, Versed and morphine for symptom management.  Patient does seem to be a little uncomfortable on exam today.    Objective   Objective     Vitals:   Temp:  [100.4 °F (38 °C)-101.4 °F (38.6 °C)] 100.4 °F (38 °C)  Heart Rate:  [102-121] 102  Resp:  [20-22] 20  BP: (147-149)/(67-73) 147/67  Physical Exam   Gen: Appears a little uncomfortable, sitting up in bed,  family at bedside  Resp: CTAB, equal chest rise bilaterally  Card: Regular rhythm, tachycardic, No m/r/g  Abd: Soft, Nontender, Nondistended, + bowel sounds    Result Review    Result Review:  I have personally reviewed the results as below and agree with these findings:  []  Laboratory:   CMP          5/10/2024    17:24 6/11/2024    12:32 6/11/2024    17:34 6/11/2024    20:38 6/12/2024    00:08 6/12/2024    03:54 6/12/2024    10:14 6/12/2024    11:49   CMP   Glucose 297  222  194     194  199  222  170  146  158    BUN 63  65  64     64  64  66  65  62  62    Creatinine 3.87  5.03  4.63     4.63  4.42  4.28  4.17  4.16  4.02    EGFR 17.7  13.0  14.3     14.3  15.1  15.7  16.2  16.3  16.9    Sodium 135  130  135     135  138  135  135  133  135    Potassium 4.5  3.7  3.9     3.9  3.8  3.5  3.6  3.7  4.1    Chloride 103  92  103     103  105  103  102  97  98    Calcium 8.8  9.3  7.9     7.9  7.9  7.6  7.6  7.4  7.4    Total Protein 6.7  7.1  5.8         Albumin 3.8  3.1  2.7         Globulin 2.9  4.0  3.1         Total Bilirubin <0.2  0.4  0.2         Alkaline Phosphatase 127  186  139         AST (SGOT) 12  28  28         ALT (SGPT) 12  8  8         Albumin/Globulin Ratio 1.3  0.8  0.9         BUN/Creatinine Ratio 16.3  12.9  13.8     13.8  14.5  15.4  15.6  14.9  15.4    Anion Gap 14.8  28.7  21.9     21.9  19.9  13.4  12.9  13.3  14.1      CBC          5/10/2024    17:24 6/11/2024    12:32 6/11/2024    15:17 6/12/2024    03:54   CBC   WBC 8.99  38.57  35.06  23.80    RBC 3.10  2.67  2.37  1.89    Hemoglobin 9.3  7.8  7.1  5.6    Hematocrit 29.5  24.7  22.6  16.8    MCV 95.2  92.5  95.4  88.9    MCH 30.0  29.2  30.0  29.6    MCHC 31.5  31.6  31.4  33.3    RDW 11.9  13.2  13.3  13.2    Platelets 337  534  505  369      []  Microbiology:   []  Radiology:   []  EKG/Telemetry:    []  Cardiology/Vascular:    []  Pathology:  []  Old records:  []  Other:    Assessment & Plan   Assessment / Plan      Assessment:  Streptococcus dysgalactiae ssp equisimilis bacteremia  Left diabetic foot wound  Septic shock secondary to left diabetic foot wound and streptococcal bacteremia  DKA  Acute on chronic diastolic heart failure  Metabolic acidosis  CKD stage IV  History of essential hypertension  Anemia    Plan:  -Increased dose of as needed Ativan and morphine for symptom management  -Palliative care following  -Clinical course will dictate further management     Diet:   Diet Order   Procedures    Diet: Regular/House; Fluid Consistency: Thin (IDDSI 0)     Dispo: Comfort care     Personally reviewed patients labs and imaging, discussed with patient and nurse at bedside.      Part of this note may be an electronic transcription/translation of spoken language to printed text using the Dragon dictation system.    VTE Prophylaxis:  Mechanical VTE prophylaxis orders are present.        CODE STATUS:   Level Of Support Discussed With: Next of Kin (If No Surrogate)  Code Status (Patient has no pulse and is not breathing): No CPR (Do Not Attempt to Resuscitate)  Medical Interventions (Patient has pulse or is breathing): Comfort Measures        Electronically signed by Emery Fuentes MD, 6/15/2024, 12:16 EDT.

## 2024-06-15 NOTE — NURSING NOTE
RN provided EOS tonight for family at patient bedside. They desire for patient to be admitted to [facility] as a scatter bed. I obtained eligibility from [facility] as well as an order from Dr. Preciado stating he would put an order in to admit patient to hospice. Dr. Preciado also stated he would communicate with morning hospitalist about the need to discharge/readmit patient to scatter bed. Comfort and education provided to family. Patient is comfortable at this time and is without nonverbal indications of pain, dyspnea, and/or anxiety. Death appears imminent. Our [facility] team will follow up tomorrow. Please call us with any questions or concerns. Thank you for allowing us to help care for this patient.

## 2024-06-16 NOTE — PROGRESS NOTES
[facility]   Hospitalist Progress Note  Date: 2024  Patient Name: Rogelio Dee  : 1970  MRN: [id number]  Date of admission: 6/15/2024  Consultants:   -Pulmonology: Dr. Feliz Alexandra, Dr. Shubham Grimes  -Nephrology: Dr. Caden Rivero  -Podiatry: Dr. Chai Farias    Subjective   Subjective     Chief Complaint: Altered mental status    Summary:   Rogelio Dee is a 54 y.o. male with type 2 diabetes mellitus, CKD stage IV, essential hypertension, hyperlipidemia, chronic diastolic heart failure that presented to the ED due to altered mental status.  CT of the head was normal.  Patient found to be febrile, labs showing leukocytosis and worsening renal failure and profound acidosis.  Treatment for DKA initiated.  Broad-spectrum antibiotics started.  Patient started on bicarb drip and BiPAP.  Patient also with significant anemia.  Nephrology, pulmonology and podiatry consulted.  Patient's hemoglobin was noted to be 5.6, transfusion recommended but patient is a Gnosticism and therefore refused any transfusions.  Patient found to be bacteremic with Streptococcus dysgalactiae ssp equisimilis.  Surgical management was recommended but due to patient's anemia surgery could not be pursued unless patient was agreeable to blood transfusions.  Patient stated that due to his Judaism believes he refused any blood transfusions and after further discussions decision was made to discontinue invasive care measures and pursue comfort care measures only.    Interval Followup:   Patient with fevers overnight.  Tmax: 103.1 °F over the last 24 hours (7558-6746).  Patient receiving as needed morphine and atropine.    Objective   Objective     Vitals:   Temp:  [103 °F (39.4 °C)-103.1 °F (39.5 °C)] 103 °F (39.4 °C)  Heart Rate:  [113-120] 113  Resp:  [30-32] 32  BP: (173)/(68) 173/68  Physical Exam   Gen: Acutely ill-appearing male, sitting up in bed, somnolent, family at bedside  Resp: CTAB, normal respiratory  effort  Card: Regular rhythm, tachycardic, No m/r/g  Abd: Soft, Nontender, Nondistended, + bowel sounds    Result Review    Result Review:  I have personally reviewed the results as below and agree with these findings:  []  Laboratory:   CMP          5/10/2024    17:24 6/11/2024    12:32 6/11/2024    17:34 6/11/2024    20:38 6/12/2024    00:08 6/12/2024    03:54 6/12/2024    10:14 6/12/2024    11:49   CMP   Glucose 297  222  194     194  199  222  170  146  158    BUN 63  65  64     64  64  66  65  62  62    Creatinine 3.87  5.03  4.63     4.63  4.42  4.28  4.17  4.16  4.02    EGFR 17.7  13.0  14.3     14.3  15.1  15.7  16.2  16.3  16.9    Sodium 135  130  135     135  138  135  135  133  135    Potassium 4.5  3.7  3.9     3.9  3.8  3.5  3.6  3.7  4.1    Chloride 103  92  103     103  105  103  102  97  98    Calcium 8.8  9.3  7.9     7.9  7.9  7.6  7.6  7.4  7.4    Total Protein 6.7  7.1  5.8         Albumin 3.8  3.1  2.7         Globulin 2.9  4.0  3.1         Total Bilirubin <0.2  0.4  0.2         Alkaline Phosphatase 127  186  139         AST (SGOT) 12  28  28         ALT (SGPT) 12  8  8         Albumin/Globulin Ratio 1.3  0.8  0.9         BUN/Creatinine Ratio 16.3  12.9  13.8     13.8  14.5  15.4  15.6  14.9  15.4    Anion Gap 14.8  28.7  21.9     21.9  19.9  13.4  12.9  13.3  14.1      CBC          5/10/2024    17:24 6/11/2024    12:32 6/11/2024    15:17 6/12/2024    03:54   CBC   WBC 8.99  38.57  35.06  23.80    RBC 3.10  2.67  2.37  1.89    Hemoglobin 9.3  7.8  7.1  5.6    Hematocrit 29.5  24.7  22.6  16.8    MCV 95.2  92.5  95.4  88.9    MCH 30.0  29.2  30.0  29.6    MCHC 31.5  31.6  31.4  33.3    RDW 11.9  13.2  13.3  13.2    Platelets 337  534  505  369      []  Microbiology:   []  Radiology:   []  EKG/Telemetry:    []  Cardiology/Vascular:    []  Pathology:  []  Old records:  []  Other:    Assessment & Plan   Assessment / Plan     Assessment:  Streptococcus dysgalactiae ssp equisimilis bacteremia  Left  diabetic foot wound  Septic shock secondary to left diabetic foot wound and streptococcal bacteremia  DKA  Acute on chronic diastolic heart failure  Metabolic acidosis  CKD stage IV  History of essential hypertension  Anemia    Plan:  -IV Tylenol available for fever  -Continue as needed Ativan and morphine for symptom management  -Palliative care following  -Clinical course will dictate further management     Diet:   Diet Order   Procedures    Diet: Regular/House; Fluid Consistency: Thin (IDDSI 0)     Dispo: Comfort care     Personally reviewed patients labs and imaging, discussed with patient and nurse at bedside.      Part of this note may be an electronic transcription/translation of spoken language to printed text using the Dragon dictation system.    VTE Prophylaxis:  Mechanical VTE prophylaxis orders are present.        CODE STATUS:   Level Of Support Discussed With: Next of Kin (If No Surrogate)  Code Status (Patient has no pulse and is not breathing): No CPR (Do Not Attempt to Resuscitate)  Medical Interventions (Patient has pulse or is breathing): Comfort Measures        Electronically signed by Emery Fuentes MD, 6/16/2024, 13:37 EDT.

## 2024-06-16 NOTE — NURSING NOTE
This nurse entered the room at 1429 to administer comfort care medications and found the patient not breathing, checked for a pulse and there was none, called an RN to pronounce/verify death, Ronald MASSEY RN. The patient's father, Chaitanya , was at bedside on the phone with his wife. This nurse notified Jett Benítez Mgr, April, Carol Plasencia and the father left to  his wife. Charge nurse Marlene AHUJA RN notified [facility]SON.

## 2024-06-16 NOTE — DISCHARGE SUMMARY
[facility]         HOSPITALIST  DEATH SUMMARY    Patient Name: Rogelio Dee  : 1970  MRN: [id number]    Date of Admission: 2024  Time of Death:  2024 at 14:29  Primary Care Physician: Simeon Lomeli MD    Consultants:  -Pulmonology: Dr. Feliz Alexandra, Dr. Shubham Grimes  -Nephrology: Dr. Caden Rivero  -Podiatry: Dr. Paula Dinora    [facility] Problems:  Streptococcus dysgalactiae ssp equisimilis bacteremia  Left diabetic foot wound  Septic shock secondary to left diabetic foot wound and streptococcal bacteremia  DKA  Acute on chronic diastolic heart failure  Metabolic acidosis  CKD stage IV  History of essential hypertension  Anemia    Hospital Course     Hospital Course:  Rogelio Dee is a 54 y.o. male with type 2 diabetes mellitus, CKD stage IV, essential hypertension, hyperlipidemia, chronic diastolic heart failure that presented to the ED due to altered mental status. CT of the head was normal. Patient found to be febrile, labs showing leukocytosis and worsening renal failure and profound acidosis. Treatment for DKA initiated. Broad-spectrum antibiotics started. Patient started on bicarb drip and BiPAP. Patient also with significant anemia. Nephrology, pulmonology and podiatry consulted. Patient's hemoglobin was noted to be 5.6, transfusion recommended but patient is a [place] and therefore refused any transfusions. Patient found to be bacteremic with Streptococcus dysgalactiae ssp equisimilis. Surgical management was recommended but due to patient's anemia surgery could not be pursued unless patient was agreeable to blood transfusions. Patient stated that due to his Catholic believes he refused any blood transfusions and after further discussions decision was made to discontinue invasive care measures and pursue comfort care measures only.  Time of death: 2024 at 14:29.    Pertinent  and/or Most Recent Results     RADIOLOGY:  Duplex Lower  Extremity Art / Grafts - Left CAR [[id number]] Juan as Reviewed   Order Status: Completed Resulted: 06/11/24 2035    Updated: 06/11/24 2039    CFA Prox PSV-Left 106.25 cm/s     CFA Prox EDV-Left 23.99 cm/s     CFA Distal PSV-Left 167.94 cm/s     CFA Distal EDV-Left 25.13 cm/s     DFA Prox PSV-Left 110.82 cm/s     DFA Prox EDV-Left 17.14 cm/s     SFA Prox PSV-Left 115.39 cm/s     SFA Prox EDV-Left 14.85 cm/s     SFA Mid PSV-Left 58.11 cm/s     SFA Mid EDV-Left 8.37 cm/s     SFA Distal PSV-Left 55.69 cm/s     SFA Distal EDV-Left 5.24 cm/s     Popiteal A Prox PSV-Left 48.70 cm/s     Popiteal A Prox EDV-Left 4.76 cm/s     Popiteal A Mid PSV-Left 55.01 cm/s     Popiteal A Mid EDV-Left 6.35 cm/s     Popiteal A Distal PSV-Left 63.52 cm/s     Popiteal A Distal EDV-Left 8.37 cm/s     Ant Tibial A Prox PSV-Left 36.65 cm/s     Ant Tibial A Prox EDV-Left 4.05 cm/s     Ant Tibial A Distal PSV-Left 57.50 cm/s     Ant Tibial A Distal EDV-Left 6.09 cm/s     Lt. Tibperoneal PSV 81.40 cm/s     Lt. Tibeoperoneal EDV 5.24 cm/s     PTA Prox PSV-Left 29.08 cm/s     PTA Prox EDV-Left 5.37 cm/s     PTA Distal PSV-Left 29.24 cm/s     PTA Distal EDV-Left 5.21 cm/s     Peroneal Prox PSV-Left 47.90 cm/s     Peroneal Prox EDV-Left 4.17 cm/s     Peroneal Distal PSV-Left 28.08 cm/s     Peroneal Distal EDV-Left 0.48 cm/s     Right Brachial Pressure 109 mmHg     Left Brachial Pressure 109 mmHg     RIGHT PTA PRESSURE 100 mmHg     LEFT PTA PRESSURE 106 mmHg     RIGHT TED RATIO 0.92    LEFT TED RATIO 0.97   Narrative:       Normal ankle-brachial index bilaterally    Patent left common femoral, profunda femoris, and superficial femoral  arteries without significant stenosis.  There is mild to moderate common  femoral artery velocity elevations but not of hemodynamic significance.    Moderate diffuse atherosclerotic plaque is present throughout.    Patent left popliteal and tibioperoneal trunk without significant  stenosis.  Moderately heavy  atherosclerotic plaque is present.    Patent left tibial runoff arteries without significant stenosis.    Duplex Venous Lower Extremity - Bilateral CV-READ [[id number]] Juan as Reviewed   Order Status: Completed Resulted: 06/11/24 2021    Updated: 06/11/24 2025    Right Common Femoral Spont Y    Right Common Femoral Competent Y    Right Common Femoral Phasic N    Right Common Femoral Compress C    Right Common Femoral Augment Y    Right Proximal Femoral Compress C    Right Mid Femoral Spont Y    Right Mid Femoral Competent Y    Right Mid Femoral Phasic N    Right Mid Femoral Compress C    Right Mid Femoral Augment Y    Right Distal Femoral Compress C    Right Popliteal Spont Y    Right Popliteal Competent Y    Right Popliteal Phasic N    Right Popliteal Compress C    Right Popliteal Augment Y    Right Posterior Tibial Spont N    Right Posterior Tibial Competent Y    Right Posterior Tibial Compress C    Right Posterior Tibial Phasic N    Right Posterior Tibial Augment Y    Right Peroneal Spont N    Right Peroneal Competent Y    Right Peroneal Compress C    Right Peroneal Phasic N    Right Peroneal Augment Y    Right Gastronemius Compress C    Right Greater Saph AK Spont Y    Right Greater Saph AK Competent Y    Right Greater Saph AK Compress C    Right Greater Saph AK Phasic N    Right Greater Saph AK Augment Y    Right Greater Saph BK Compress C    Right Lesser Saph Compress C    Left Common Femoral Spont Y    Left Common Femoral Competent Y    Left Common Femoral Phasic N    Left Common Femoral Compress C    Left Common Femoral Augment Y    Left Proximal Femoral Compress C    Left Mid Femoral Spont Y    Left Mid Femoral Competent Y    Left Mid Femoral Phasic Y    Left Mid Femoral Compress C    Left Mid Femoral Augment Y    Left Distal Femoral Compress C    Left Popliteal Spont Y    Left Popliteal Competent Y    Left Popliteal Phasic Y    Left Popliteal Compress C    Left Popliteal Augment Y    Left Posterior Tibial  Spont D    Left Posterior Tibial Competent Y    Left Posterior Tibial Compress C    Left Posterior Tibial Phasic Y    Left Posterior Tibial Augment Y    Left Peroneal Spont D    Left Peroneal Competent Y    Left Peroneal Compress C    Left Peroneal Phasic Y    Left Peroneal Augment Y    Left Gastronemius Compress C    Left Greater Saph AK Compress C    Left Greater Saph BK Compress C    Left Lesser Saph Compress C   Narrative:       Normal bilateral lower extremity venous duplex scan.    XR Chest 1 View [[id number]] Juan as Reviewed   Order Status: Completed Collected: 06/11/24 1756    Updated: 06/11/24 1759   Narrative:     XR CHEST 1 VW    Date of Exam: 6/11/2024 5:44 PM EDT    Indication: post hd cath    Comparison: 6/11/2024 1255 hours    Findings:  New right IJ central line with the tip in the mid SVC. No pneumothorax. Stable enlargement of the cardiac silhouette. Questionable mild pulmonary vascular congestion. No evidence of large pleural effusion. There are no focal areas of airspace  consolidation.   Impression:     Impression:  New right IJ central line in good position. No pneumothorax.      Electronically Signed: Tim Ramirez MD   6/11/2024 5:57 PM EDT   Workstation ID: [id number]    CT Head Without Contrast [[id number]] Jaun as Reviewed   Order Status: Completed Collected: 06/11/24 1418    Updated: 06/11/24 1423   Narrative:     CT HEAD WO CONTRAST    Date of Exam: 6/11/2024 1:47 PM EDT    Indication: Altered mental status  headache.    Comparison: CT brain dated 9/12/2019    Technique: Axial CT images were obtained of the head without contrast administration.  Reconstructed coronal and sagittal images were also obtained. Automated exposure control and iterative construction methods were used.      Findings: There is motion artifact limiting the exam  There is no evidence of hemorrhage. There is no mass effect or midline shift mild diffuse brain atrophy.    There is no extracerebral collection. No change  in tiny round hyperdensity at the level of the roof of the third ventricle could represent a tiny colloid cyst or calcification    Ventricles are normal in size and configuration for patient's stated age.    Posterior fossa is within normal limits.    Calvarium and skull base appear intact.  Visualized sinuses show no air fluid levels. Visualized orbits are unremarkable.   Impression:     Impression:    No evidence of acute intracranial abnormality        Electronically Signed: Leo Figueredo MD   6/11/2024 2:21 PM EDT   Workstation ID: [id number]    XR Foot 2 View Left [[id number]] Juan as Reviewed   Order Status: Completed Collected: 06/11/24 1416    Updated: 06/11/24 1426   Narrative:     XR FOOT 2 VW LEFT    Date of Exam: 6/11/2024 1:45 PM EDT    Indication: Cellulitis distal foot pain    Comparison: None available.    Findings:  There is a soft tissue ulcer along the plantar aspect of the medial left foot. 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 There is no osseous erosion at the distal aspect of the metatarsal amputation site. There are chronic appearing  changes at the distal second metatarsal and proximal phalanx of the second digit with callus formation. This likely relates to old trauma or infection. There is no new osseous erosion.   Impression:     Impression:  1. Soft tissue ulceration along the plantar aspect of the medial left foot. No osseous erosion to suggest radiographic osteomyelitis.  2. Chronic appearing changes at the distal second metatarsal and proximal phalanx of the second digit likely related to old trauma or old infection.      Electronically Signed: Elias Boland   6/11/2024 2:24 PM EDT   Workstation ID: [id number]    XR Chest 1 View [[id number]] Juan as Reviewed   Order Status: Completed Collected: 06/11/24 1322    Updated: 06/11/24 1338   Narrative:     XR CHEST 1 VW    Date of Exam: 6/11/2024 1:04 PM EDT    Indication: Weak/Dizzy/AMS triage protocol    Comparison:  May 16, 2022    Findings:  The heart is enlarged. There is no nubia consolidation or obvious pleural effusions.   Impression:     Impression:  1.Cardiomegaly suggested.      Electronically Signed: Memo Werner MD   6/11/2024 1:32 PM EDT   Workstation ID: [id number]       LAB RESULTS:      Lab 06/12/24  0354 06/11/24  1750 06/11/24  1734 06/11/24  1517 06/11/24  1242 06/11/24  1232   WBC 23.80*  --   --  35.06*  --  38.57*   HEMOGLOBIN 5.6*  --   --  7.1*  --  7.8*   HEMATOCRIT 16.8*  --   --  22.6*  --  24.7*   PLATELETS 369  --   --  505*  --  534*   NEUTROS ABS 21.84*  --   --  30.85*  --  35.87*   IMMATURE GRANS (ABS) 0.50*  --   --  1.34*  --   --    LYMPHS ABS 0.61*  --   --  0.83  --   --    MONOS ABS 0.61  --   --  1.89*  --   --    EOS ABS 0.14  --   --  0.00  --   --    MCV 88.9  --   --  95.4  --  92.5   LACTATE  --  1.3 1.4  --  1.5 2.3*         Lab 06/12/24  1149 06/12/24  1014 06/12/24  0354 06/12/24  0008 06/11/24  2038 06/11/24  1517 06/11/24  1232   SODIUM 135* 133* 135* 135* 138   < > 130*   POTASSIUM 4.1 3.7 3.6 3.5 3.8   < > 3.7   CHLORIDE 98 97* 102 103 105   < > 92*   CO2 22.9 22.7 20.1* 18.6* 13.1*   < > 9.3*   ANION GAP 14.1 13.3 12.9 13.4 19.9*   < > 28.7*   BUN 62* 62* 65* 66* 64*   < > 65*   CREATININE 4.02* 4.16* 4.17* 4.28* 4.42*   < > 5.03*   EGFR 16.9* 16.3* 16.2* 15.7* 15.1*   < > 13.0*   GLUCOSE 158* 146* 170* 222* 199*   < > 222*   CALCIUM 7.4* 7.4* 7.6* 7.6* 7.9*   < > 9.3   MAGNESIUM 1.9 1.6 1.6 1.7 1.4*   < > 1.7   PHOSPHORUS 3.0 3.1 2.7 3.2 4.3   < > 4.4   HEMOGLOBIN A1C  --   --   --   --   --   --  6.10*    < > = values in this interval not displayed.         Lab 06/11/24  1734 06/11/24  1232   TOTAL PROTEIN 5.8* 7.1   ALBUMIN 2.7* 3.1*   GLOBULIN 3.1 4.0   ALT (SGPT) 8 8   AST (SGOT) 28 28   BILIRUBIN 0.2 0.4   ALK PHOS 139* 186*         Lab 06/11/24  1734 06/11/24  1232   HSTROP T 442* 444*             Lab 06/11/24  1232   IRON 13*   IRON SATURATION (TSAT) 8*   TIBC 161*    TRANSFERRIN 108*   FERRITIN 727.80*   FOLATE 9.92   VITAMIN B 12 1,019*         Lab 06/12/24  0609 06/11/24  1750 06/11/24  1242   PH, ARTERIAL 7.507*  --  7.350   PCO2, ARTERIAL 24.1*  --  14.9*   PO2 ART 56.4*  --  95.8   O2 SATURATION ART 92.3*  --  97.4   FIO2 30 40  --    HCO3 ART 19.2*  --  8.2*   BASE EXCESS ART -3.8*  --  -15.6*     Brief Urine Lab Results  (Last result in the past 365 days)        Color   Clarity   Blood   Leuk Est   Nitrite   Protein   CREAT   Urine HCG        06/11/24 1232 Yellow   Clear   Negative   Negative   Negative   >=300 mg/dL (3+)                 Microbiology Results (last 10 days)       Procedure Component Value - Date/Time    Wound Culture - Wound, Foot, Left [[id number]]  (Abnormal) Collected: 06/12/24 1049    Lab Status: Final result Specimen: Wound from Foot, Left Updated: 06/14/24 0833     Wound Culture Light growth (2+) Streptococcus, Beta Hemolytic, Group G     Comment:   This organism is considered to be universally susceptible to penicillin.  No further antibiotic testing will be performed. If Clindamycin or Erythromycin is the drug of choice, notify the laboratory within 7 days to request susceptibility testing.         Rare growth Normal Skin Darling     Gram Stain No organisms seen    Blood Culture - Blood, Arm, Right [[id number]]  (Abnormal)  (Susceptibility) Collected: 06/11/24 1232    Lab Status: Final result Specimen: Blood from Arm, Right Updated: 06/14/24 0645     Blood Culture Streptococcus dysgalactiae ssp equisimilis     Isolated from Aerobic and Anaerobic Bottles     Gram Stain Aerobic Bottle Gram positive cocci in chains      Anaerobic Bottle Gram positive cocci in chains    Susceptibility        Streptococcus dysgalactiae ssp equisimilis      MARY      Ceftriaxone Susceptible      Penicillin G Susceptible      Vancomycin Susceptible                           Blood Culture ID, PCR - Blood, Arm, Right [[id number]]  (Abnormal) Collected: 06/11/24 1232    Lab  Status: Final result Specimen: Blood from Arm, Right Updated: 06/12/24 0035     BCID, PCR Streptococcus spp, not A, B, or pneumoniae. Identification by BCID2 PCR.     BOTTLE TYPE Aerobic Bottle    Blood Culture - Blood, Arm, Left [[id number]]  (Abnormal) Collected: 06/11/24 1230    Lab Status: Final result Specimen: Blood from Arm, Left Updated: 06/14/24 0645     Blood Culture Streptococcus dysgalactiae ssp equisimilis     Isolated from Aerobic and Anaerobic Bottles     Gram Stain Anaerobic Bottle Gram positive cocci in chains      Aerobic Bottle Gram positive cocci in chains    Narrative:      Refer to previous blood culture collected on 06/11/2024 1232 for MICs.              Results for orders placed during the hospital encounter of 06/11/24    Duplex Lower Extremity Art / Grafts - Left CAR    Interpretation Summary    Normal ankle-brachial index bilaterally    Patent left common femoral, profunda femoris, and superficial femoral arteries without significant stenosis.  There is mild to moderate common femoral artery velocity elevations but not of hemodynamic significance.  Moderate diffuse atherosclerotic plaque is present throughout.    Patent left popliteal and tibioperoneal trunk without significant stenosis.  Moderately heavy atherosclerotic plaque is present.    Patent left tibial runoff arteries without significant stenosis.      Results for orders placed during the hospital encounter of 06/11/24    Duplex Lower Extremity Art / Grafts - Left CAR    Interpretation Summary    Normal ankle-brachial index bilaterally    Patent left common femoral, profunda femoris, and superficial femoral arteries without significant stenosis.  There is mild to moderate common femoral artery velocity elevations but not of hemodynamic significance.  Moderate diffuse atherosclerotic plaque is present throughout.    Patent left popliteal and tibioperoneal trunk without significant stenosis.  Moderately heavy atherosclerotic plaque  is present.    Patent left tibial runoff arteries without significant stenosis.            Electronically signed by Emery Fuentes MD, 06/16/24, 4:26 PM EDT.

## 2024-06-19 LAB — GLUCOSE BLDC GLUCOMTR-MCNC: 227 MG/DL (ref 70–99)

## 2024-08-06 NOTE — TELEPHONE ENCOUNTER
Called to remind of speciality appointmnt tomorrow for ALONDRA Santos for DM management. He did not answer, detailed message for him of date and time of f/u tomorrow.   
1.46